# Patient Record
Sex: FEMALE | Race: WHITE | Employment: FULL TIME | URBAN - METROPOLITAN AREA
[De-identification: names, ages, dates, MRNs, and addresses within clinical notes are randomized per-mention and may not be internally consistent; named-entity substitution may affect disease eponyms.]

---

## 2023-07-13 ENCOUNTER — OFFICE VISIT (OUTPATIENT)
Dept: FAMILY MEDICINE CLINIC | Age: 38
End: 2023-07-13
Payer: COMMERCIAL

## 2023-07-13 ENCOUNTER — HOSPITAL ENCOUNTER (OUTPATIENT)
Age: 38
Discharge: HOME | End: 2023-07-13
Payer: COMMERCIAL

## 2023-07-13 VITALS
DIASTOLIC BLOOD PRESSURE: 70 MMHG | OXYGEN SATURATION: 98 % | BODY MASS INDEX: 27.23 KG/M2 | TEMPERATURE: 98 F | HEART RATE: 74 BPM | WEIGHT: 148 LBS | HEIGHT: 62 IN | SYSTOLIC BLOOD PRESSURE: 120 MMHG

## 2023-07-13 DIAGNOSIS — Z13.1 ENCOUNTER FOR SCREENING EXAMINATION FOR IMPAIRED GLUCOSE REGULATION AND DIABETES MELLITUS: ICD-10-CM

## 2023-07-13 DIAGNOSIS — F19.91 HISTORY OF DRUG USE: ICD-10-CM

## 2023-07-13 DIAGNOSIS — F17.210 CIGARETTE NICOTINE DEPENDENCE WITHOUT COMPLICATION: ICD-10-CM

## 2023-07-13 DIAGNOSIS — B96.89 BACTERIAL VAGINOSIS: ICD-10-CM

## 2023-07-13 DIAGNOSIS — I73.00 RAYNAUD'S DISEASE WITHOUT GANGRENE: Primary | ICD-10-CM

## 2023-07-13 DIAGNOSIS — Z13.0 SCREENING FOR DEFICIENCY ANEMIA: ICD-10-CM

## 2023-07-13 DIAGNOSIS — I73.00 RAYNAUD'S DISEASE WITHOUT GANGRENE: ICD-10-CM

## 2023-07-13 DIAGNOSIS — B18.2 CHRONIC HEPATITIS C WITHOUT HEPATIC COMA (HCC): ICD-10-CM

## 2023-07-13 DIAGNOSIS — N76.0 BACTERIAL VAGINOSIS: ICD-10-CM

## 2023-07-13 LAB
ALBUMIN SERPL-MCNC: 4 G/DL (ref 3.5–5.2)
ALP SERPL-CCNC: 72 U/L (ref 35–104)
ALT SERPL-CCNC: 54 U/L (ref 5–33)
ANION GAP SERPL CALCULATED.3IONS-SCNC: 11 MMOL/L (ref 9–17)
AST SERPL-CCNC: 47 U/L
BASOPHILS # BLD: 0 K/UL (ref 0–0.2)
BASOPHILS NFR BLD: 0 % (ref 0–2)
BILIRUB SERPL-MCNC: 0.5 MG/DL (ref 0.3–1.2)
BUN SERPL-MCNC: 7 MG/DL (ref 6–20)
BUN/CREAT SERPL: 12 (ref 9–20)
CALCIUM SERPL-MCNC: 9.4 MG/DL (ref 8.6–10.4)
CHLORIDE SERPL-SCNC: 101 MMOL/L (ref 98–107)
CO2 SERPL-SCNC: 24 MMOL/L (ref 20–31)
CREAT SERPL-MCNC: 0.6 MG/DL (ref 0.5–0.9)
DIFFERENTIAL TYPE: YES
EOSINOPHIL # BLD: 0.1 K/UL (ref 0–0.4)
EOSINOPHILS RELATIVE PERCENT: 2 % (ref 0–5)
ERYTHROCYTE [DISTWIDTH] IN BLOOD BY AUTOMATED COUNT: 13.1 % (ref 12.1–15.2)
GFR SERPL CREATININE-BSD FRML MDRD: >60 ML/MIN/1.73M2
GLUCOSE SERPL-MCNC: 88 MG/DL (ref 70–99)
HCT VFR BLD AUTO: 49.2 % (ref 36–46)
HGB BLD-MCNC: 16.6 G/DL (ref 12–16)
LYMPHOCYTES # BLD: 27 % (ref 15–40)
LYMPHOCYTES NFR BLD: 2.2 K/UL (ref 1–4.8)
MCH RBC QN AUTO: 35.3 PG (ref 26–34)
MCHC RBC AUTO-ENTMCNC: 33.7 G/DL (ref 31–37)
MCV RBC AUTO: 104.6 FL (ref 80–100)
MONOCYTES NFR BLD: 0.5 K/UL (ref 0–1)
MONOCYTES NFR BLD: 6 % (ref 4–8)
NEUTROPHILS NFR BLD: 65 % (ref 47–75)
NEUTS SEG NFR BLD: 5.4 K/UL (ref 2.5–7)
PLATELET # BLD AUTO: 232 K/UL (ref 140–450)
POTASSIUM SERPL-SCNC: 3.7 MMOL/L (ref 3.7–5.3)
PROT SERPL-MCNC: 7.4 G/DL (ref 6.4–8.3)
RBC # BLD AUTO: 4.71 M/UL (ref 4–5.2)
SODIUM SERPL-SCNC: 136 MMOL/L (ref 135–144)
TSH SERPL DL<=0.05 MIU/L-ACNC: 0.81 UIU/ML (ref 0.3–5)
WBC OTHER # BLD: 8.2 K/UL (ref 3.5–11)

## 2023-07-13 PROCEDURE — 36415 COLL VENOUS BLD VENIPUNCTURE: CPT

## 2023-07-13 PROCEDURE — 84443 ASSAY THYROID STIM HORMONE: CPT

## 2023-07-13 PROCEDURE — 85027 COMPLETE CBC AUTOMATED: CPT

## 2023-07-13 PROCEDURE — 87902 NFCT AGT GNTYP ALYS HEP C: CPT

## 2023-07-13 PROCEDURE — 99203 OFFICE O/P NEW LOW 30 MIN: CPT | Performed by: NURSE PRACTITIONER

## 2023-07-13 PROCEDURE — 80053 COMPREHEN METABOLIC PANEL: CPT

## 2023-07-13 RX ORDER — VARENICLINE TARTRATE
KIT
Qty: 53 EACH | Refills: 0 | Status: SHIPPED | OUTPATIENT
Start: 2023-07-13

## 2023-07-13 RX ORDER — METRONIDAZOLE 500 MG/1
500 TABLET ORAL EVERY 12 HOURS
COMMUNITY
Start: 2023-07-11

## 2023-07-13 SDOH — ECONOMIC STABILITY: FOOD INSECURITY: WITHIN THE PAST 12 MONTHS, THE FOOD YOU BOUGHT JUST DIDN'T LAST AND YOU DIDN'T HAVE MONEY TO GET MORE.: NEVER TRUE

## 2023-07-13 SDOH — ECONOMIC STABILITY: FOOD INSECURITY: WITHIN THE PAST 12 MONTHS, YOU WORRIED THAT YOUR FOOD WOULD RUN OUT BEFORE YOU GOT MONEY TO BUY MORE.: NEVER TRUE

## 2023-07-13 SDOH — ECONOMIC STABILITY: INCOME INSECURITY: HOW HARD IS IT FOR YOU TO PAY FOR THE VERY BASICS LIKE FOOD, HOUSING, MEDICAL CARE, AND HEATING?: NOT HARD AT ALL

## 2023-07-13 SDOH — ECONOMIC STABILITY: HOUSING INSECURITY
IN THE LAST 12 MONTHS, WAS THERE A TIME WHEN YOU DID NOT HAVE A STEADY PLACE TO SLEEP OR SLEPT IN A SHELTER (INCLUDING NOW)?: NO

## 2023-07-13 ASSESSMENT — ENCOUNTER SYMPTOMS
DIARRHEA: 0
ABDOMINAL PAIN: 0
SORE THROAT: 0
CONSTIPATION: 0
BLOOD IN STOOL: 0
BACK PAIN: 0
VOMITING: 0
COUGH: 0
SHORTNESS OF BREATH: 0
NAUSEA: 0

## 2023-07-13 ASSESSMENT — PATIENT HEALTH QUESTIONNAIRE - PHQ9
SUM OF ALL RESPONSES TO PHQ QUESTIONS 1-9: 0
1. LITTLE INTEREST OR PLEASURE IN DOING THINGS: 0
2. FEELING DOWN, DEPRESSED OR HOPELESS: 0
SUM OF ALL RESPONSES TO PHQ QUESTIONS 1-9: 0
SUM OF ALL RESPONSES TO PHQ QUESTIONS 1-9: 0
SUM OF ALL RESPONSES TO PHQ9 QUESTIONS 1 & 2: 0
SUM OF ALL RESPONSES TO PHQ QUESTIONS 1-9: 0

## 2023-07-13 NOTE — PATIENT INSTRUCTIONS
SURVEY:    You may be receiving a survey from [employer] regarding your visit today. Please complete the survey to enable us to provide the highest quality of care to you and your family. If you cannot score us a very good (5 Stars) on any question, please call the office to discuss how we could have made your experience a very good one. Thank you.     Clinical Care Team: CHUCK Morales-SELVIN Vigil LPN    Clerical Team: 1 [address] N[address]o

## 2023-07-13 NOTE — PROGRESS NOTES
HPI Notes    Name: Amos Hinson  : 1985         Chief Complaint:     Chief Complaint   Patient presents with    Hepatitis C     Patient dx with hepatitis C in 2014. She is past drug addict , she has not used since . Establish Care       History of Present Illness:        HPI  Pt is a 43 yo female who presents as a new pt. Pt smokes cigarettes and vapes. Smoking about 1/2ppd. Wants to quit and would like to try Chantix. Started cigarettes at age 13. Added marijuana at 12. Age 21 started doing \"pills, whatever I could get\". Changed to heroin at 21, snorting. First did IV heroin around age 30. Got sober in  after being incarcerated. Has successfully stayed clean for the past 10 years. Dx with Hep C in . Has never done any treatment. Tore both wrists at [employer]. Wore wrist braces and did PT. Has Raynaud's disease. Does not treat them. Works at [employer]. Going through a divorce. Has 4 children. Oldest is 22. She also struggled with addiction. Past Medical History:     Past Medical History:   Diagnosis Date    Anxiety     Drug addict ([facility])     Hepatitis C       Reviewed all health maintenance requirements and ordered appropriate tests  Health Maintenance Due   Topic Date Due    Varicella vaccine (1 of 2 - 2-dose childhood series) Never done    Pneumococcal 0-64 years Vaccine (1 - PCV) Never done    HIV screen  Never done    Hepatitis C screen  Never done    DTaP/Tdap/Td vaccine (1 - Tdap) Never done    Cervical cancer screen  Never done    Diabetes screen  Never done    COVID-19 Vaccine (4 - Booster for [place] series) 2022       Past Surgical History:     Past Surgical History:   Procedure Laterality Date    TUBAL LIGATION          Medications:       Prior to Admission medications    Medication Sig Start Date End Date Taking? Authorizing Provider   metroNIDAZOLE (FLAGYL) 500 MG tablet Take 1 tablet by mouth in the morning and 1 tablet in the evening.  for 7

## 2023-07-17 LAB — HCV GENTYP SERPL NAA+PROBE: NORMAL

## 2023-08-03 ENCOUNTER — OFFICE VISIT (OUTPATIENT)
Dept: GASTROENTEROLOGY | Age: 38
End: 2023-08-03
Payer: COMMERCIAL

## 2023-08-03 VITALS
WEIGHT: 149 LBS | HEART RATE: 73 BPM | OXYGEN SATURATION: 99 % | RESPIRATION RATE: 19 BRPM | BODY MASS INDEX: 27.25 KG/M2

## 2023-08-03 DIAGNOSIS — R76.8 HEPATITIS C ANTIBODY POSITIVE IN BLOOD: ICD-10-CM

## 2023-08-03 DIAGNOSIS — R74.8 ELEVATED LIVER ENZYMES: Primary | ICD-10-CM

## 2023-08-03 PROCEDURE — 99202 OFFICE O/P NEW SF 15 MIN: CPT | Performed by: INTERNAL MEDICINE

## 2023-08-03 NOTE — PATIENT INSTRUCTIONS
SURVEY:    You may be receiving a survey from [facility] regarding your visit today. Please complete the survey to enable us to provide the highest quality of care to you and your family. If you cannot score us a very good on any question, please call the office to discuss how we could have made your experience a very good one. Thank you.   Tomas MENDEZ/MD Herlinda Simpson MD Lurline Hale, MD Harold Cove, DO [facility], APRN-CN  Shaka Vanegas, APRN-CNP  [address], LINDA Romano, [address] [facility], [address] [place] [address], [address] [place] [address], [address]

## 2023-08-12 ENCOUNTER — HOSPITAL ENCOUNTER (OUTPATIENT)
Dept: ULTRASOUND IMAGING | Age: 38
Discharge: HOME | End: 2023-08-12
Payer: COMMERCIAL

## 2023-08-12 DIAGNOSIS — R74.8 ELEVATED LIVER ENZYMES: ICD-10-CM

## 2023-08-12 DIAGNOSIS — R76.8 HEPATITIS C ANTIBODY POSITIVE IN BLOOD: ICD-10-CM

## 2023-08-12 PROCEDURE — 76705 ECHO EXAM OF ABDOMEN: CPT

## 2023-08-12 PROCEDURE — 76981 USE PARENCHYMA: CPT

## 2023-08-17 ENCOUNTER — TELEPHONE (OUTPATIENT)
Dept: FAMILY MEDICINE CLINIC | Age: 38
End: 2023-08-17

## 2023-08-17 NOTE — TELEPHONE ENCOUNTER
Pt called wanting an appointment, no appointments to fit pt's needs.  Pt stated she has finished her ABX for a VD and still is having symptoms, pt stated she will go to a walk in she didn't want to wait

## 2023-08-21 ENCOUNTER — TELEPHONE (OUTPATIENT)
Dept: GASTROENTEROLOGY | Age: 38
End: 2023-08-21

## 2023-08-21 NOTE — TELEPHONE ENCOUNTER
----- Message from Juanito Hyde MD sent at 8/19/2023 10:12 PM EDT -----  Please notify patient: There is no evidence of cirrhosis per ultrasound report.

## 2023-08-31 ENCOUNTER — HOSPITAL ENCOUNTER (OUTPATIENT)
Age: 38
Discharge: HOME | End: 2023-08-31
Payer: COMMERCIAL

## 2023-08-31 ENCOUNTER — OFFICE VISIT (OUTPATIENT)
Dept: FAMILY MEDICINE CLINIC | Age: 38
End: 2023-08-31
Payer: COMMERCIAL

## 2023-08-31 VITALS
DIASTOLIC BLOOD PRESSURE: 80 MMHG | HEART RATE: 75 BPM | BODY MASS INDEX: 26.7 KG/M2 | SYSTOLIC BLOOD PRESSURE: 120 MMHG | WEIGHT: 146 LBS | OXYGEN SATURATION: 99 %

## 2023-08-31 DIAGNOSIS — R35.0 URINARY FREQUENCY: Primary | ICD-10-CM

## 2023-08-31 DIAGNOSIS — R76.8 HEPATITIS C ANTIBODY POSITIVE IN BLOOD: ICD-10-CM

## 2023-08-31 DIAGNOSIS — N30.00 ACUTE CYSTITIS WITHOUT HEMATURIA: ICD-10-CM

## 2023-08-31 DIAGNOSIS — R30.0 DYSURIA: ICD-10-CM

## 2023-08-31 DIAGNOSIS — F17.210 CIGARETTE NICOTINE DEPENDENCE WITHOUT COMPLICATION: ICD-10-CM

## 2023-08-31 DIAGNOSIS — R35.0 URINARY FREQUENCY: ICD-10-CM

## 2023-08-31 DIAGNOSIS — T88.7XXA SIDE EFFECT OF DRUG: ICD-10-CM

## 2023-08-31 DIAGNOSIS — R74.8 ELEVATED LIVER ENZYMES: ICD-10-CM

## 2023-08-31 LAB
A1AT SERPL-MCNC: 172 MG/DL (ref 90–200)
ALBUMIN SERPL-MCNC: 4 G/DL (ref 3.5–5.2)
ALP SERPL-CCNC: 81 U/L (ref 35–104)
ALT SERPL-CCNC: 45 U/L (ref 5–33)
ANION GAP SERPL CALCULATED.3IONS-SCNC: 9 MMOL/L (ref 9–17)
AST SERPL-CCNC: 52 U/L
BASOPHILS # BLD: ABNORMAL K/UL (ref 0–0.2)
BASOPHILS NFR BLD: ABNORMAL % (ref 0–2)
BILIRUB SERPL-MCNC: 0.4 MG/DL (ref 0.3–1.2)
BILIRUBIN, POC: NEGATIVE
BLOOD URINE, POC: NEGATIVE
BUN SERPL-MCNC: 4 MG/DL (ref 6–20)
BUN/CREAT SERPL: 7 (ref 9–20)
CALCIUM SERPL-MCNC: 9.4 MG/DL (ref 8.6–10.4)
CERULOPLASMIN SERPL-MCNC: 29 MG/DL (ref 16–45)
CHLORIDE SERPL-SCNC: 103 MMOL/L (ref 98–107)
CLARITY, POC: CLEAR
CO2 SERPL-SCNC: 28 MMOL/L (ref 20–31)
COLOR, POC: YELLOW
CREAT SERPL-MCNC: 0.6 MG/DL (ref 0.5–0.9)
EOSINOPHIL # BLD: 0.2 K/UL (ref 0–0.4)
EOSINOPHILS RELATIVE PERCENT: 3 % (ref 0–5)
ERYTHROCYTE [DISTWIDTH] IN BLOOD BY AUTOMATED COUNT: 13 % (ref 12.1–15.2)
GFR SERPL CREATININE-BSD FRML MDRD: >60 ML/MIN/1.73M2
GLUCOSE SERPL-MCNC: 100 MG/DL (ref 70–99)
GLUCOSE URINE, POC: NEGATIVE
HAV AB SERPL IA-ACNC: NONREACTIVE
HAV IGM SERPL QL IA: NONREACTIVE
HBV CORE AB SER QL: NONREACTIVE
HBV CORE IGM SERPL QL IA: NONREACTIVE
HBV SURFACE AB SERPL IA-ACNC: <3.5 MIU/ML
HBV SURFACE AG SERPL QL IA: NONREACTIVE
HCT VFR BLD AUTO: 49.2 % (ref 36–46)
HGB BLD-MCNC: 16.6 G/DL (ref 12–16)
IMM GRANULOCYTES # BLD AUTO: ABNORMAL K/UL (ref 0–0.3)
IMM GRANULOCYTES NFR BLD: ABNORMAL %
KETONES, POC: NORMAL
LEUKOCYTE EST, POC: NORMAL
LYMPHOCYTES NFR BLD: 1.84 K/UL (ref 1–4.8)
LYMPHOCYTES RELATIVE PERCENT: 27 % (ref 15–40)
MCH RBC QN AUTO: 35.1 PG (ref 26–34)
MCHC RBC AUTO-ENTMCNC: 33.8 G/DL (ref 31–37)
MCV RBC AUTO: 103.9 FL (ref 80–100)
MONOCYTES NFR BLD: 0.34 K/UL (ref 0–1)
MONOCYTES NFR BLD: 5 % (ref 4–8)
MORPHOLOGY: ABNORMAL
NEUTROPHILS NFR BLD: 65 % (ref 47–75)
NEUTS SEG NFR BLD: 4.42 K/UL (ref 2.5–7)
NITRITE, POC: POSITIVE
PH, POC: 6
PLATELET # BLD AUTO: 263 K/UL (ref 140–450)
POTASSIUM SERPL-SCNC: 3.7 MMOL/L (ref 3.7–5.3)
PROT SERPL-MCNC: 7.9 G/DL (ref 6.4–8.3)
PROTEIN, POC: NEGATIVE
RBC # BLD AUTO: 4.73 M/UL (ref 4–5.2)
SODIUM SERPL-SCNC: 140 MMOL/L (ref 135–144)
SPECIFIC GRAVITY, POC: 1.02
UROBILINOGEN, POC: 1
WBC OTHER # BLD: 6.8 K/UL (ref 3.5–11)

## 2023-08-31 PROCEDURE — 83516 IMMUNOASSAY NONANTIBODY: CPT

## 2023-08-31 PROCEDURE — 81003 URINALYSIS AUTO W/O SCOPE: CPT | Performed by: STUDENT IN AN ORGANIZED HEALTH CARE EDUCATION/TRAINING PROGRAM

## 2023-08-31 PROCEDURE — 86317 IMMUNOASSAY INFECTIOUS AGENT: CPT

## 2023-08-31 PROCEDURE — 81256 HFE GENE: CPT

## 2023-08-31 PROCEDURE — 80053 COMPREHEN METABOLIC PANEL: CPT

## 2023-08-31 PROCEDURE — 87086 URINE CULTURE/COLONY COUNT: CPT

## 2023-08-31 PROCEDURE — 85025 COMPLETE CBC W/AUTO DIFF WBC: CPT

## 2023-08-31 PROCEDURE — 99214 OFFICE O/P EST MOD 30 MIN: CPT | Performed by: STUDENT IN AN ORGANIZED HEALTH CARE EDUCATION/TRAINING PROGRAM

## 2023-08-31 PROCEDURE — 36415 COLL VENOUS BLD VENIPUNCTURE: CPT

## 2023-08-31 PROCEDURE — 87340 HEPATITIS B SURFACE AG IA: CPT

## 2023-08-31 PROCEDURE — 86708 HEPATITIS A ANTIBODY: CPT

## 2023-08-31 PROCEDURE — 86705 HEP B CORE ANTIBODY IGM: CPT

## 2023-08-31 PROCEDURE — 86709 HEPATITIS A IGM ANTIBODY: CPT

## 2023-08-31 PROCEDURE — 82103 ALPHA-1-ANTITRYPSIN TOTAL: CPT

## 2023-08-31 PROCEDURE — 82390 ASSAY OF CERULOPLASMIN: CPT

## 2023-08-31 PROCEDURE — 82784 ASSAY IGA/IGD/IGG/IGM EACH: CPT

## 2023-08-31 PROCEDURE — 87522 HEPATITIS C REVRS TRNSCRPJ: CPT

## 2023-08-31 PROCEDURE — 86704 HEP B CORE ANTIBODY TOTAL: CPT

## 2023-08-31 RX ORDER — CIPROFLOXACIN 500 MG/1
500 TABLET, FILM COATED ORAL 2 TIMES DAILY
Qty: 14 TABLET | Refills: 0 | Status: SHIPPED | OUTPATIENT
Start: 2023-08-31 | End: 2023-09-07

## 2023-08-31 RX ORDER — BUPROPION HYDROCHLORIDE 150 MG/1
150 TABLET ORAL EVERY MORNING
Qty: 30 TABLET | Refills: 3 | Status: SHIPPED | OUTPATIENT
Start: 2023-08-31

## 2023-08-31 ASSESSMENT — ENCOUNTER SYMPTOMS
ABDOMINAL PAIN: 1
DIARRHEA: 0
COUGH: 0
WHEEZING: 0
VOMITING: 0
BACK PAIN: 0
SINUS PAIN: 0
NAUSEA: 0
RHINORRHEA: 0

## 2023-08-31 NOTE — PROGRESS NOTES
HPI Notes    Name: Laura Parker  : 1985         Chief Complaint:     Chief Complaint   Patient presents with    Vaginitis     Follow up. Patient was treated twice in the past 2 months for BV. She is currently experiencing burning and itching. No discharge. The past 2 days she has complaint of urinary urgency, lower abdominal discomfort and low back pain. History of Present Illness:      HPI    Is a 70-year-old woman presenting for complaint of dysuria, vaginal itching, low back pain, urinary urgency, frequency, suprapubic discomfort. This is been going on for several weeks and she has been treated 2 times for bacterial vaginosis at outside facilities. She denies vaginal discharge, dyspareunia, vaginal bleeding. 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 She would like to consider Wellbutrin instead. Past Medical History:     Past Medical History:   Diagnosis Date    Anxiety     Drug addict ([address])     Hepatitis C     Liver disease       Reviewed all health maintenance requirements and ordered appropriate tests  Health Maintenance Due   Topic Date Due    Hepatitis A vaccine (1 of 2 - Risk 2-dose series) Never done    Varicella vaccine (1 of 2 - 2-dose childhood series) Never done    Pneumococcal 0-64 years Vaccine (1 - PCV) Never done    HIV screen  Never done    Hepatitis B vaccine (1 of 3 - Risk 3-dose series) Never done    DTaP/Tdap/Td vaccine (1 - Tdap) Never done    Cervical cancer screen  Never done    COVID-19 Vaccine (4 - Booster for [place] series) 2022    Flu vaccine (1) 2023       Past Surgical History:     Past Surgical History:   Procedure Laterality Date    TUBAL LIGATION          Medications:       Prior to Admission medications    Medication Sig Start Date End Date Taking?  Authorizing Provider   ciprofloxacin (CIPRO) 500 MG tablet Take 1

## 2023-08-31 NOTE — PATIENT INSTRUCTIONS
SURVEY:    You may be receiving a survey from [employer] regarding your visit today. You may get this in the mail, through your MyChart or in your email. Please complete the survey to enable us to provide the highest quality of care to you and your family. If you cannot score us as very good ( 5 Stars) on any question, please feel free to call the office to discuss how we could have made your experience exceptional.     Thank you.     Clinical Care Team:  Dr. Paul lOvera, DO Ana Ledezma LPN    Triage:  [facility], [facility] Team:  Antony Resendiz

## 2023-09-01 LAB
MICROORGANISM SPEC CULT: NO GROWTH
SPECIMEN DESCRIPTION: NORMAL

## 2023-09-02 LAB — SMOOTH MUSCLE ANTIBODY: 9 UNITS (ref 0–19)

## 2023-09-03 DIAGNOSIS — R76.8 HEPATITIS C ANTIBODY POSITIVE IN BLOOD: Primary | ICD-10-CM

## 2023-09-03 LAB
GLIADIN IGA SER IA-ACNC: 2 U/ML
GLIADIN IGG SER IA-ACNC: <0.4 U/ML
HCV RNA # SERPL NAA+PROBE: DETECTED {COPIES}/ML
HCV RNA SERPL NAA+PROBE-ACNC: ABNORMAL IU/ML
HCV RNA SERPL NAA+PROBE-LOG IU: 6.79 LOG IU/ML
IGA SERPL-MCNC: 376 MG/DL (ref 70–400)
MITOCHONDRIA M2 IGG SER-ACNC: <0.5 U/ML (ref 0–4)
SPECIMEN SOURCE: ABNORMAL
TTG IGA SER IA-ACNC: 0.6 U/ML

## 2023-09-06 LAB
C282Y HEMOCHROMATOSIS MUT: NEGATIVE
H63D HEMOCHROMATOSIS MUT: NEGATIVE
HEMOCHROMATOSIS MUTATION INT: NORMAL
HEMOCHROMATOSIS SPECIMEN: NORMAL
S65C HEMOCHROMATOSIS MUT: NEGATIVE

## 2023-09-07 ENCOUNTER — TELEPHONE (OUTPATIENT)
Dept: FAMILY MEDICINE CLINIC | Age: 38
End: 2023-09-07

## 2023-09-07 DIAGNOSIS — N30.00 ACUTE CYSTITIS WITHOUT HEMATURIA: Primary | ICD-10-CM

## 2023-09-07 RX ORDER — NITROFURANTOIN 25; 75 MG/1; MG/1
100 CAPSULE ORAL 2 TIMES DAILY
Qty: 20 CAPSULE | Refills: 0 | Status: SHIPPED | OUTPATIENT
Start: 2023-09-07 | End: 2023-09-17

## 2023-09-07 NOTE — TELEPHONE ENCOUNTER
----- Message from Pia Burch LPN sent at 3/6/8217  3:12 PM EDT -----  Philip Foremanlander patient.  Thanks   ----- Message -----  From: Ismael Jones MD  Sent: 9/6/2023   9:53 AM EDT  To: Eve Silveira Clinical Staff    Please notify patients of normal labs

## 2023-09-07 NOTE — TELEPHONE ENCOUNTER
Patient calling states that she was still waiting on a call about what she should be doing because of her symptoms - spoke to the nurse last week when she called with culture results

## 2023-09-21 ENCOUNTER — OFFICE VISIT (OUTPATIENT)
Dept: GASTROENTEROLOGY | Age: 38
End: 2023-09-21
Payer: COMMERCIAL

## 2023-09-21 VITALS
HEART RATE: 72 BPM | BODY MASS INDEX: 26.68 KG/M2 | HEIGHT: 62 IN | WEIGHT: 145 LBS | RESPIRATION RATE: 16 BRPM | SYSTOLIC BLOOD PRESSURE: 120 MMHG | OXYGEN SATURATION: 98 % | DIASTOLIC BLOOD PRESSURE: 82 MMHG

## 2023-09-21 DIAGNOSIS — R76.8 HEPATITIS C ANTIBODY POSITIVE IN BLOOD: Primary | ICD-10-CM

## 2023-09-21 PROCEDURE — 99213 OFFICE O/P EST LOW 20 MIN: CPT | Performed by: INTERNAL MEDICINE

## 2023-09-21 NOTE — PROGRESS NOTES
been treated since her diagnosis of hepatitis C. Her hepatitis C genotype is 1a, her viral load is 6.1 million. Plan:  Hepatitis C antibody positive in blood  -     External Referral To Infectious Disease; Dr. Rafa Israel.       - She prefers to stay local and within minutes rather than travel to [facility]. - US elastography shows no evidence of cirrhosis at this time     2. F/U in 3 months or sooner as needed    Spent 20 minutes with the patient with greater than 50 percent of the time was spent on face-to-face time in discussion with the patient regarding diagnostic options/results, treatment options, counseling, and follow-up plan.   Electronically signed by Lon Solorzano MD on 9/21/23 at 4:27 PM EDT

## 2023-10-26 ENCOUNTER — TELEPHONE (OUTPATIENT)
Dept: GASTROENTEROLOGY | Age: 38
End: 2023-10-26

## 2023-10-26 NOTE — TELEPHONE ENCOUNTER
Patient was referred to Infectious Disease, Dr Tu Be. I phoned the patient to remind her of this. The patient states that she will not be going to this referral, states that she has too much going on. She has let their office know as well.

## 2024-05-14 ENCOUNTER — OFFICE VISIT (OUTPATIENT)
Dept: FAMILY MEDICINE CLINIC | Age: 39
End: 2024-05-14
Payer: COMMERCIAL

## 2024-05-14 VITALS
HEIGHT: 62 IN | HEART RATE: 72 BPM | DIASTOLIC BLOOD PRESSURE: 60 MMHG | WEIGHT: 147 LBS | BODY MASS INDEX: 27.05 KG/M2 | SYSTOLIC BLOOD PRESSURE: 120 MMHG | OXYGEN SATURATION: 100 %

## 2024-05-14 DIAGNOSIS — S39.012A LOW BACK STRAIN, INITIAL ENCOUNTER: Primary | ICD-10-CM

## 2024-05-14 PROCEDURE — 99213 OFFICE O/P EST LOW 20 MIN: CPT | Performed by: NURSE PRACTITIONER

## 2024-05-14 RX ORDER — TIZANIDINE 4 MG/1
4 TABLET ORAL NIGHTLY PRN
Qty: 30 TABLET | Refills: 0 | Status: SHIPPED | OUTPATIENT
Start: 2024-05-14

## 2024-05-14 ASSESSMENT — ENCOUNTER SYMPTOMS
NAUSEA: 0
VOMITING: 0
BOWEL INCONTINENCE: 0
DIARRHEA: 0
SHORTNESS OF BREATH: 0

## 2024-05-14 ASSESSMENT — PATIENT HEALTH QUESTIONNAIRE - PHQ9
SUM OF ALL RESPONSES TO PHQ QUESTIONS 1-9: 0
SUM OF ALL RESPONSES TO PHQ9 QUESTIONS 1 & 2: 0
SUM OF ALL RESPONSES TO PHQ QUESTIONS 1-9: 0
2. FEELING DOWN, DEPRESSED OR HOPELESS: NOT AT ALL
1. LITTLE INTEREST OR PLEASURE IN DOING THINGS: NOT AT ALL

## 2024-05-14 NOTE — PATIENT INSTRUCTIONS
Patient Education        Low Back Pain: Exercises  Introduction  Here are some examples of exercises for you to try. The exercises may be suggested for a condition or for rehabilitation. Start each exercise slowly. Ease off the exercises if you start to have pain.  You will be told when to start these exercises and which ones will work best for you.  How to do the exercises  Hamstring stretch in a doorway    Sit on the floor close to a doorway. Be sure to stretch your affected leg first.  Lie down with your other leg through the doorway.  Slide your affected leg up the wall to straighten your knee. Don't point your toes. You should feel a gentle stretch down the back of your leg. Be sure to:  Hold the stretch for at least 1 minute. Then over time, try to lengthen the time you hold the stretch to as long as 6 minutes.  Repeat 2 to 4 times.  It's a good idea to repeat these steps with your other leg.  To stretch your right leg, scoot to the right side of the doorway.  To stretch your left leg, scoot to the left side of the doorway.  Keep both knees straight.  Keep your back flat and your other heel on the floor.  If you do not have a place to do this exercise in a doorway, there is another way to do it:  Lie on your back, and bend the knee of your affected leg.  Loop a towel under the ball and toes of that foot, and hold the ends of the towel in your hands.  Straighten your knee as you raise that foot into the air. Slowly pull back on the towel. You should feel a gentle stretch down the back of your leg.  Hold the stretch for 15 to 30 seconds. Or even better, hold the stretch for 1 minute if you can.  Repeat 2 to 4 times.  It's a good idea to repeat these steps with your other leg.  Single knee-to-chest stretch    Lie on your back with your knees bent and your feet flat on the floor. You can put a small pillow under your head and neck if it is more comfortable.  Clasp your hands under one knee and bring the knee to

## 2024-05-14 NOTE — PROGRESS NOTES
HPI Notes    Name: Nola Llanes  : 1985         Chief Complaint:     Chief Complaint   Patient presents with    Back Pain     Intermittent back pain ongoing for about a year. Pt states it normally is just a dull ache, states yesterday she went to cross her legs and a pain shot up her back and pain has been intense since. Intermittent spasm's.        History of Present Illness:        Back Pain  This is a new problem. The current episode started yesterday. The problem occurs intermittently. The problem has been gradually worsening since onset. The pain is present in the lumbar spine. The quality of the pain is described as aching and shooting. The pain is moderate. The symptoms are aggravated by sitting and lying down. Stiffness is present In the morning. Pertinent negatives include no bladder incontinence, bowel incontinence, chest pain, fever, headaches, paresthesias or perianal numbness. Risk factors include lack of exercise. She has tried NSAIDs (TENS) for the symptoms. The treatment provided mild relief.       Past Medical History:     Past Medical History:   Diagnosis Date    Anxiety     Drug addict (HCC)     Hepatitis C     Liver disease       Reviewed all health maintenance requirements and ordered appropriate tests  Health Maintenance Due   Topic Date Due    Hepatitis B vaccine (1 of 3 - 3-dose series) Never done    Varicella vaccine (1 of 2 - 2-dose childhood series) Never done    Pneumococcal 0-64 years Vaccine (1 of 2 - PCV) Never done    HIV screen  Never done    Hepatitis A vaccine (1 of 2 - Risk 2-dose series) Never done    DTaP/Tdap/Td vaccine (1 - Tdap) Never done    Cervical cancer screen  Never done    COVID-19 Vaccine (2023- season) 2023    Lipids  Never done       Past Surgical History:     Past Surgical History:   Procedure Laterality Date    TUBAL LIGATION          Medications:       Prior to Admission medications    Medication Sig Start Date End Date Taking?

## 2024-08-09 ENCOUNTER — HOSPITAL ENCOUNTER (OUTPATIENT)
Age: 39
Setting detail: SPECIMEN
Discharge: HOME | End: 2024-08-09
Payer: COMMERCIAL

## 2024-08-09 ENCOUNTER — OFFICE VISIT (OUTPATIENT)
Dept: FAMILY MEDICINE CLINIC | Age: 39
End: 2024-08-09
Payer: COMMERCIAL

## 2024-08-09 VITALS — OXYGEN SATURATION: 98 % | SYSTOLIC BLOOD PRESSURE: 120 MMHG | HEART RATE: 76 BPM | DIASTOLIC BLOOD PRESSURE: 78 MMHG

## 2024-08-09 DIAGNOSIS — Z12.31 ENCOUNTER FOR SCREENING MAMMOGRAM FOR MALIGNANT NEOPLASM OF BREAST: ICD-10-CM

## 2024-08-09 DIAGNOSIS — N30.00 ACUTE CYSTITIS WITHOUT HEMATURIA: ICD-10-CM

## 2024-08-09 DIAGNOSIS — N92.6 IRREGULAR MENSES: ICD-10-CM

## 2024-08-09 DIAGNOSIS — N30.00 ACUTE CYSTITIS WITHOUT HEMATURIA: Primary | ICD-10-CM

## 2024-08-09 DIAGNOSIS — S39.012D LOW BACK STRAIN, SUBSEQUENT ENCOUNTER: ICD-10-CM

## 2024-08-09 LAB
BILIRUBIN, POC: NORMAL
BLOOD URINE, POC: NORMAL
CANDIDA SPECIES: NEGATIVE
CLARITY, POC: CLEAR
COLOR, POC: YELLOW
GARDNERELLA VAGINALIS: POSITIVE
GLUCOSE URINE, POC: NORMAL
KETONES, POC: NORMAL
LEUKOCYTE EST, POC: NORMAL
NITRITE, POC: NORMAL
PH, POC: 5.5
PROTEIN, POC: NORMAL
SOURCE: ABNORMAL
SPECIFIC GRAVITY, POC: 1.01
TRICHOMONAS: POSITIVE
UROBILINOGEN, POC: 1

## 2024-08-09 PROCEDURE — 87086 URINE CULTURE/COLONY COUNT: CPT

## 2024-08-09 PROCEDURE — 87660 TRICHOMONAS VAGIN DIR PROBE: CPT

## 2024-08-09 PROCEDURE — 87510 GARDNER VAG DNA DIR PROBE: CPT

## 2024-08-09 PROCEDURE — 81002 URINALYSIS NONAUTO W/O SCOPE: CPT | Performed by: NURSE PRACTITIONER

## 2024-08-09 PROCEDURE — 99214 OFFICE O/P EST MOD 30 MIN: CPT | Performed by: NURSE PRACTITIONER

## 2024-08-09 PROCEDURE — 87480 CANDIDA DNA DIR PROBE: CPT

## 2024-08-09 RX ORDER — TIZANIDINE 4 MG/1
4 TABLET ORAL NIGHTLY PRN
Qty: 30 TABLET | Refills: 2 | Status: SHIPPED | OUTPATIENT
Start: 2024-08-09

## 2024-08-09 RX ORDER — CEPHALEXIN 500 MG/1
500 CAPSULE ORAL 3 TIMES DAILY
Qty: 15 CAPSULE | Refills: 0 | Status: SHIPPED | OUTPATIENT
Start: 2024-08-09 | End: 2024-08-14

## 2024-08-09 SDOH — ECONOMIC STABILITY: FOOD INSECURITY: WITHIN THE PAST 12 MONTHS, YOU WORRIED THAT YOUR FOOD WOULD RUN OUT BEFORE YOU GOT MONEY TO BUY MORE.: NEVER TRUE

## 2024-08-09 SDOH — ECONOMIC STABILITY: FOOD INSECURITY: WITHIN THE PAST 12 MONTHS, THE FOOD YOU BOUGHT JUST DIDN'T LAST AND YOU DIDN'T HAVE MONEY TO GET MORE.: NEVER TRUE

## 2024-08-09 SDOH — ECONOMIC STABILITY: INCOME INSECURITY: HOW HARD IS IT FOR YOU TO PAY FOR THE VERY BASICS LIKE FOOD, HOUSING, MEDICAL CARE, AND HEATING?: NOT HARD AT ALL

## 2024-08-09 ASSESSMENT — ENCOUNTER SYMPTOMS
SHORTNESS OF BREATH: 0
COUGH: 0
VOMITING: 0
ABDOMINAL PAIN: 1
NAUSEA: 0
DIARRHEA: 0
CRAMPS: 1
BACK PAIN: 1
BOWEL INCONTINENCE: 0

## 2024-08-09 NOTE — PROGRESS NOTES
never used smokeless tobacco.  Alcohol:      has no history on file for alcohol use.  Drug Use:  reports that she does not currently use drugs after having used the following drugs: Cocaine.    Family History:     Family History   Problem Relation Age of Onset    No Known Problems Mother     Alcohol Abuse Father     Diabetes Maternal Grandmother        Review of Systems:         Review of Systems   Constitutional:  Negative for chills and fever.   Respiratory:  Negative for cough and shortness of breath.    Cardiovascular:  Negative for chest pain and palpitations.   Gastrointestinal:  Positive for abdominal pain. Negative for bowel incontinence, diarrhea, nausea and vomiting.   Genitourinary:  Positive for vaginal bleeding. Negative for bladder incontinence and vaginal discharge.   Musculoskeletal:  Positive for back pain.   Neurological:  Negative for dizziness, seizures, headaches and paresthesias.         Physical Exam:     Vitals:  /78 (Site: Left Upper Arm, Position: Sitting)   Pulse 76   SpO2 98%       Physical Exam  Vitals and nursing note reviewed.   Constitutional:       Appearance: Normal appearance. She is well-developed.   Cardiovascular:      Rate and Rhythm: Normal rate and regular rhythm.      Heart sounds: Normal heart sounds, S1 normal and S2 normal.   Pulmonary:      Effort: Pulmonary effort is normal. No respiratory distress.      Breath sounds: Normal breath sounds.   Abdominal:      General: Bowel sounds are normal.      Palpations: Abdomen is soft.      Tenderness: There is abdominal tenderness in the suprapubic area.      Comments: NEG markle test   Musculoskeletal:      Lumbar back: Tenderness present. Decreased range of motion. Negative right straight leg raise test and negative left straight leg raise test.      Comments: tenderness to paraspinal muscles lumbar left > right  NO increased pain with forward bend  NO increased pain with backward bend  Mild increase in pain with side

## 2024-08-10 ENCOUNTER — TELEPHONE (OUTPATIENT)
Dept: FAMILY MEDICINE CLINIC | Age: 39
End: 2024-08-10

## 2024-08-10 LAB
MICROORGANISM SPEC CULT: NORMAL
SERVICE CMNT-IMP: NORMAL
SPECIMEN DESCRIPTION: NORMAL

## 2024-08-10 RX ORDER — METRONIDAZOLE 500 MG/1
500 TABLET ORAL 2 TIMES DAILY
Qty: 14 TABLET | Refills: 0 | Status: SHIPPED | OUTPATIENT
Start: 2024-08-10 | End: 2024-08-17

## 2024-08-10 NOTE — TELEPHONE ENCOUNTER
Pt called with results of Vnap. Pt educated about disease processes. Flagyl 500mg BID x 7 days sent to [facility]

## 2024-08-22 ENCOUNTER — TELEPHONE (OUTPATIENT)
Dept: FAMILY MEDICINE CLINIC | Age: 39
End: 2024-08-22

## 2024-08-22 DIAGNOSIS — A59.01 TRICHOMONAS VAGINALIS (TV) INFECTION: Primary | ICD-10-CM

## 2024-08-22 RX ORDER — METRONIDAZOLE 500 MG/1
500 TABLET ORAL 2 TIMES DAILY
Qty: 6 TABLET | Refills: 0 | Status: SHIPPED | OUTPATIENT
Start: 2024-08-22 | End: 2024-08-25

## 2024-08-22 NOTE — PROGRESS NOTES
NAAT for trich ordered, continue Flagyl for 3 days (10 total). Get test to cure performed next week any time.

## 2024-08-22 NOTE — TELEPHONE ENCOUNTER
Pt was seen on 08/09/24. Pt took an ABX  Flagyl for a STI and she is still havinf pain in the groin area. Does pt need another ABX? Please advise

## 2024-08-27 ENCOUNTER — TELEPHONE (OUTPATIENT)
Dept: FAMILY MEDICINE CLINIC | Age: 39
End: 2024-08-27

## 2024-08-27 NOTE — TELEPHONE ENCOUNTER
Pt was seen on 08/09/24  for hematuria. Pt tested positive for Trichomonas and Gardnerella. Pt has completed taking the ABX Flagyl. Pt called on 08/22/24 and medication was extended.   Yesterday pt stated having left groin pain and left side back pain. Please advise

## 2024-08-30 ENCOUNTER — HOSPITAL ENCOUNTER (OUTPATIENT)
Age: 39
Setting detail: SPECIMEN
Discharge: HOME | End: 2024-08-30
Payer: COMMERCIAL

## 2024-08-30 ENCOUNTER — OFFICE VISIT (OUTPATIENT)
Dept: FAMILY MEDICINE CLINIC | Age: 39
End: 2024-08-30
Payer: COMMERCIAL

## 2024-08-30 VITALS — SYSTOLIC BLOOD PRESSURE: 104 MMHG | DIASTOLIC BLOOD PRESSURE: 70 MMHG | OXYGEN SATURATION: 98 % | HEART RATE: 90 BPM

## 2024-08-30 DIAGNOSIS — B96.89 BACTERIAL VAGINOSIS: Primary | ICD-10-CM

## 2024-08-30 DIAGNOSIS — B96.89 BACTERIAL VAGINOSIS: ICD-10-CM

## 2024-08-30 DIAGNOSIS — N76.0 BACTERIAL VAGINOSIS: ICD-10-CM

## 2024-08-30 DIAGNOSIS — N76.0 BACTERIAL VAGINOSIS: Primary | ICD-10-CM

## 2024-08-30 PROCEDURE — 87480 CANDIDA DNA DIR PROBE: CPT

## 2024-08-30 PROCEDURE — 87660 TRICHOMONAS VAGIN DIR PROBE: CPT

## 2024-08-30 PROCEDURE — 99213 OFFICE O/P EST LOW 20 MIN: CPT | Performed by: NURSE PRACTITIONER

## 2024-08-30 PROCEDURE — 87510 GARDNER VAG DNA DIR PROBE: CPT

## 2024-08-30 ASSESSMENT — ENCOUNTER SYMPTOMS
COUGH: 0
VOMITING: 0
SHORTNESS OF BREATH: 0
DIARRHEA: 0
NAUSEA: 0

## 2024-08-30 NOTE — PROGRESS NOTES
[facility] Notes    Name: Nola Llanes  : 1985         Chief Complaint:     Chief Complaint   Patient presents with    Groin Pain     Left sided groin pain. Patient has been treating with Ibuprofen. Patient recently finished the Flagyl. Patient states it has improved since last visit but not fully gone.        History of Present Illness:        HPI  Patient is a 38-year-old female who reports for follow-up.  Patient was seen on 2024 for vaginal bleeding, abdominal cramping, and lower pelvic pain.  It was discovered that she was positive for trichomonas and Gardnerella.  Patient was treated with Flagyl twice daily x 10 days.  Patient states that her pain is much improved, however she is still having left lower pelvic pain.  Denies any vaginal bleeding or discharge at this time.  Past Medical History:     Past Medical History:   Diagnosis Date    Anxiety     Drug addict (HCC)     Hepatitis C     Liver disease       Reviewed all health maintenance requirements and ordered appropriate tests  Health Maintenance Due   Topic Date Due    Pneumococcal 0-64 years Vaccine (1 of 2 - PCV) Never done    Varicella vaccine (1 of 2 - 13+ 2-dose series) Never done    HIV screen  Never done    Hepatitis B vaccine (1 of 3 - 19+ 3-dose series) Never done    DTaP/Tdap/Td vaccine (1 - Tdap) Never done    Cervical cancer screen  Never done    COVID-19 Vaccine ( - - season) 2023    Lipids  Never done    Breast cancer screen  Never done    Flu vaccine (1) 2024       Past Surgical History:     Past Surgical History:   Procedure Laterality Date    TUBAL LIGATION          Medications:       Prior to Admission medications    Medication Sig Start Date End Date Taking? Authorizing Provider   Multiple Vitamin (MULTI VITAMIN PO) Take by mouth daily   Yes Provider, MD Raz   tiZANidine (ZANAFLEX) 4 MG tablet Take 1 tablet by mouth nightly as needed (muscle spasm) 24  Yes Gabino Esparaz DNP     08/31/2023 08:48 AM    CO2 28 08/31/2023 08:48 AM    BUN 4 08/31/2023 08:48 AM    CREATININE 0.6 08/31/2023 08:48 AM    GLUCOSE 100 08/31/2023 08:48 AM    BILITOT 0.4 08/31/2023 08:48 AM    ALKPHOS 81 08/31/2023 08:48 AM    AST 52 08/31/2023 08:48 AM    ALT 45 08/31/2023 08:48 AM     Lab Results   Component Value Date/Time    WBC 6.8 08/31/2023 08:48 AM    RBC 4.73 08/31/2023 08:48 AM    HGB 16.6 08/31/2023 08:48 AM    HCT 49.2 08/31/2023 08:48 AM    .9 08/31/2023 08:48 AM    MCH 35.1 08/31/2023 08:48 AM    MCHC 33.8 08/31/2023 08:48 AM    RDW 13.0 08/31/2023 08:48 AM     08/31/2023 08:48 AM     Lab Results   Component Value Date/Time    TSH 0.81 07/13/2023 05:16 PM     No results found for: \"CHOL\", \"LDL\", \"HDL\", \"PSA\", \"LABA1C\"       Assessment & Plan        Diagnosis Orders   1. Bacterial vaginosis  Vaginitis DNA Probe        Will repeat the nap.              Completed Refills   Requested Prescriptions      No prescriptions requested or ordered in this encounter     No follow-ups on file.  No orders of the defined types were placed in this encounter.    Orders Placed This Encounter   Procedures    Vaginitis DNA Probe     Standing Status:   Future     Standing Expiration Date:   8/30/2025         There are no Patient Instructions on file for this visit.    Electronically signed by Gabino Esparza DNP,APRN,CNP  on 8/30/2024 at 3:14 PM           Completed Refills   Requested Prescriptions      No prescriptions requested or ordered in this encounter

## 2024-08-31 LAB
CANDIDA SPECIES: NEGATIVE
GARDNERELLA VAGINALIS: POSITIVE
SOURCE: ABNORMAL
TRICHOMONAS: NEGATIVE

## 2024-09-04 ENCOUNTER — TELEPHONE (OUTPATIENT)
Dept: FAMILY MEDICINE CLINIC | Age: 39
End: 2024-09-04

## 2024-09-04 NOTE — TELEPHONE ENCOUNTER
Nola saw on her mychart that her test results are in but hasn't gotten a call. I did let her know Mark is off until Thursday. She would like a call with results.      Health Maintenance   Topic Date Due    Pneumococcal 0-64 years Vaccine (1 of 2 - PCV) Never done    Varicella vaccine (1 of 2 - 13+ 2-dose series) Never done    HIV screen  Never done    Hepatitis B vaccine (1 of 3 - 19+ 3-dose series) Never done    DTaP/Tdap/Td vaccine (1 - Tdap) Never done    Cervical cancer screen  Never done    Lipids  Never done    Breast cancer screen  Never done    Flu vaccine (1) 08/01/2024    COVID-19 Vaccine (4 - 2023-24 season) 09/01/2024    Depression Screen  05/14/2025    Hepatitis C screen  Completed    Hepatitis A vaccine  Aged Out    Hib vaccine  Aged Out    HPV vaccine  Aged Out    Polio vaccine  Aged Out    Meningococcal (ACWY) vaccine  Aged Out             (applicable per patient's age: Cancer Screenings, Depression Screening, Fall Risk Screening, Immunizations)    AST (U/L)   Date Value   08/31/2023 52 (H)     ALT (U/L)   Date Value   08/31/2023 45 (H)     BUN (mg/dL)   Date Value   08/31/2023 4 (L)      (goal A1C is < 7)   (goal LDL is <100) need 30-50% reduction from baseline     BP Readings from Last 3 Encounters:   08/30/24 104/70   08/09/24 120/78   05/14/24 120/60    (goal /80)      All Future Testing planned in [facility]:  Lab Frequency Next Occurrence   MEDARDO ISIAH DIGITAL SCREEN BILATERAL Once 08/09/2024   Trichomonas Vaginali, Molecular Once 08/22/2024       Next Visit Date:  No future appointments.         Patient Active Problem List:     Raynaud's disease without gangrene

## 2024-09-05 ENCOUNTER — TELEPHONE (OUTPATIENT)
Dept: FAMILY MEDICINE CLINIC | Age: 39
End: 2024-09-05

## 2024-09-05 RX ORDER — METRONIDAZOLE 500 MG/1
500 TABLET ORAL 2 TIMES DAILY
Qty: 14 TABLET | Refills: 0 | Status: SHIPPED | OUTPATIENT
Start: 2024-09-05 | End: 2024-09-12

## 2024-09-05 RX ORDER — METRONIDAZOLE 7.5 MG/G
GEL TOPICAL
Qty: 1 EACH | Refills: 0 | Status: SHIPPED | OUTPATIENT
Start: 2024-09-05

## 2024-09-05 NOTE — TELEPHONE ENCOUNTER
Patient phoned in and states she received a call from [employer]- they are unable to compound the vaginal suppositories. Any other recommendations?

## 2024-09-05 NOTE — TELEPHONE ENCOUNTER
We will need to call around and find a pharmacy that does have it.  This is the Intrachol part of the treatment plan

## 2024-09-05 NOTE — TELEPHONE ENCOUNTER
----- Message from Gabino Esparza DNP sent at 9/5/2024  8:53 AM EDT -----  Please let pt know that she is still positive for gardnerella, BV. There treatment for recurrent infection is as follows:    Metronidazole 500mg PO BID x 7 days then,  Boric acid gel caps 600mg intravaginally at HS x 21 days and then,  Metronidazole vaginal gel, 1 applicator, twice weekly for 16 weeks    Please pend these medications. Should abstain from sex during the entire treatment

## 2024-09-06 NOTE — TELEPHONE ENCOUNTER
Please let patient know that we will have to send the boric acid suppositories to a compounding pharmacy.  She can choose to go to [facility] or [facility].

## 2024-09-06 NOTE — TELEPHONE ENCOUNTER
I spoke with Dr Loyd office, they use [facility] in [place] or [facility], The suppository has to be compounded.

## 2024-11-13 ENCOUNTER — TELEPHONE (OUTPATIENT)
Dept: FAMILY MEDICINE CLINIC | Age: 39
End: 2024-11-13

## 2024-11-13 NOTE — TELEPHONE ENCOUNTER
Nola said she has been treated for BV a couple different times. She said her symptoms went away but have since come back. She said she has the urgency to pee and groin/back pain. Can something be called in for her? Does she need to come in and be seen/referred to OB/GYN. Patient did say she has not been sexually active with anyone.      Health Maintenance   Topic Date Due    Pneumococcal 0-64 years Vaccine (1 of 2 - PCV) Never done    Varicella vaccine (1 of 2 - 13+ 2-dose series) Never done    HIV screen  Never done    Hepatitis B vaccine (1 of 3 - 19+ 3-dose series) Never done    DTaP/Tdap/Td vaccine (1 - Tdap) Never done    Cervical cancer screen  Never done    Lipids  Never done    Breast cancer screen  Never done    Flu vaccine (1) 08/01/2024    COVID-19 Vaccine (4 - 2023-24 season) 09/01/2024    Depression Screen  05/14/2025    Hepatitis C screen  Completed    Hepatitis A vaccine  Aged Out    Hib vaccine  Aged Out    HPV vaccine  Aged Out    Polio vaccine  Aged Out    Meningococcal (ACWY) vaccine  Aged Out             (applicable per patient's age: Cancer Screenings, Depression Screening, Fall Risk Screening, Immunizations)    AST (U/L)   Date Value   08/31/2023 52 (H)     ALT (U/L)   Date Value   08/31/2023 45 (H)     BUN (mg/dL)   Date Value   08/31/2023 4 (L)      (goal A1C is < 7)   (goal LDL is <100) need 30-50% reduction from baseline     BP Readings from Last 3 Encounters:   08/30/24 104/70   08/09/24 120/78   05/14/24 120/60    (goal /80)      All Future Testing planned in [facility]:  Lab Frequency Next Occurrence   MEDARDO ISIAH DIGITAL SCREEN BILATERAL Once 08/09/2024   Trichomonas Vaginali, Molecular Once 08/22/2024       Next Visit Date:  No future appointments.         Patient Active Problem List:     Raynaud's disease without gangrene

## 2024-11-15 ENCOUNTER — HOSPITAL ENCOUNTER (OUTPATIENT)
Age: 39
Setting detail: SPECIMEN
Discharge: HOME | End: 2024-11-15
Payer: COMMERCIAL

## 2024-11-15 ENCOUNTER — NURSE ONLY (OUTPATIENT)
Dept: FAMILY MEDICINE CLINIC | Age: 39
End: 2024-11-15
Payer: COMMERCIAL

## 2024-11-15 DIAGNOSIS — R10.2 PELVIC PAIN: ICD-10-CM

## 2024-11-15 DIAGNOSIS — R35.0 URINE FREQUENCY: ICD-10-CM

## 2024-11-15 DIAGNOSIS — N30.01 ACUTE CYSTITIS WITH HEMATURIA: Primary | ICD-10-CM

## 2024-11-15 LAB
BILIRUBIN, POC: NEGATIVE
BLOOD URINE, POC: ABNORMAL
CLARITY, POC: ABNORMAL
COLOR, POC: YELLOW
GLUCOSE URINE, POC: NEGATIVE MG/DL
KETONES, POC: NEGATIVE MG/DL
LEUKOCYTE EST, POC: ABNORMAL
NITRITE, POC: POSITIVE
PH, POC: 6
PROTEIN, POC: NEGATIVE MG/DL
SPECIFIC GRAVITY, POC: 1.01
UROBILINOGEN, POC: 0.2 MG/DL

## 2024-11-15 PROCEDURE — 81002 URINALYSIS NONAUTO W/O SCOPE: CPT | Performed by: NURSE PRACTITIONER

## 2024-11-15 PROCEDURE — 87088 URINE BACTERIA CULTURE: CPT

## 2024-11-15 PROCEDURE — 87186 SC STD MICRODIL/AGAR DIL: CPT

## 2024-11-15 PROCEDURE — 87660 TRICHOMONAS VAGIN DIR PROBE: CPT

## 2024-11-15 PROCEDURE — 87480 CANDIDA DNA DIR PROBE: CPT

## 2024-11-15 PROCEDURE — 87086 URINE CULTURE/COLONY COUNT: CPT

## 2024-11-15 PROCEDURE — 87510 GARDNER VAG DNA DIR PROBE: CPT

## 2024-11-15 RX ORDER — NITROFURANTOIN 25; 75 MG/1; MG/1
100 CAPSULE ORAL 2 TIMES DAILY
Qty: 10 CAPSULE | Refills: 0 | Status: SHIPPED | OUTPATIENT
Start: 2024-11-15 | End: 2024-11-20

## 2024-11-15 NOTE — PROGRESS NOTES
UA reviewed.  Urine culture sent.  Will start patient on nitrofurantoin 100 mg p.o. twice daily x 5 days.

## 2024-11-16 LAB
CANDIDA SPECIES: NEGATIVE
GARDNERELLA VAGINALIS: POSITIVE
SOURCE: ABNORMAL
TRICHOMONAS: NEGATIVE

## 2024-11-17 LAB
MICROORGANISM SPEC CULT: ABNORMAL
SERVICE CMNT-IMP: ABNORMAL
SPECIMEN DESCRIPTION: ABNORMAL

## 2024-11-18 ENCOUNTER — TELEPHONE (OUTPATIENT)
Dept: FAMILY MEDICINE CLINIC | Age: 39
End: 2024-11-18

## 2024-11-18 DIAGNOSIS — N92.6 IRREGULAR MENSES: ICD-10-CM

## 2024-11-18 DIAGNOSIS — B96.89 BACTERIAL VAGINOSIS: Primary | ICD-10-CM

## 2024-11-18 DIAGNOSIS — N76.0 BACTERIAL VAGINOSIS: Primary | ICD-10-CM

## 2024-11-18 NOTE — TELEPHONE ENCOUNTER
Patient was notified of results, she did start the antibiotic.  She does not have a Ob/Gyn and would like referral placed.

## 2024-11-18 NOTE — TELEPHONE ENCOUNTER
----- Message from Gabino Esparza DNP sent at 11/18/2024  7:53 AM EST -----  Please let patient know that she does have bacterial vaginosis again.  Please find out if she started the antibiotic as she also does have a UTI.  I need her to see OB/GYN as soon as possible.  According my records, she was on the extended treatment for BV at the beginning of September and now she has it again.

## 2024-11-20 RX ORDER — METRONIDAZOLE 500 MG/1
500 TABLET ORAL 2 TIMES DAILY
Qty: 14 TABLET | Refills: 0 | Status: SHIPPED | OUTPATIENT
Start: 2024-11-20 | End: 2024-11-27

## 2024-11-20 NOTE — TELEPHONE ENCOUNTER
Start treatment for BV. Flagyl 500mg BID. It has already been sent to pharmacy. Keep appt with Dr Loyd and let him know she has had BV multiple times and even did the extended treatment.

## 2024-11-20 NOTE — TELEPHONE ENCOUNTER
Patient phoned in stating she took her last dose of Macrobid Bid x 5 daysand symptoms have not improved- Still having urinary frequency and discomfort.    Is there something else she can try?     Patient also wanted me to make you aware that she does have upcoming appt with Dr. Loyd.

## 2024-11-26 ENCOUNTER — OFFICE VISIT (OUTPATIENT)
Dept: OBGYN CLINIC | Age: 39
End: 2024-11-26
Payer: COMMERCIAL

## 2024-11-26 ENCOUNTER — HOSPITAL ENCOUNTER (OUTPATIENT)
Age: 39
Setting detail: SPECIMEN
Discharge: HOME | End: 2024-11-26
Payer: COMMERCIAL

## 2024-11-26 VITALS — BODY MASS INDEX: 26.31 KG/M2 | HEIGHT: 62 IN | WEIGHT: 143 LBS

## 2024-11-26 DIAGNOSIS — Z01.419 WOMEN'S ANNUAL ROUTINE GYNECOLOGICAL EXAMINATION: ICD-10-CM

## 2024-11-26 DIAGNOSIS — C53.9 CERVICAL CARCINOMA (HCC): ICD-10-CM

## 2024-11-26 DIAGNOSIS — N89.8 VAGINAL DISCHARGE: ICD-10-CM

## 2024-11-26 DIAGNOSIS — Z01.419 WOMEN'S ANNUAL ROUTINE GYNECOLOGICAL EXAMINATION: Primary | ICD-10-CM

## 2024-11-26 PROCEDURE — 88305 TISSUE EXAM BY PATHOLOGIST: CPT

## 2024-11-26 PROCEDURE — 88342 IMHCHEM/IMCYTCHM 1ST ANTB: CPT

## 2024-11-26 PROCEDURE — 99203 OFFICE O/P NEW LOW 30 MIN: CPT | Performed by: OBSTETRICS & GYNECOLOGY

## 2024-11-26 PROCEDURE — G0145 SCR C/V CYTO,THINLAYER,RESCR: HCPCS

## 2024-11-26 PROCEDURE — 87070 CULTURE OTHR SPECIMN AEROBIC: CPT

## 2024-11-26 RX ORDER — ALPRAZOLAM 1 MG/1
TABLET ORAL
Qty: 30 TABLET | Refills: 0 | Status: SHIPPED | OUTPATIENT
Start: 2024-11-26 | End: 2024-12-24

## 2024-11-26 NOTE — PROGRESS NOTES
PROBLEM VISIT     Date of service: 2024    Nola Llanes  Is a 39 y.o. single female    PT's PCP is: Gabino Esparza DNP     : 1985                                             Subjective:       Patient's last menstrual period was 2024.     OB History    Para Term  AB Living   5 5 5     5   SAB IAB Ectopic Molar Multiple Live Births             5      # Outcome Date GA Lbr Antonio/2nd Weight Sex Type Anes PTL Lv   5 Term      Vag-Spont   CHRIS   4 Term      Vag-Spont   CHRIS   3 Term      Vag-Spont   CHRIS   2 Term      Vag-Spont   CHRIS   1 Term      Vag-Spont   CHRIS        Social History     Tobacco Use   Smoking Status Every Day    Current packs/day: 0.50    Average packs/day: 0.5 packs/day for 18.6 years (9.3 ttl pk-yrs)    Types: Cigarettes    Start date: 2006    Passive exposure: Current   Smokeless Tobacco Never        Social History     Substance and Sexual Activity   Alcohol Use Not Currently    Alcohol/week: 10.0 standard drinks of alcohol    Types: 10 Cans of beer per week       Social History     Substance and Sexual Activity   Sexual Activity Not Currently       Allergies: Septra [sulfamethoxazole-trimethoprim]    Chief Complaint   Patient presents with    New Patient     Pt is here today as new patient. Pt states she gets recurrent BV and would like to discuss this today. Pt states everything such as boric acid suppositories seem to help until a week or so after she stops the medications. Pt reports she did prior have a uti but was treated twice she will take last dose of medication for second treatment tonight. Pt reports urine output has now increased to leaking fluid out of nowhere.        Last Yearly:  unknown    Last pap: unknown    Last HPV: unknown      NURSE: Agnes VELASQUEZ  PE:  Vital Signs  Height 1.575 m (5' 2\"), weight 64.9 kg (143 lb), last menstrual period 2024.     Labs:    No results found for this visit on 24.        HPI: The patient is

## 2024-11-27 ENCOUNTER — TELEPHONE (OUTPATIENT)
Dept: OBGYN | Age: 39
End: 2024-11-27

## 2024-11-27 NOTE — TELEPHONE ENCOUNTER
Dr jaquez did personally call the pt and speak to her. Apt with Dr Joshua was highly recommenced to be kept.

## 2024-11-27 NOTE — TELEPHONE ENCOUNTER
Pt has apt with Josiane on 12/6/24 however pt states she can not get off work this date and would like to know if we can send her to [place] or [place] instead.

## 2024-11-29 LAB
MICROORGANISM SPEC CULT: NORMAL
SERVICE CMNT-IMP: NORMAL
SPECIMEN DESCRIPTION: NORMAL

## 2024-11-30 LAB — SURGICAL PATHOLOGY REPORT: NORMAL

## 2024-12-02 ENCOUNTER — HOSPITAL ENCOUNTER (OUTPATIENT)
Dept: CT IMAGING | Age: 39
Discharge: HOME | End: 2024-12-04
Attending: OBSTETRICS & GYNECOLOGY
Payer: COMMERCIAL

## 2024-12-02 DIAGNOSIS — C53.9 CERVICAL CARCINOMA (HCC): ICD-10-CM

## 2024-12-02 PROCEDURE — 74177 CT ABD & PELVIS W/CONTRAST: CPT

## 2024-12-02 PROCEDURE — 6360000004 HC RX CONTRAST MEDICATION: Performed by: OBSTETRICS & GYNECOLOGY

## 2024-12-02 RX ORDER — IOPAMIDOL 755 MG/ML
75 INJECTION, SOLUTION INTRAVASCULAR
Status: COMPLETED | OUTPATIENT
Start: 2024-12-02 | End: 2024-12-02

## 2024-12-02 RX ADMIN — IOPAMIDOL 75 ML: 755 INJECTION, SOLUTION INTRAVENOUS at 13:11

## 2024-12-03 ENCOUNTER — TELEPHONE (OUTPATIENT)
Dept: OBGYN | Age: 39
End: 2024-12-03

## 2024-12-03 DIAGNOSIS — C53.9 CERVICAL CARCINOMA (HCC): Primary | ICD-10-CM

## 2024-12-03 RX ORDER — HYDROCODONE BITARTRATE AND ACETAMINOPHEN 5; 325 MG/1; MG/1
1 TABLET ORAL EVERY 4 HOURS PRN
Qty: 30 TABLET | Refills: 0 | Status: SHIPPED | OUTPATIENT
Start: 2024-12-03 | End: 2024-12-08

## 2024-12-03 NOTE — TELEPHONE ENCOUNTER
Pt informed, she states her work will not except a doctors note so she will  meds and continue with work.

## 2024-12-03 NOTE — TELEPHONE ENCOUNTER
Pt called in tearful that she had a bx on 11/26 and is a lot of pain and cannot function at work, ibuprofen is not working. Pt asked if she can have something for pain. She is scheduled with Dr Joshua for 12/6/24.    Addendum 12/3/2024 at 2:41 PM.  This patient has advanced cervical carcinoma eroding into the bladder and causing hydroureter.  I will cover her pain medication until she can get into see the GYN oncologist and has an appointment this Friday

## 2024-12-03 NOTE — TELEPHONE ENCOUNTER
Pt lvm yesterday as I was not in office asking about labs. Labs were placed and are active in epic pt was informed.

## 2024-12-05 ENCOUNTER — HOSPITAL ENCOUNTER (OUTPATIENT)
Age: 39
Discharge: HOME | End: 2024-12-05
Payer: COMMERCIAL

## 2024-12-05 DIAGNOSIS — C53.9 CERVICAL CARCINOMA (HCC): ICD-10-CM

## 2024-12-05 LAB
-: ABNORMAL
ALBUMIN SERPL-MCNC: 3.4 G/DL (ref 3.5–5.2)
ALP SERPL-CCNC: 72 U/L (ref 35–104)
ALT SERPL-CCNC: 14 U/L (ref 5–33)
ANION GAP SERPL CALCULATED.3IONS-SCNC: 17 MMOL/L (ref 9–17)
AST SERPL-CCNC: 15 U/L
BACTERIA URNS QL MICRO: ABNORMAL
BASOPHILS # BLD: 0.05 K/UL (ref 0–0.2)
BASOPHILS NFR BLD: 1 % (ref 0–2)
BILIRUB SERPL-MCNC: 0.2 MG/DL (ref 0.3–1.2)
BILIRUB UR QL STRIP: NEGATIVE
BUN SERPL-MCNC: 18 MG/DL (ref 6–20)
BUN/CREAT SERPL: 7 (ref 9–20)
CALCIUM SERPL-MCNC: 8.4 MG/DL (ref 8.6–10.4)
CHLORIDE SERPL-SCNC: 103 MMOL/L (ref 98–107)
CLARITY UR: ABNORMAL
CO2 SERPL-SCNC: 17 MMOL/L (ref 20–31)
COLOR UR: ABNORMAL
COMMENT: ABNORMAL
CREAT SERPL-MCNC: 2.7 MG/DL (ref 0.5–0.9)
EOSINOPHIL # BLD: 0.45 K/UL (ref 0–0.4)
EOSINOPHILS RELATIVE PERCENT: 4 % (ref 0–5)
ERYTHROCYTE [DISTWIDTH] IN BLOOD BY AUTOMATED COUNT: 13 % (ref 12.1–15.2)
GFR, ESTIMATED: 22 ML/MIN/1.73M2
GLUCOSE SERPL-MCNC: 73 MG/DL (ref 70–99)
GLUCOSE UR STRIP-MCNC: NEGATIVE MG/DL
HCT VFR BLD AUTO: 26 % (ref 36–46)
HGB BLD-MCNC: 8.7 G/DL (ref 12–16)
HGB UR QL STRIP.AUTO: ABNORMAL
IMM GRANULOCYTES # BLD AUTO: 0.03 K/UL (ref 0–0.3)
IMM GRANULOCYTES NFR BLD: 0 % (ref 0–5)
KETONES UR STRIP-MCNC: NEGATIVE MG/DL
LEUKOCYTE ESTERASE UR QL STRIP: ABNORMAL
LYMPHOCYTES NFR BLD: 1.77 K/UL (ref 1–4.8)
LYMPHOCYTES RELATIVE PERCENT: 16 % (ref 15–40)
MCH RBC QN AUTO: 32.6 PG (ref 26–34)
MCHC RBC AUTO-ENTMCNC: 33.5 G/DL (ref 31–37)
MCV RBC AUTO: 97.4 FL (ref 80–100)
MONOCYTES NFR BLD: 0.63 K/UL (ref 0–1)
MONOCYTES NFR BLD: 6 % (ref 4–8)
NEUTROPHILS NFR BLD: 73 % (ref 47–75)
NEUTS SEG NFR BLD: 8.02 K/UL (ref 2.5–7)
NITRITE UR QL STRIP: NEGATIVE
PH UR STRIP: 8 [PH] (ref 5–8)
PLATELET # BLD AUTO: 339 K/UL (ref 140–450)
PMV BLD AUTO: 9.1 FL (ref 6–12)
POTASSIUM SERPL-SCNC: 5.2 MMOL/L (ref 3.7–5.3)
PROT SERPL-MCNC: 6.6 G/DL (ref 6.4–8.3)
PROT UR STRIP-MCNC: ABNORMAL MG/DL
RBC # BLD AUTO: 2.67 M/UL (ref 4–5.2)
RBC #/AREA URNS HPF: ABNORMAL /HPF (ref 0–2)
SODIUM SERPL-SCNC: 137 MMOL/L (ref 135–144)
SP GR UR STRIP: 1.01 (ref 1–1.03)
UROBILINOGEN UR STRIP-ACNC: NORMAL EU/DL (ref 0–1)
WBC #/AREA URNS HPF: ABNORMAL /HPF
WBC OTHER # BLD: 11 K/UL (ref 3.5–11)

## 2024-12-05 PROCEDURE — 36415 COLL VENOUS BLD VENIPUNCTURE: CPT

## 2024-12-05 PROCEDURE — 87086 URINE CULTURE/COLONY COUNT: CPT

## 2024-12-05 PROCEDURE — 81001 URINALYSIS AUTO W/SCOPE: CPT

## 2024-12-05 PROCEDURE — 85025 COMPLETE CBC W/AUTO DIFF WBC: CPT

## 2024-12-05 PROCEDURE — 87186 SC STD MICRODIL/AGAR DIL: CPT

## 2024-12-05 PROCEDURE — 87077 CULTURE AEROBIC IDENTIFY: CPT

## 2024-12-05 PROCEDURE — 80053 COMPREHEN METABOLIC PANEL: CPT

## 2024-12-06 ENCOUNTER — OFFICE VISIT (OUTPATIENT)
Dept: GYNECOLOGIC ONCOLOGY | Age: 39
End: 2024-12-06
Payer: COMMERCIAL

## 2024-12-06 ENCOUNTER — TELEPHONE (OUTPATIENT)
Dept: GYNECOLOGIC ONCOLOGY | Age: 39
End: 2024-12-06

## 2024-12-06 VITALS
SYSTOLIC BLOOD PRESSURE: 115 MMHG | TEMPERATURE: 98.4 F | HEART RATE: 100 BPM | DIASTOLIC BLOOD PRESSURE: 74 MMHG | OXYGEN SATURATION: 100 % | WEIGHT: 145.4 LBS | BODY MASS INDEX: 26.59 KG/M2

## 2024-12-06 DIAGNOSIS — C53.9 MALIGNANT NEOPLASM OF CERVIX, UNSPECIFIED SITE (HCC): Primary | ICD-10-CM

## 2024-12-06 DIAGNOSIS — N82.0 VESICOVAGINAL FISTULA: ICD-10-CM

## 2024-12-06 PROCEDURE — 99205 OFFICE O/P NEW HI 60 MIN: CPT | Performed by: OBSTETRICS & GYNECOLOGY

## 2024-12-06 ASSESSMENT — ENCOUNTER SYMPTOMS
BACK PAIN: 0
DIARRHEA: 0
SCLERAL ICTERUS: 0
CHEST TIGHTNESS: 0
WHEEZING: 0
VOICE CHANGE: 0
NAUSEA: 0
SORE THROAT: 0
SHORTNESS OF BREATH: 0
ABDOMINAL DISTENTION: 0
TROUBLE SWALLOWING: 0
ABDOMINAL PAIN: 0
EYE PROBLEMS: 0
VOMITING: 0
CONSTIPATION: 0
RECTAL PAIN: 0
BLOOD IN STOOL: 0
HEMOPTYSIS: 0
COUGH: 0

## 2024-12-06 NOTE — PROGRESS NOTES
Nola Llanes is a 39 y.o. female that presents today for cervix cancer and vesicovaginal fistula        HPI:    39 y.o.  woman, seen in consultation from Tom Loyd MD for cervix cancer, fistula  Biopsy reveals NATHAN 3 but on his exam and my exam today this lady has a huge advanced cervical cancer  I revd her notes, records, history in detail today  I counseled her extensively today  I answered all questions to her satisfaction today  She tells me that she understands everything that I explained today to her        ROS:  I have personally reviewed and agree with the review of systems done by my ancillary staff in the EPIC documentation.        Past Medical History:   Diagnosis Date    Anxiety     Drug addict (HCC)     Hepatitis C     Liver disease        Past Surgical History:   Procedure Laterality Date    TUBAL LIGATION  2015       Family History   Problem Relation Age of Onset    No Known Problems Mother     Alcohol Abuse Father     Diabetes Maternal Grandmother        Social History:   No history of drug, alcohol, or tobacco abuse.      Current Outpatient Medications   Medication Sig Dispense Refill    HYDROcodone-acetaminophen (NORCO) 5-325 MG per tablet Take 1 tablet by mouth every 4 hours as needed for Pain for up to 5 days. Intended supply: 5 days. Take lowest dose possible to manage pain Max Daily Amount: 6 tablets 30 tablet 0    ALPRAZolam (XANAX) 1 MG tablet Take 1/2 tablet to 1 full tablet as needed for anxiety or restlessness up to 3 times daily 30 tablet 0    Multiple Vitamin (MULTI VITAMIN PO) Take by mouth daily      tiZANidine (ZANAFLEX) 4 MG tablet Take 1 tablet by mouth nightly as needed (muscle spasm) 30 tablet 2     No current facility-administered medications for this visit.       Allergies   Allergen Reactions    Septra [Sulfamethoxazole-Trimethoprim] Hives       Physical Examination:  Vitals:    12/06/24 0806 12/06/24 0809   BP: 115/74    Site: Right Upper Arm    Position:

## 2024-12-06 NOTE — PROGRESS NOTES
Review of Systems   Constitutional:  Positive for appetite change (decreased). Negative for chills, diaphoresis, fatigue, fever and unexpected weight change.   HENT:   Negative for hearing loss, lump/mass, mouth sores, nosebleeds, sore throat, tinnitus, trouble swallowing and voice change.    Eyes:  Negative for eye problems and icterus.   Respiratory:  Negative for chest tightness, cough, hemoptysis, shortness of breath and wheezing.    Cardiovascular:  Negative for chest pain, leg swelling and palpitations.   Gastrointestinal:  Negative for abdominal distention, abdominal pain, blood in stool, constipation, diarrhea, nausea, rectal pain and vomiting.   Endocrine: Negative for hot flashes.   Genitourinary:  Positive for bladder incontinence, difficulty urinating, dysuria, hematuria, pelvic pain, vaginal bleeding and vaginal discharge. Negative for dyspareunia, frequency, menstrual problem and nocturia.    Musculoskeletal:  Negative for arthralgias, back pain, flank pain, gait problem, myalgias, neck pain and neck stiffness.   Skin:  Negative for itching, rash and wound.   Neurological:  Negative for dizziness, extremity weakness, gait problem, headaches, light-headedness, numbness, seizures and speech difficulty.   Hematological:  Negative for adenopathy. Does not bruise/bleed easily.   Psychiatric/Behavioral:  Positive for depression. Negative for confusion, decreased concentration, sleep disturbance and suicidal ideas. The patient is not nervous/anxious.

## 2024-12-06 NOTE — TELEPHONE ENCOUNTER
Called and informed patient that referrals faxed to appropriate offices.  Also provided patient with [facility] Scheduling for imaging scheduling.  Patient to see Dr. Estevez at [facility].  Instructed patient to call writer if she has not heard from Rad onc or gyn onc referrals in next week.  Patient voiced understanding and appreciation.

## 2024-12-08 LAB
MICROORGANISM SPEC CULT: ABNORMAL
MICROORGANISM SPEC CULT: ABNORMAL
SPECIMEN DESCRIPTION: ABNORMAL

## 2024-12-09 DIAGNOSIS — N30.01 ACUTE CYSTITIS WITH HEMATURIA: Primary | ICD-10-CM

## 2024-12-09 DIAGNOSIS — N39.0 URINARY TRACT INFECTION WITHOUT HEMATURIA, SITE UNSPECIFIED: Primary | ICD-10-CM

## 2024-12-09 RX ORDER — NITROFURANTOIN 25; 75 MG/1; MG/1
100 CAPSULE ORAL 2 TIMES DAILY
Qty: 20 CAPSULE | Refills: 0 | OUTPATIENT
Start: 2024-12-09 | End: 2024-12-19

## 2024-12-09 RX ORDER — CEPHALEXIN 500 MG/1
500 CAPSULE ORAL 3 TIMES DAILY
Qty: 21 CAPSULE | Refills: 0 | Status: SHIPPED | OUTPATIENT
Start: 2024-12-09 | End: 2024-12-16

## 2024-12-09 NOTE — TELEPHONE ENCOUNTER
Rx refill request via [employer]  Tizanidine 4mg qhs prn  Last OV 8-9-24 for low back strain  Next appt- none

## 2024-12-10 LAB — CYTOLOGY REPORT: NORMAL

## 2024-12-12 ENCOUNTER — HOSPITAL ENCOUNTER (OUTPATIENT)
Age: 39
Discharge: HOME | End: 2024-12-12
Payer: COMMERCIAL

## 2024-12-12 DIAGNOSIS — C53.9 CERVICAL CANCER: Primary | ICD-10-CM

## 2024-12-12 LAB
BASOPHILS # BLD: 0.05 K/UL (ref 0–0.2)
BASOPHILS NFR BLD: 1 % (ref 0–2)
EOSINOPHIL # BLD: 0.44 K/UL (ref 0–0.4)
EOSINOPHILS RELATIVE PERCENT: 4 % (ref 0–5)
ERYTHROCYTE [DISTWIDTH] IN BLOOD BY AUTOMATED COUNT: 13.6 % (ref 12.1–15.2)
HCT VFR BLD AUTO: 23.8 % (ref 36–46)
HGB BLD-MCNC: 8.1 G/DL (ref 12–16)
HPV I/H RISK 4 DNA CVX QL NAA+PROBE: DETECTED
HPV SAMPLE: ABNORMAL
HPV, INTERPRETATION: ABNORMAL
HPV16 DNA CVX QL NAA+PROBE: NOT DETECTED
HPV18 DNA CVX QL NAA+PROBE: NOT DETECTED
IMM GRANULOCYTES # BLD AUTO: 0.04 K/UL (ref 0–0.3)
IMM GRANULOCYTES NFR BLD: 0 % (ref 0–5)
INR PPP: 0.9
LYMPHOCYTES NFR BLD: 2.71 K/UL (ref 1–4.8)
LYMPHOCYTES RELATIVE PERCENT: 26 % (ref 15–40)
MCH RBC QN AUTO: 32.7 PG (ref 26–34)
MCHC RBC AUTO-ENTMCNC: 34 G/DL (ref 31–37)
MCV RBC AUTO: 96 FL (ref 80–100)
MONOCYTES NFR BLD: 0.76 K/UL (ref 0–1)
MONOCYTES NFR BLD: 7 % (ref 4–8)
NEUTROPHILS NFR BLD: 62 % (ref 47–75)
NEUTS SEG NFR BLD: 6.34 K/UL (ref 2.5–7)
PLATELET # BLD AUTO: 425 K/UL (ref 140–450)
PMV BLD AUTO: 8.6 FL (ref 6–12)
PROTHROMBIN TIME: 12.1 SEC (ref 11.5–14.2)
RBC # BLD AUTO: 2.48 M/UL (ref 4–5.2)
SPECIMEN DESCRIPTION: ABNORMAL
WBC OTHER # BLD: 10.3 K/UL (ref 3.5–11)

## 2024-12-12 PROCEDURE — 85610 PROTHROMBIN TIME: CPT

## 2024-12-12 PROCEDURE — 85025 COMPLETE CBC W/AUTO DIFF WBC: CPT

## 2024-12-12 PROCEDURE — 36415 COLL VENOUS BLD VENIPUNCTURE: CPT

## 2024-12-13 ENCOUNTER — HOSPITAL ENCOUNTER (OUTPATIENT)
Dept: RADIOLOGY | Facility: HOSPITAL | Age: 39
Discharge: HOME | End: 2024-12-13
Payer: COMMERCIAL

## 2024-12-13 VITALS
SYSTOLIC BLOOD PRESSURE: 111 MMHG | HEART RATE: 72 BPM | TEMPERATURE: 96.8 F | OXYGEN SATURATION: 98 % | RESPIRATION RATE: 16 BRPM | DIASTOLIC BLOOD PRESSURE: 69 MMHG

## 2024-12-13 DIAGNOSIS — C53.9 MALIGNANT NEOPLASM OF CERVIX UTERI, UNSPECIFIED: ICD-10-CM

## 2024-12-13 DIAGNOSIS — N13.30 UNSPECIFIED HYDRONEPHROSIS: ICD-10-CM

## 2024-12-13 PROCEDURE — 2500000004 HC RX 250 GENERAL PHARMACY W/ HCPCS (ALT 636 FOR OP/ED): Performed by: RADIOLOGY

## 2024-12-13 PROCEDURE — 50432 PLMT NEPHROSTOMY CATHETER: CPT | Mod: RT | Performed by: RADIOLOGY

## 2024-12-13 PROCEDURE — 76937 US GUIDE VASCULAR ACCESS: CPT | Performed by: RADIOLOGY

## 2024-12-13 PROCEDURE — 7100000010 HC PHASE TWO TIME - EACH INCREMENTAL 1 MINUTE

## 2024-12-13 PROCEDURE — C1894 INTRO/SHEATH, NON-LASER: HCPCS

## 2024-12-13 PROCEDURE — 7100000009 HC PHASE TWO TIME - INITIAL BASE CHARGE

## 2024-12-13 PROCEDURE — 99153 MOD SED SAME PHYS/QHP EA: CPT

## 2024-12-13 PROCEDURE — 99152 MOD SED SAME PHYS/QHP 5/>YRS: CPT

## 2024-12-13 PROCEDURE — 2720000007 HC OR 272 NO HCPCS

## 2024-12-13 PROCEDURE — C1729 CATH, DRAINAGE: HCPCS

## 2024-12-13 PROCEDURE — 2550000001 HC RX 255 CONTRASTS: Performed by: RADIOLOGY

## 2024-12-13 PROCEDURE — 2500000004 HC RX 250 GENERAL PHARMACY W/ HCPCS (ALT 636 FOR OP/ED): Performed by: STUDENT IN AN ORGANIZED HEALTH CARE EDUCATION/TRAINING PROGRAM

## 2024-12-13 RX ORDER — FENTANYL CITRATE 50 UG/ML
INJECTION, SOLUTION INTRAMUSCULAR; INTRAVENOUS
Status: COMPLETED | OUTPATIENT
Start: 2024-12-13 | End: 2024-12-13

## 2024-12-13 RX ORDER — MIDAZOLAM HYDROCHLORIDE 1 MG/ML
INJECTION INTRAMUSCULAR; INTRAVENOUS
Status: COMPLETED | OUTPATIENT
Start: 2024-12-13 | End: 2024-12-13

## 2024-12-13 RX ORDER — CEFTRIAXONE 2 G/50ML
2 INJECTION, SOLUTION INTRAVENOUS ONCE
Status: COMPLETED | OUTPATIENT
Start: 2024-12-13 | End: 2024-12-13

## 2024-12-13 ASSESSMENT — PAIN SCALES - GENERAL
PAINLEVEL_OUTOF10: 0 - NO PAIN
PAINLEVEL_OUTOF10: 7
PAINLEVEL_OUTOF10: 7
PAINLEVEL_OUTOF10: 0 - NO PAIN
PAINLEVEL_OUTOF10: 8
PAINLEVEL_OUTOF10: 7
PAINLEVEL_OUTOF10: 0 - NO PAIN
PAINLEVEL_OUTOF10: 8
PAINLEVEL_OUTOF10: 8
PAINLEVEL_OUTOF10: 0 - NO PAIN
PAINLEVEL_OUTOF10: 0 - NO PAIN

## 2024-12-13 ASSESSMENT — PAIN - FUNCTIONAL ASSESSMENT
PAIN_FUNCTIONAL_ASSESSMENT: 0-10

## 2024-12-13 NOTE — PRE-PROCEDURE NOTE
INTERVENTIONAL RADIOLOGY PRE-PROCEDURE NOTE    Madalyn Manzano is a 39 y.o. female with PMHx of cervical cancer with obstructive uropathy who presents to the interventional radiology department for bilateral nephrostomy tube placement.    Procedure: bilateral nephrostomy tube placement    Indication for procedure: Diagnoses of Unspecified hydronephrosis and Malignant neoplasm of cervix uteri, unspecified were pertinent to this visit.    No past medical history on file.   No past surgical history on file.    Planned Sedation/Anesthesia: Moderate    Directed physical examination:    General: Normal appearance, behavior, cognition and NAD  Heart: Heart regular rate and rhythm  Lungs: No increased work of breathing  Abdomen: soft and nontender  Psych: oriented to time, place and person    No current outpatient medications on file.    Current Facility-Administered Medications:     cefTRIAXone (Rocephin) 2 g in dextrose (iso) IV 50 mL, 2 g, intravenous, Once, Arnulfo Nieto MD     Mallampati: II (hard and soft palate, upper portion of tonsils anduvula visible)    ASA Score: ASA 3 - Patient with moderate systemic disease with functional limitations    Benefits, risks and alternatives of procedure and planned sedation have been discussed with the patient and/or their representative. All questions answered and they agree to proceed.     Arnulfo Nieto MD, PGY-6  Vascular & Interventional Radiology  IR pager: [phone]    NON-Urgent on call weekends and after hours weekdays (5pm - 5am) IR pager: [phone]  Urgent & emergent on call weekends and after hours weekdays (5pm-7am) IR pager: [phone]

## 2024-12-13 NOTE — RESULT ENCOUNTER NOTE
Lab notified that I did not order these tests.   states that she will figure out who ordered and get the results of that provider

## 2024-12-13 NOTE — POST-PROCEDURE NOTE
Interventional Radiology Post-Procedure Note    Bilateral Nephrostomy tube placement    Procedure Details:  Technically successful and uncomplicated bilateral nephrostomy tube placement.  Please see PACS for full procedural details.    Patient Tolerance: good  Complications: None    Indication for procedure: Diagnoses of Unspecified hydronephrosis and Malignant neoplasm of cervix uteri, unspecified were pertinent to this visit.    Pre-Procedure Verification and Time Out:  · Procedure Location procedure area   · HUDDLE - Pre-procedure Verification completed   · TIME OUT - Final Verification completed immediately prior to procedure start   · DEBRIEF completed     General Information:  Date/Time of Procedure: 12/13/24 at 2:36 PM  Indication(s): obstructive uropathy  Findings: See PACS  Procedure performed by: Arnulfo Nieto MD   Assistant(s): Dr. Wilber Muro MD  Estimated Blood Loss (mL): minimal  Specimen: No  Informed Consent: written consent obtained    Prep:  Ultrasound Guided Insertion: Yes  Large Drape, Hand Hygiene, Surgical Cap, Surgical Mask, Sterile Gown, Sterile Gloves, Glasses, and Scrubs  Patient Position: Prone  Site Prep: chlorhexidine, draped, usual sterile procedure followed    Anesthesia/Medications:  Procedural Sedation:  Fentanyl: 200 mcg  Versed: 4 mg  1% Lidocaine: 18 mL  Additional Medications:  2g Ceftriaxone IV    Arnulfo Nieto MD, PGY-6  Interventional Radiology  IR pager: [phone]    NON-Urgent on call weekends and after hours weekdays (5pm - 5am) IR pager: [phone]  Urgent & emergent on call weekends and after hours weekdays (5pm-7am) IR pager: [phone]

## 2024-12-13 NOTE — DISCHARGE INSTRUCTIONS
You received moderate sedation:  - Do not drive a car, or operate any machinery or power tools of any kind for 24 hours.  - Do not drink any alcoholic drinks for 24 hours.  - Do not take any over the counter medications that may cause drowsiness for 24 hours.  - Do not make any important decisions or sign any legal documents for 24 hours.  - You need to have a responsible adult accompany you home.  - You may resume your normal diet.  - We strongly suggest that a responsible adult be with you for the rest of the day and also during the night. This is for your protection and safety.     For questions related to your procedure:  Please call [phone] between the hours of 7:00am-5:00pm Monday through Friday.  Please call [phone] after 5:00pm and on weekends and holidays.     In the event of an emergency call [phone] or go to your nearest emergency room.

## 2024-12-16 ENCOUNTER — OFFICE VISIT (OUTPATIENT)
Dept: ONCOLOGY | Age: 39
End: 2024-12-16
Payer: COMMERCIAL

## 2024-12-16 VITALS
SYSTOLIC BLOOD PRESSURE: 99 MMHG | BODY MASS INDEX: 25.21 KG/M2 | HEART RATE: 94 BPM | HEIGHT: 62 IN | TEMPERATURE: 97.8 F | DIASTOLIC BLOOD PRESSURE: 69 MMHG | WEIGHT: 137 LBS | OXYGEN SATURATION: 98 %

## 2024-12-16 DIAGNOSIS — C53.0 MALIGNANT NEOPLASM OF ENDOCERVIX (HCC): Primary | ICD-10-CM

## 2024-12-16 DIAGNOSIS — N13.9 OBSTRUCTIVE UROPATHY: ICD-10-CM

## 2024-12-16 PROCEDURE — 99205 OFFICE O/P NEW HI 60 MIN: CPT | Performed by: INTERNAL MEDICINE

## 2024-12-16 RX ORDER — HYDROCODONE BITARTRATE AND ACETAMINOPHEN 5; 325 MG/1; MG/1
TABLET ORAL
COMMUNITY
Start: 2024-12-12

## 2024-12-16 NOTE — PROGRESS NOTES
Patient ID: Nola Llanes, 1985, [id number], 39 y.o.  Referred by :  No ref. provider found   Reason for consultation: cervical cancer, staging in progress      HISTORY OF PRESENT ILLNESS:    Oncologic History:    Nola Llanes is a very pleasant 39 y.o. female.  Who was seen initially by GYN for NATHAN-3 and found to have advanced cervical cancer with fistula with obstructive uropathy status post bilateral nephrostomy tube placement at this time MRI PET/CT pending she was seen by GYN oncology and plan to proceed with concurrent chemo RT pending staging  Pathology as below      11/26/24 0000    Surgical Pathology Report Path Number: [id number]    -- Diagnosis --  CERVIX, BIOPSY:  - HIGH-GRADE SQUAMOUS INTRAEPITHELIAL LESION (NATHAN 3).  SEE COMMENT.   COMMENT: IMMUNOSTAIN FOR p16 (CONTROL APPROPRIATE) DEMONSTRATES A   POSITIVE STAINING PATTERN IN THE SQUAMOUS EPITHELIUM CONSISTENT WITH A   HIGH-GRADE SQUAMOUS INTRAEPITHELIAL LESION (NATHAN 3).  FOCAL INVOLVEMENT   OF ENDOCERVICAL EPITHELIUM BY THE HIGH-GRADE INTRAEPITHELIAL LESION IS   PRESENT.  MILD CHRONIC CERVICITIS IS PRESENT.  THERE IS NO EVIDENCE OF   INVASIVE NEOPLASIA IN THE BIOPSY.     Patient's met with radiation oncology and plan to start concurrent chemo RT however there was discussion about a neoadjuvant chemotherapy    Past Medical History:   Diagnosis Date    Anxiety     Drug addict (HCC)     Hepatitis C     Liver disease        Past Surgical History:   Procedure Laterality Date    TUBAL LIGATION  2015       Allergies   Allergen Reactions    Septra [Sulfamethoxazole-Trimethoprim] Hives       Current Outpatient Medications   Medication Sig Dispense Refill    HYDROcodone-acetaminophen (NORCO) 5-325 MG per tablet TAKE 1 TABLET BY MOUTH EVERY 4 HOURS AS NEEDED FOR PAIN FOR 14 DAYS      cephALEXin (KEFLEX) 500 MG capsule Take 1 capsule by mouth 3 times daily for 7 days 21 capsule 0    ALPRAZolam (XANAX) 1 MG tablet Take 1/2 tablet to 1 full tablet

## 2024-12-18 ENCOUNTER — TELEPHONE (OUTPATIENT)
Dept: ONCOLOGY | Age: 39
End: 2024-12-18

## 2024-12-18 NOTE — TELEPHONE ENCOUNTER
Name: Nola Llanes  : 1985  MRN: [id number]    Oncology Navigation- Initial Note: First attempt    Intake-  Referral from Dr. Estevez    Notes: Call made to Nola.  No answer.  Left message requesting call back. Contact information left on VM.      Electronically signed by Ghada Fragoso RN on 2024 at 4:23 PM

## 2024-12-23 DIAGNOSIS — C53.9 MALIGNANT NEOPLASM OF CERVIX, UNSPECIFIED SITE (HCC): ICD-10-CM

## 2024-12-30 ENCOUNTER — TELEPHONE (OUTPATIENT)
Dept: GYNECOLOGIC ONCOLOGY | Age: 39
End: 2024-12-30

## 2024-12-30 ENCOUNTER — TELEPHONE (OUTPATIENT)
Dept: ONCOLOGY | Age: 39
End: 2024-12-30

## 2024-12-30 NOTE — TELEPHONE ENCOUNTER
Name: Nola Llanes  : 1985  MRN: [id number]    Oncology Navigation- Initial Note:    Intake-  Contact Type: Telephone    Continuum of Care: Diagnosis/Active Treatment    Notes: Call made to Nola. Pt is following with Dr. Werner Bernardo, Radiation Oncologist at [facility] [place].  Pt plans to have treatment in [place] with Dr. Estevez.     Barriers to care discussed.  Nola denies transportation issues and states her mother and her both have FMLA.  Discussed Cancer Services as pt lives in [facility].  Nola states she had been contacted by them but has not called back. Writer's contact information given. Pt encouraged to call with any questions or needs. Will continue to follow.      Pt has VV follow up with Dr. Estevez on 24. F/u with Dr. Joshua 3/7/25.    Electronically signed by Ghada Fragoso RN on 2024 at 4:02 PM

## 2024-12-30 NOTE — TELEPHONE ENCOUNTER
Confirmed with Dr. Rashid's office that referral was received.  Office has called patient and left vm to call to schedule.

## 2024-12-30 NOTE — TELEPHONE ENCOUNTER
Called to confirm [facility] Gyn Onc appt.  Spoke with Rajni, was asked to refax referral.  Confirmed fax was correct.  Patient to be called and scheduled and Rajni to call writer with date.

## 2024-12-31 ENCOUNTER — PREP FOR PROCEDURE (OUTPATIENT)
Dept: RADIOLOGY | Facility: HOSPITAL | Age: 39
End: 2024-12-31
Payer: COMMERCIAL

## 2024-12-31 DIAGNOSIS — N13.30 UNSPECIFIED HYDRONEPHROSIS: Primary | ICD-10-CM

## 2025-01-02 ENCOUNTER — TELEPHONE (OUTPATIENT)
Dept: ONCOLOGY | Age: 40
End: 2025-01-02

## 2025-01-02 NOTE — TELEPHONE ENCOUNTER
Name: Nola Llanes  : 1985  MRN: [id number]    Oncology Navigation Follow-Up Note    Contact Type:  Telephone    Notes: Call made to Nola after receiving VM stating she is having issues with her nephrostomy tubes, and asking for recommendations so she did not have to drive 2 hours to  in [place]. Spoke to Nola who states she had some redness and drainage at the left tube site 2 days ago.  Has been cleaning it with mild dial soap and notes it feels better.  Denies fever.  States no drainage x 2 days and it feels much better.  Nola states she has an appointment with her radiation oncologist today and will have her assess and see if she needs to make an appointment with her nephrologist.  Pt states the tubes are working fine without issue.  Pt was informed to go to ER if fever or worsening signs of infection. Writer re-in forced education. Discussed talking with nephrologist at  about being established with local nephrologist as well.  Nola thanked writer for calling.  Encouraged Nola to call with further questions or needs.     Follow up by VV with Dr. Estevez 24    Electronically signed by Ghada Fragoso RN on 2025 at 9:34 AM

## 2025-01-06 ENCOUNTER — TELEMEDICINE (OUTPATIENT)
Age: 40
End: 2025-01-06
Payer: COMMERCIAL

## 2025-01-06 DIAGNOSIS — C53.0 MALIGNANT NEOPLASM OF ENDOCERVIX (HCC): Primary | ICD-10-CM

## 2025-01-06 PROCEDURE — 99215 OFFICE O/P EST HI 40 MIN: CPT | Performed by: INTERNAL MEDICINE

## 2025-01-06 NOTE — PATIENT INSTRUCTIONS
Oncology nurse navigator  Start induction chemotherapy next week(first cycle can be given without port  RTC with chemotherapy  Need to document weight so chemo can be calculated ASAP

## 2025-01-06 NOTE — PROGRESS NOTES
which may escape proof reading.  It such instances, actual meaning can be extrapolated by contextual diversion.

## 2025-01-08 DIAGNOSIS — C53.0 MALIGNANT NEOPLASM OF ENDOCERVIX (HCC): Primary | ICD-10-CM

## 2025-01-08 RX ORDER — LIDOCAINE/PRILOCAINE 2.5 %-2.5%
CREAM (GRAM) TOPICAL
Qty: 30 G | Refills: 1 | Status: SHIPPED | OUTPATIENT
Start: 2025-01-08

## 2025-01-08 RX ORDER — ACETAMINOPHEN 325 MG/1
650 TABLET ORAL
OUTPATIENT
Start: 2025-01-08

## 2025-01-08 RX ORDER — ONDANSETRON 2 MG/ML
8 INJECTION INTRAMUSCULAR; INTRAVENOUS
OUTPATIENT
Start: 2025-01-08

## 2025-01-08 RX ORDER — EPINEPHRINE 1 MG/ML
0.3 INJECTION, SOLUTION, CONCENTRATE INTRAVENOUS PRN
OUTPATIENT
Start: 2025-01-08

## 2025-01-08 RX ORDER — FAMOTIDINE 10 MG/ML
20 INJECTION, SOLUTION INTRAVENOUS
OUTPATIENT
Start: 2025-01-08

## 2025-01-08 RX ORDER — SODIUM CHLORIDE 0.9 % (FLUSH) 0.9 %
5-40 SYRINGE (ML) INJECTION PRN
OUTPATIENT
Start: 2025-01-08

## 2025-01-08 RX ORDER — SODIUM CHLORIDE 9 MG/ML
25 INJECTION, SOLUTION INTRAVENOUS PRN
OUTPATIENT
Start: 2025-01-08

## 2025-01-08 RX ORDER — ONDANSETRON 8 MG/1
8 TABLET, ORALLY DISINTEGRATING ORAL EVERY 8 HOURS PRN
Qty: 60 TABLET | Refills: 1 | Status: SHIPPED | OUTPATIENT
Start: 2025-01-08

## 2025-01-08 RX ORDER — DIPHENHYDRAMINE HYDROCHLORIDE 50 MG/ML
50 INJECTION INTRAMUSCULAR; INTRAVENOUS
OUTPATIENT
Start: 2025-01-08

## 2025-01-08 RX ORDER — ALBUTEROL SULFATE 90 UG/1
4 INHALANT RESPIRATORY (INHALATION) PRN
OUTPATIENT
Start: 2025-01-08

## 2025-01-08 RX ORDER — HEPARIN SODIUM (PORCINE) LOCK FLUSH IV SOLN 100 UNIT/ML 100 UNIT/ML
500 SOLUTION INTRAVENOUS PRN
OUTPATIENT
Start: 2025-01-08

## 2025-01-08 RX ORDER — SODIUM CHLORIDE 9 MG/ML
INJECTION, SOLUTION INTRAVENOUS CONTINUOUS
OUTPATIENT
Start: 2025-01-08

## 2025-01-08 RX ORDER — HYDROCORTISONE SODIUM SUCCINATE 100 MG/2ML
100 INJECTION INTRAMUSCULAR; INTRAVENOUS
OUTPATIENT
Start: 2025-01-08

## 2025-01-16 ENCOUNTER — TELEPHONE (OUTPATIENT)
Dept: ONCOLOGY | Age: 40
End: 2025-01-16

## 2025-01-16 ENCOUNTER — HOSPITAL ENCOUNTER (OUTPATIENT)
Dept: INFUSION THERAPY | Age: 40
Discharge: HOME | End: 2025-01-16
Payer: COMMERCIAL

## 2025-01-16 ENCOUNTER — OFFICE VISIT (OUTPATIENT)
Dept: ONCOLOGY | Age: 40
End: 2025-01-16
Payer: COMMERCIAL

## 2025-01-16 VITALS
HEART RATE: 80 BPM | SYSTOLIC BLOOD PRESSURE: 107 MMHG | BODY MASS INDEX: 26.16 KG/M2 | TEMPERATURE: 97.9 F | WEIGHT: 143 LBS | DIASTOLIC BLOOD PRESSURE: 67 MMHG | RESPIRATION RATE: 18 BRPM

## 2025-01-16 VITALS
TEMPERATURE: 97.9 F | WEIGHT: 143 LBS | HEIGHT: 62 IN | BODY MASS INDEX: 26.31 KG/M2 | DIASTOLIC BLOOD PRESSURE: 67 MMHG | RESPIRATION RATE: 18 BRPM | SYSTOLIC BLOOD PRESSURE: 107 MMHG | HEART RATE: 111 BPM

## 2025-01-16 DIAGNOSIS — C53.0 MALIGNANT NEOPLASM OF ENDOCERVIX (HCC): Primary | ICD-10-CM

## 2025-01-16 DIAGNOSIS — C77.5 METASTASIS TO ILIAC LYMPH NODE (HCC): ICD-10-CM

## 2025-01-16 LAB
ALBUMIN SERPL-MCNC: 3.5 G/DL (ref 3.5–5.2)
ALBUMIN/GLOB SERPL: 1.1 {RATIO} (ref 1–2.5)
ALP SERPL-CCNC: 61 U/L (ref 35–104)
ALT SERPL-CCNC: 13 U/L (ref 10–35)
ANION GAP SERPL CALCULATED.3IONS-SCNC: 10 MMOL/L (ref 9–16)
AST SERPL-CCNC: 22 U/L (ref 10–35)
BASOPHILS # BLD: 0.05 K/UL (ref 0–0.2)
BASOPHILS NFR BLD: 1 % (ref 0–2)
BILIRUB SERPL-MCNC: <0.2 MG/DL (ref 0–1.2)
BUN SERPL-MCNC: 4 MG/DL (ref 6–20)
BUN/CREAT SERPL: 7 (ref 9–20)
CALCIUM SERPL-MCNC: 8.7 MG/DL (ref 8.6–10.4)
CHLORIDE SERPL-SCNC: 109 MMOL/L (ref 98–107)
CO2 SERPL-SCNC: 22 MMOL/L (ref 20–31)
CREAT SERPL-MCNC: 0.6 MG/DL (ref 0.5–0.9)
EOSINOPHIL # BLD: 0.41 K/UL (ref 0–0.44)
EOSINOPHILS RELATIVE PERCENT: 6 % (ref 1–4)
ERYTHROCYTE [DISTWIDTH] IN BLOOD BY AUTOMATED COUNT: 14.8 % (ref 11.8–14.4)
GFR, ESTIMATED: >90 ML/MIN/1.73M2
GLUCOSE SERPL-MCNC: 93 MG/DL (ref 74–99)
HCT VFR BLD AUTO: 26.7 % (ref 36.3–47.1)
HGB BLD-MCNC: 8.5 G/DL (ref 11.9–15.1)
IMM GRANULOCYTES # BLD AUTO: <0.03 K/UL (ref 0–0.3)
IMM GRANULOCYTES NFR BLD: 0 %
LYMPHOCYTES NFR BLD: 2.33 K/UL (ref 1.1–3.7)
LYMPHOCYTES RELATIVE PERCENT: 33 % (ref 24–43)
MCH RBC QN AUTO: 29.4 PG (ref 25.2–33.5)
MCHC RBC AUTO-ENTMCNC: 31.8 G/DL (ref 28.4–34.8)
MCV RBC AUTO: 92.4 FL (ref 82.6–102.9)
MONOCYTES NFR BLD: 0.51 K/UL (ref 0.1–1.2)
MONOCYTES NFR BLD: 7 % (ref 3–12)
NEUTROPHILS NFR BLD: 53 % (ref 36–65)
NEUTS SEG NFR BLD: 3.8 K/UL (ref 1.5–8.1)
NRBC BLD-RTO: 0 PER 100 WBC
PLATELET # BLD AUTO: 477 K/UL (ref 138–453)
PMV BLD AUTO: 9.2 FL (ref 8.1–13.5)
POTASSIUM SERPL-SCNC: 4.4 MMOL/L (ref 3.7–5.3)
PROT SERPL-MCNC: 6.5 G/DL (ref 6.6–8.7)
RBC # BLD AUTO: 2.89 M/UL (ref 3.95–5.11)
SODIUM SERPL-SCNC: 141 MMOL/L (ref 136–145)
WBC OTHER # BLD: 7.1 K/UL (ref 3.5–11.3)

## 2025-01-16 PROCEDURE — 99214 OFFICE O/P EST MOD 30 MIN: CPT | Performed by: INTERNAL MEDICINE

## 2025-01-16 PROCEDURE — 6360000002 HC RX W HCPCS: Performed by: INTERNAL MEDICINE

## 2025-01-16 PROCEDURE — 96375 TX/PRO/DX INJ NEW DRUG ADDON: CPT

## 2025-01-16 PROCEDURE — 85025 COMPLETE CBC W/AUTO DIFF WBC: CPT

## 2025-01-16 PROCEDURE — 96417 CHEMO IV INFUS EACH ADDL SEQ: CPT

## 2025-01-16 PROCEDURE — 2580000003 HC RX 258: Performed by: INTERNAL MEDICINE

## 2025-01-16 PROCEDURE — 96413 CHEMO IV INFUSION 1 HR: CPT

## 2025-01-16 PROCEDURE — 80053 COMPREHEN METABOLIC PANEL: CPT

## 2025-01-16 PROCEDURE — 2500000003 HC RX 250 WO HCPCS: Performed by: INTERNAL MEDICINE

## 2025-01-16 PROCEDURE — 36415 COLL VENOUS BLD VENIPUNCTURE: CPT

## 2025-01-16 RX ORDER — PALONOSETRON 0.05 MG/ML
0.25 INJECTION, SOLUTION INTRAVENOUS ONCE
Status: COMPLETED | OUTPATIENT
Start: 2025-01-16 | End: 2025-01-16

## 2025-01-16 RX ORDER — SODIUM CHLORIDE 9 MG/ML
5-250 INJECTION, SOLUTION INTRAVENOUS PRN
Status: CANCELLED | OUTPATIENT
Start: 2025-01-16

## 2025-01-16 RX ORDER — FAMOTIDINE 10 MG/ML
20 INJECTION, SOLUTION INTRAVENOUS ONCE
Status: CANCELLED | OUTPATIENT
Start: 2025-01-16 | End: 2025-01-16

## 2025-01-16 RX ORDER — BUPRENORPHINE 7.5 UG/H
1 PATCH TRANSDERMAL WEEKLY
COMMUNITY

## 2025-01-16 RX ORDER — SODIUM CHLORIDE 0.9 % (FLUSH) 0.9 %
5-40 SYRINGE (ML) INJECTION PRN
Status: CANCELLED | OUTPATIENT
Start: 2025-01-16

## 2025-01-16 RX ORDER — SODIUM CHLORIDE 0.9 % (FLUSH) 0.9 %
5-40 SYRINGE (ML) INJECTION PRN
Status: DISCONTINUED | OUTPATIENT
Start: 2025-01-16 | End: 2025-01-17 | Stop reason: HOSPADM

## 2025-01-16 RX ORDER — SODIUM CHLORIDE 9 MG/ML
INJECTION, SOLUTION INTRAVENOUS CONTINUOUS
Status: CANCELLED | OUTPATIENT
Start: 2025-01-16

## 2025-01-16 RX ORDER — FAMOTIDINE 10 MG/ML
20 INJECTION, SOLUTION INTRAVENOUS
Status: CANCELLED | OUTPATIENT
Start: 2025-01-16

## 2025-01-16 RX ORDER — DIPHENHYDRAMINE HYDROCHLORIDE 50 MG/ML
50 INJECTION INTRAMUSCULAR; INTRAVENOUS ONCE
Status: CANCELLED | OUTPATIENT
Start: 2025-01-16 | End: 2025-01-16

## 2025-01-16 RX ORDER — DEXAMETHASONE SODIUM PHOSPHATE 10 MG/ML
10 INJECTION, SOLUTION INTRAMUSCULAR; INTRAVENOUS ONCE
Status: COMPLETED | OUTPATIENT
Start: 2025-01-16 | End: 2025-01-16

## 2025-01-16 RX ORDER — ALBUTEROL SULFATE 90 UG/1
4 INHALANT RESPIRATORY (INHALATION) PRN
Status: CANCELLED | OUTPATIENT
Start: 2025-01-16

## 2025-01-16 RX ORDER — HEPARIN SODIUM (PORCINE) LOCK FLUSH IV SOLN 100 UNIT/ML 100 UNIT/ML
500 SOLUTION INTRAVENOUS PRN
Status: CANCELLED | OUTPATIENT
Start: 2025-01-16

## 2025-01-16 RX ORDER — ALBUTEROL SULFATE 90 UG/1
4 INHALANT RESPIRATORY (INHALATION) PRN
OUTPATIENT
Start: 2025-01-16

## 2025-01-16 RX ORDER — HYDROCORTISONE SODIUM SUCCINATE 100 MG/2ML
100 INJECTION INTRAMUSCULAR; INTRAVENOUS
OUTPATIENT
Start: 2025-01-16

## 2025-01-16 RX ORDER — SODIUM CHLORIDE 9 MG/ML
INJECTION, SOLUTION INTRAVENOUS CONTINUOUS
OUTPATIENT
Start: 2025-01-16

## 2025-01-16 RX ORDER — EPINEPHRINE 1 MG/ML
0.3 INJECTION, SOLUTION, CONCENTRATE INTRAVENOUS PRN
OUTPATIENT
Start: 2025-01-16

## 2025-01-16 RX ORDER — ACETAMINOPHEN 325 MG/1
650 TABLET ORAL
Status: CANCELLED | OUTPATIENT
Start: 2025-01-16

## 2025-01-16 RX ORDER — HYDROCORTISONE SODIUM SUCCINATE 100 MG/2ML
100 INJECTION INTRAMUSCULAR; INTRAVENOUS
Status: CANCELLED | OUTPATIENT
Start: 2025-01-16

## 2025-01-16 RX ORDER — FAMOTIDINE 10 MG/ML
20 INJECTION, SOLUTION INTRAVENOUS
OUTPATIENT
Start: 2025-01-16

## 2025-01-16 RX ORDER — ACETAMINOPHEN 325 MG/1
650 TABLET ORAL
OUTPATIENT
Start: 2025-01-16

## 2025-01-16 RX ORDER — MEPERIDINE HYDROCHLORIDE 50 MG/ML
12.5 INJECTION INTRAMUSCULAR; INTRAVENOUS; SUBCUTANEOUS PRN
Status: CANCELLED | OUTPATIENT
Start: 2025-01-16

## 2025-01-16 RX ORDER — FAMOTIDINE 10 MG/ML
20 INJECTION, SOLUTION INTRAVENOUS ONCE
Status: COMPLETED | OUTPATIENT
Start: 2025-01-16 | End: 2025-01-16

## 2025-01-16 RX ORDER — ONDANSETRON 2 MG/ML
8 INJECTION INTRAMUSCULAR; INTRAVENOUS
Status: CANCELLED | OUTPATIENT
Start: 2025-01-16

## 2025-01-16 RX ORDER — DIPHENHYDRAMINE HYDROCHLORIDE 50 MG/ML
50 INJECTION INTRAMUSCULAR; INTRAVENOUS ONCE
Status: COMPLETED | OUTPATIENT
Start: 2025-01-16 | End: 2025-01-16

## 2025-01-16 RX ORDER — SODIUM CHLORIDE 9 MG/ML
5-250 INJECTION, SOLUTION INTRAVENOUS PRN
Status: DISCONTINUED | OUTPATIENT
Start: 2025-01-16 | End: 2025-01-17 | Stop reason: HOSPADM

## 2025-01-16 RX ORDER — PALONOSETRON 0.05 MG/ML
0.25 INJECTION, SOLUTION INTRAVENOUS ONCE
Status: CANCELLED | OUTPATIENT
Start: 2025-01-16 | End: 2025-01-16

## 2025-01-16 RX ORDER — HEPARIN 100 UNIT/ML
500 SYRINGE INTRAVENOUS PRN
OUTPATIENT
Start: 2025-01-16

## 2025-01-16 RX ORDER — DIPHENHYDRAMINE HYDROCHLORIDE 50 MG/ML
50 INJECTION INTRAMUSCULAR; INTRAVENOUS
Status: CANCELLED | OUTPATIENT
Start: 2025-01-16

## 2025-01-16 RX ORDER — ONDANSETRON 2 MG/ML
8 INJECTION INTRAMUSCULAR; INTRAVENOUS
OUTPATIENT
Start: 2025-01-16

## 2025-01-16 RX ORDER — HEPARIN 100 UNIT/ML
500 SYRINGE INTRAVENOUS PRN
Status: DISCONTINUED | OUTPATIENT
Start: 2025-01-16 | End: 2025-01-17 | Stop reason: HOSPADM

## 2025-01-16 RX ORDER — DIPHENHYDRAMINE HYDROCHLORIDE 50 MG/ML
50 INJECTION INTRAMUSCULAR; INTRAVENOUS
OUTPATIENT
Start: 2025-01-16

## 2025-01-16 RX ORDER — EPINEPHRINE 1 MG/ML
0.3 INJECTION, SOLUTION, CONCENTRATE INTRAVENOUS PRN
Status: CANCELLED | OUTPATIENT
Start: 2025-01-16

## 2025-01-16 RX ORDER — SODIUM CHLORIDE 9 MG/ML
25 INJECTION, SOLUTION INTRAVENOUS PRN
OUTPATIENT
Start: 2025-01-16

## 2025-01-16 RX ORDER — SODIUM CHLORIDE 0.9 % (FLUSH) 0.9 %
5-40 SYRINGE (ML) INJECTION PRN
OUTPATIENT
Start: 2025-01-16

## 2025-01-16 RX ADMIN — HEPARIN 500 UNITS: 100 SYRINGE at 12:21

## 2025-01-16 RX ADMIN — SODIUM CHLORIDE, PRESERVATIVE FREE 10 ML: 5 INJECTION INTRAVENOUS at 12:21

## 2025-01-16 RX ADMIN — CARBOPLATIN 235 MG: 10 INJECTION, SOLUTION INTRAVENOUS at 11:42

## 2025-01-16 RX ADMIN — SODIUM CHLORIDE, PRESERVATIVE FREE 10 ML: 5 INJECTION INTRAVENOUS at 12:20

## 2025-01-16 RX ADMIN — FAMOTIDINE 20 MG: 10 INJECTION, SOLUTION INTRAVENOUS at 09:44

## 2025-01-16 RX ADMIN — PALONOSETRON 0.25 MG: 0.05 INJECTION, SOLUTION INTRAVENOUS at 09:52

## 2025-01-16 RX ADMIN — SODIUM CHLORIDE 200 ML/HR: 0.9 INJECTION, SOLUTION INTRAVENOUS at 09:39

## 2025-01-16 RX ADMIN — PACLITAXEL 132 MG: 6 INJECTION, SOLUTION INTRAVENOUS at 10:30

## 2025-01-16 RX ADMIN — SODIUM CHLORIDE, PRESERVATIVE FREE 10 ML: 5 INJECTION INTRAVENOUS at 09:44

## 2025-01-16 RX ADMIN — DIPHENHYDRAMINE HYDROCHLORIDE 50 MG: 50 INJECTION INTRAMUSCULAR; INTRAVENOUS at 09:53

## 2025-01-16 RX ADMIN — SODIUM CHLORIDE, PRESERVATIVE FREE 10 ML: 5 INJECTION INTRAVENOUS at 08:14

## 2025-01-16 RX ADMIN — SODIUM CHLORIDE, PRESERVATIVE FREE 10 ML: 5 INJECTION INTRAVENOUS at 08:13

## 2025-01-16 RX ADMIN — DEXAMETHASONE SODIUM PHOSPHATE 10 MG: 10 INJECTION INTRAMUSCULAR; INTRAVENOUS at 09:48

## 2025-01-16 ASSESSMENT — PAIN DESCRIPTION - PAIN TYPE: TYPE: ACUTE PAIN;CHRONIC PAIN

## 2025-01-16 ASSESSMENT — PAIN - FUNCTIONAL ASSESSMENT: PAIN_FUNCTIONAL_ASSESSMENT: PREVENTS OR INTERFERES WITH MANY ACTIVE NOT PASSIVE ACTIVITIES

## 2025-01-16 ASSESSMENT — PAIN DESCRIPTION - ONSET: ONSET: ON-GOING

## 2025-01-16 ASSESSMENT — PAIN DESCRIPTION - DESCRIPTORS: DESCRIPTORS: SHARP;ACHING;DULL

## 2025-01-16 ASSESSMENT — PAIN DESCRIPTION - FREQUENCY: FREQUENCY: CONTINUOUS

## 2025-01-16 ASSESSMENT — PAIN DESCRIPTION - LOCATION: LOCATION: ABDOMEN;BACK

## 2025-01-16 ASSESSMENT — PAIN SCALES - GENERAL: PAINLEVEL_OUTOF10: 4

## 2025-01-16 ASSESSMENT — PAIN DESCRIPTION - ORIENTATION: ORIENTATION: LEFT;LOWER

## 2025-01-16 NOTE — PROGRESS NOTES
[employer] message.    Electronically signed by Kaylah Vail MA on 4/29/2024 at 8:38 AM       Diagnosis Date    Anxiety     Drug addict (HCC)     Hepatitis C     Liver disease        Past Surgical History:   Procedure Laterality Date    TUBAL LIGATION  2015       Allergies   Allergen Reactions    Septra [Sulfamethoxazole-Trimethoprim] Hives       Current Outpatient Medications   Medication Sig Dispense Refill    Buprenorphine HCl-Naloxone HCl (SUBOXONE SL) Place under the tongue      buprenorphine (BUPRENEX) 7.5 MCG/HR PTWK Place 1 patch onto the skin once a week.      ondansetron (ZOFRAN-ODT) 8 MG TBDP disintegrating tablet Take 1 tablet by mouth every 8 hours as needed for Nausea or Vomiting 60 tablet 1    lidocaine-prilocaine (EMLA) 2.5-2.5 % cream Apply topically to port site 60 minutes before access as needed. 30 g 1    HYDROcodone-acetaminophen (NORCO) 5-325 MG per tablet TAKE 1 TABLET BY MOUTH EVERY 4 HOURS AS NEEDED FOR PAIN FOR 14 DAYS      tiZANidine (ZANAFLEX) 4 MG tablet TAKE 1 TABLET BY MOUTH NIGHTLY AS NEEDED for muscle spasm 30 tablet 2    Multiple Vitamin (MULTI VITAMIN PO) Take by mouth daily       No current facility-administered medications for this visit.     Facility-Administered Medications Ordered in Other Visits   Medication Dose Route Frequency Provider Last Rate Last Admin    sodium chloride flush 0.9 % injection 5-40 mL  5-40 mL IntraVENous PRN Lucy Estevez MD   10 mL at 01/16/25 1221    heparin (PF) 100 UNIT/ML injection 500 Units  500 Units IntraCATHeter PRN Lucy Estevez MD   500 Units at 01/16/25 1221    0.9 % sodium chloride infusion  5-250 mL/hr IntraVENous PRLucy Osullivan MD   Stopped at 01/16/25 1030       Social History     Socioeconomic History    Marital status: Single     Spouse name: Not on file    Number of children: Not on file    Years of education: Not on file    Highest education level: Not on file   Occupational History    Not on file   Tobacco Use    Smoking status: Every Day     Current packs/day: 0.50     Average packs/day: 0.5 packs/day for 18.6

## 2025-01-16 NOTE — TELEPHONE ENCOUNTER
Name: Nola Llanes  : 1985  MRN: [id number]    Oncology Navigation Follow-Up Note    Contact Type:  Medical Oncology    Notes: Met with Nola in treatment room prior to physician's appointment.  Nola has good family support.  Plans to continue to work while on treatment. Will go to get nephrostomy tubes replaced 25.  Discussed hair loss and wig gonzalez. Pt is connected with Cancer Services in [place].  Nola is here for D1 C1 weekly Taxol Carbo. Pt would like treatment in [place] if possible.   Active listening and emotional support given.  Encouraged Nola to call with any questions or needs.      Electronically signed by Ghada Fragoso RN on 2025 at 9:51 AM

## 2025-01-16 NOTE — PROGRESS NOTES
Pt left before follow up appt was made. Checked with Dr Estevez who stated follow up could be in 2 weeks.  stated to do a thorough toxicity assessment and notify him of any concerns before administering Cycle 2. He stated patient should then follow up with him in 2 weeks. Alice Portillo MA scheduled follow up appt to be done virtually while pt in [place] for cycle 3 on 1/30/25 @0815. Called and left patient a message explaining all of the above and instructed her to call with any questions. Also called and alerted Becca in [place] who was to give the message to Cristal to call with any concerns after toxicity assessment before beginning cycle 2.

## 2025-01-16 NOTE — PROGRESS NOTES
0800 Chemotherapy teaching session conducted with patient.  See Chemotherapy Teaching Sheets (scanned into chart).  Discussed chemotherapy drugs Carboplatin and Taxol and their side effects.  Drug information provided in writing.  Explained procedure for contacting our office during and after office hours as described on Contact Information form that was also included in patient's packet.  Importance of contacting center or physician for changes in condition, uncontrolled side effects or troubling symptoms was stressed. Discussed home precautions after chemotherapy, neutropenia precautions (low WBC's) and thrombocytopenia precautions (low platelets).  Questions answered to patient's satisfaction.  Patient verbalized understanding of information discussed.  All information provided to patient in writing. Pt denies any questions or need for any assistance at this time. Resp easy. Pt also had Dr visit today. Dr. Estevez assessed pt and ordered to proceed with cycle 1.

## 2025-01-23 ENCOUNTER — HOSPITAL ENCOUNTER (OUTPATIENT)
Dept: INFUSION THERAPY | Age: 40
Discharge: HOME | End: 2025-01-23
Payer: COMMERCIAL

## 2025-01-23 VITALS
WEIGHT: 143.7 LBS | TEMPERATURE: 98.6 F | HEART RATE: 97 BPM | BODY MASS INDEX: 26.44 KG/M2 | DIASTOLIC BLOOD PRESSURE: 62 MMHG | HEIGHT: 62 IN | SYSTOLIC BLOOD PRESSURE: 94 MMHG | OXYGEN SATURATION: 96 % | RESPIRATION RATE: 16 BRPM

## 2025-01-23 DIAGNOSIS — C53.0 MALIGNANT NEOPLASM OF ENDOCERVIX (HCC): Primary | ICD-10-CM

## 2025-01-23 LAB
ALBUMIN SERPL-MCNC: 3.3 G/DL (ref 3.5–5.2)
ALP SERPL-CCNC: 57 U/L (ref 35–104)
ALT SERPL-CCNC: 10 U/L (ref 5–33)
ANION GAP SERPL CALCULATED.3IONS-SCNC: 12 MMOL/L (ref 9–17)
AST SERPL-CCNC: 14 U/L
BASOPHILS # BLD: 0.05 K/UL (ref 0–0.2)
BASOPHILS NFR BLD: 1 % (ref 0–2)
BILIRUB SERPL-MCNC: 0.3 MG/DL (ref 0.3–1.2)
BUN SERPL-MCNC: 3 MG/DL (ref 6–20)
BUN/CREAT SERPL: 6 (ref 9–20)
CALCIUM SERPL-MCNC: 8.5 MG/DL (ref 8.6–10.4)
CHLORIDE SERPL-SCNC: 99 MMOL/L (ref 98–107)
CO2 SERPL-SCNC: 22 MMOL/L (ref 20–31)
CREAT SERPL-MCNC: 0.5 MG/DL (ref 0.5–0.9)
EOSINOPHIL # BLD: 0.15 K/UL (ref 0–0.4)
EOSINOPHILS RELATIVE PERCENT: 3 % (ref 0–5)
ERYTHROCYTE [DISTWIDTH] IN BLOOD BY AUTOMATED COUNT: 15.5 % (ref 12.1–15.2)
GFR, ESTIMATED: >90 ML/MIN/1.73M2
GLUCOSE SERPL-MCNC: 95 MG/DL (ref 70–99)
HCT VFR BLD AUTO: 23.6 % (ref 36–46)
HGB BLD-MCNC: 7.5 G/DL (ref 12–16)
IMM GRANULOCYTES # BLD AUTO: 0.02 K/UL (ref 0–0.3)
IMM GRANULOCYTES NFR BLD: 0 % (ref 0–5)
LYMPHOCYTES NFR BLD: 1.47 K/UL (ref 1–4.8)
LYMPHOCYTES RELATIVE PERCENT: 24 % (ref 15–40)
MCH RBC QN AUTO: 29.2 PG (ref 26–34)
MCHC RBC AUTO-ENTMCNC: 31.8 G/DL (ref 31–37)
MCV RBC AUTO: 91.8 FL (ref 80–100)
MONOCYTES NFR BLD: 0.55 K/UL (ref 0–1)
MONOCYTES NFR BLD: 9 % (ref 4–8)
NEUTROPHILS NFR BLD: 63 % (ref 47–75)
NEUTS SEG NFR BLD: 3.83 K/UL (ref 2.5–7)
PLATELET # BLD AUTO: 293 K/UL (ref 140–450)
PMV BLD AUTO: 9.2 FL (ref 6–12)
POTASSIUM SERPL-SCNC: 3.9 MMOL/L (ref 3.7–5.3)
PROT SERPL-MCNC: 6.4 G/DL (ref 6.4–8.3)
RBC # BLD AUTO: 2.57 M/UL (ref 4–5.2)
SODIUM SERPL-SCNC: 133 MMOL/L (ref 135–144)
WBC OTHER # BLD: 6.1 K/UL (ref 3.5–11)

## 2025-01-23 PROCEDURE — 96375 TX/PRO/DX INJ NEW DRUG ADDON: CPT

## 2025-01-23 PROCEDURE — 96417 CHEMO IV INFUS EACH ADDL SEQ: CPT

## 2025-01-23 PROCEDURE — 85025 COMPLETE CBC W/AUTO DIFF WBC: CPT

## 2025-01-23 PROCEDURE — 6360000002 HC RX W HCPCS: Performed by: INTERNAL MEDICINE

## 2025-01-23 PROCEDURE — 80053 COMPREHEN METABOLIC PANEL: CPT

## 2025-01-23 PROCEDURE — 96413 CHEMO IV INFUSION 1 HR: CPT

## 2025-01-23 PROCEDURE — 2500000003 HC RX 250 WO HCPCS: Performed by: INTERNAL MEDICINE

## 2025-01-23 PROCEDURE — 2580000003 HC RX 258: Performed by: INTERNAL MEDICINE

## 2025-01-23 RX ORDER — SODIUM CHLORIDE 9 MG/ML
INJECTION, SOLUTION INTRAVENOUS CONTINUOUS
Status: CANCELLED | OUTPATIENT
Start: 2025-01-23

## 2025-01-23 RX ORDER — DIPHENHYDRAMINE HYDROCHLORIDE 50 MG/ML
50 INJECTION INTRAMUSCULAR; INTRAVENOUS ONCE
Status: CANCELLED | OUTPATIENT
Start: 2025-01-23 | End: 2025-01-23

## 2025-01-23 RX ORDER — SODIUM CHLORIDE 0.9 % (FLUSH) 0.9 %
5-40 SYRINGE (ML) INJECTION PRN
Status: DISCONTINUED | OUTPATIENT
Start: 2025-01-23 | End: 2025-01-24 | Stop reason: HOSPADM

## 2025-01-23 RX ORDER — FAMOTIDINE 10 MG/ML
20 INJECTION, SOLUTION INTRAVENOUS
Status: CANCELLED | OUTPATIENT
Start: 2025-01-23

## 2025-01-23 RX ORDER — ALBUTEROL SULFATE 90 UG/1
4 INHALANT RESPIRATORY (INHALATION) PRN
Status: CANCELLED | OUTPATIENT
Start: 2025-01-23

## 2025-01-23 RX ORDER — ACETAMINOPHEN 325 MG/1
650 TABLET ORAL
Status: CANCELLED | OUTPATIENT
Start: 2025-01-23

## 2025-01-23 RX ORDER — MEPERIDINE HYDROCHLORIDE 50 MG/ML
12.5 INJECTION INTRAMUSCULAR; INTRAVENOUS; SUBCUTANEOUS PRN
Status: CANCELLED | OUTPATIENT
Start: 2025-01-23

## 2025-01-23 RX ORDER — SODIUM CHLORIDE 9 MG/ML
5-250 INJECTION, SOLUTION INTRAVENOUS PRN
Status: DISCONTINUED | OUTPATIENT
Start: 2025-01-23 | End: 2025-01-24 | Stop reason: HOSPADM

## 2025-01-23 RX ORDER — DEXAMETHASONE SODIUM PHOSPHATE 10 MG/ML
10 INJECTION, SOLUTION INTRAMUSCULAR; INTRAVENOUS ONCE
Status: COMPLETED | OUTPATIENT
Start: 2025-01-23 | End: 2025-01-23

## 2025-01-23 RX ORDER — PALONOSETRON 0.05 MG/ML
0.25 INJECTION, SOLUTION INTRAVENOUS ONCE
Status: COMPLETED | OUTPATIENT
Start: 2025-01-23 | End: 2025-01-23

## 2025-01-23 RX ORDER — ONDANSETRON 2 MG/ML
8 INJECTION INTRAMUSCULAR; INTRAVENOUS
Status: CANCELLED | OUTPATIENT
Start: 2025-01-23

## 2025-01-23 RX ORDER — HEPARIN SODIUM (PORCINE) LOCK FLUSH IV SOLN 100 UNIT/ML 100 UNIT/ML
500 SOLUTION INTRAVENOUS PRN
Status: CANCELLED | OUTPATIENT
Start: 2025-01-23

## 2025-01-23 RX ORDER — SODIUM CHLORIDE 9 MG/ML
5-250 INJECTION, SOLUTION INTRAVENOUS PRN
Status: CANCELLED | OUTPATIENT
Start: 2025-01-23

## 2025-01-23 RX ORDER — HYDROCORTISONE SODIUM SUCCINATE 100 MG/2ML
100 INJECTION INTRAMUSCULAR; INTRAVENOUS
Status: CANCELLED | OUTPATIENT
Start: 2025-01-23

## 2025-01-23 RX ORDER — HEPARIN 100 UNIT/ML
500 SYRINGE INTRAVENOUS PRN
Status: DISCONTINUED | OUTPATIENT
Start: 2025-01-23 | End: 2025-01-24 | Stop reason: HOSPADM

## 2025-01-23 RX ORDER — FAMOTIDINE 10 MG/ML
20 INJECTION, SOLUTION INTRAVENOUS ONCE
Status: CANCELLED | OUTPATIENT
Start: 2025-01-23 | End: 2025-01-23

## 2025-01-23 RX ORDER — DIPHENHYDRAMINE HYDROCHLORIDE 50 MG/ML
50 INJECTION INTRAMUSCULAR; INTRAVENOUS
Status: CANCELLED | OUTPATIENT
Start: 2025-01-23

## 2025-01-23 RX ORDER — DIPHENHYDRAMINE HYDROCHLORIDE 50 MG/ML
50 INJECTION INTRAMUSCULAR; INTRAVENOUS ONCE
Status: COMPLETED | OUTPATIENT
Start: 2025-01-23 | End: 2025-01-23

## 2025-01-23 RX ORDER — PALONOSETRON 0.05 MG/ML
0.25 INJECTION, SOLUTION INTRAVENOUS ONCE
Status: CANCELLED | OUTPATIENT
Start: 2025-01-23 | End: 2025-01-23

## 2025-01-23 RX ORDER — SODIUM CHLORIDE 0.9 % (FLUSH) 0.9 %
5-40 SYRINGE (ML) INJECTION PRN
Status: CANCELLED | OUTPATIENT
Start: 2025-01-23

## 2025-01-23 RX ORDER — EPINEPHRINE 1 MG/ML
0.3 INJECTION, SOLUTION, CONCENTRATE INTRAVENOUS PRN
Status: CANCELLED | OUTPATIENT
Start: 2025-01-23

## 2025-01-23 RX ADMIN — HEPARIN 500 UNITS: 100 SYRINGE at 12:41

## 2025-01-23 RX ADMIN — DEXAMETHASONE SODIUM PHOSPHATE 10 MG: 10 INJECTION, SOLUTION INTRAMUSCULAR; INTRAVENOUS at 09:32

## 2025-01-23 RX ADMIN — SODIUM CHLORIDE 100 ML/HR: 9 INJECTION, SOLUTION INTRAVENOUS at 09:26

## 2025-01-23 RX ADMIN — PALONOSETRON 0.25 MG: 0.05 INJECTION, SOLUTION INTRAVENOUS at 09:31

## 2025-01-23 RX ADMIN — CARBOPLATIN 235 MG: 10 INJECTION, SOLUTION INTRAVENOUS at 11:59

## 2025-01-23 RX ADMIN — PACLITAXEL 132 MG: 6 INJECTION, SOLUTION INTRAVENOUS at 10:50

## 2025-01-23 RX ADMIN — FAMOTIDINE 20 MG: 10 INJECTION, SOLUTION INTRAVENOUS at 09:40

## 2025-01-23 RX ADMIN — DIPHENHYDRAMINE HYDROCHLORIDE 50 MG: 50 INJECTION INTRAMUSCULAR; INTRAVENOUS at 09:36

## 2025-01-23 RX ADMIN — SODIUM CHLORIDE, PRESERVATIVE FREE 20 ML: 5 INJECTION INTRAVENOUS at 12:40

## 2025-01-23 ASSESSMENT — PAIN DESCRIPTION - FREQUENCY: FREQUENCY: CONTINUOUS

## 2025-01-23 ASSESSMENT — PAIN DESCRIPTION - ORIENTATION: ORIENTATION: LEFT;LOWER

## 2025-01-23 ASSESSMENT — PAIN DESCRIPTION - PAIN TYPE: TYPE: ACUTE PAIN;CHRONIC PAIN

## 2025-01-23 ASSESSMENT — PAIN DESCRIPTION - DESCRIPTORS: DESCRIPTORS: ACHING

## 2025-01-23 ASSESSMENT — PAIN DESCRIPTION - LOCATION: LOCATION: BACK

## 2025-01-23 ASSESSMENT — PAIN DESCRIPTION - ONSET: ONSET: ON-GOING

## 2025-01-23 ASSESSMENT — PAIN SCALES - GENERAL: PAINLEVEL_OUTOF10: 4

## 2025-01-23 NOTE — PROGRESS NOTES
Spiritual Services Interventions  Room/bed info not found   1/23/2025  Sr Samantha Llanes  39 y.o. year old female    Encounter Summary  Encounter Overview/Reason: Initial Encounter  Service Provided For: Patient  Referral/Consult From: Larry  Last Encounter : 01/23/25  Complexity of Encounter: Moderate  Begin Time: 0955  End Time : 1010  Total Time Calculated: 15 min     Spiritual/Emotional needs  Type: Spiritual Support                    Assessment/Intervention/Outcome  Assessment: Coping  Intervention: Active listening, Sustaining Presence/Ministry of presence, Prayer (assurance of)/[place]  Outcome: Comfort

## 2025-01-23 NOTE — PROGRESS NOTES
Critical hemoglobin of 7.5 reported to LANDON Diaz at [facility]. Pt is asymptomatic at this time, but does report being on menses. Dr. Estevez give okay to treat. Pt agreeable to continue with scheduled treatment for today.

## 2025-01-27 ENCOUNTER — OFFICE VISIT (OUTPATIENT)
Dept: GYNECOLOGIC ONCOLOGY | Facility: CLINIC | Age: 40
End: 2025-01-27
Payer: COMMERCIAL

## 2025-01-27 VITALS
DIASTOLIC BLOOD PRESSURE: 69 MMHG | SYSTOLIC BLOOD PRESSURE: 104 MMHG | RESPIRATION RATE: 17 BRPM | TEMPERATURE: 97.5 F | HEART RATE: 95 BPM | BODY MASS INDEX: 26.97 KG/M2 | HEIGHT: 61 IN | OXYGEN SATURATION: 98 % | WEIGHT: 142.86 LBS

## 2025-01-27 DIAGNOSIS — C53.9 CERVICAL CANCER, FIGO STAGE IVA: Primary | ICD-10-CM

## 2025-01-27 PROCEDURE — 99214 OFFICE O/P EST MOD 30 MIN: CPT | Performed by: OBSTETRICS & GYNECOLOGY

## 2025-01-27 PROCEDURE — 99204 OFFICE O/P NEW MOD 45 MIN: CPT | Performed by: OBSTETRICS & GYNECOLOGY

## 2025-01-27 PROCEDURE — 3008F BODY MASS INDEX DOCD: CPT | Performed by: OBSTETRICS & GYNECOLOGY

## 2025-01-27 RX ORDER — BUPRENORPHINE HYDROCHLORIDE AND NALOXONE HYDROCHLORIDE DIHYDRATE 2; .5 MG/1; MG/1
TABLET SUBLINGUAL
COMMUNITY

## 2025-01-27 RX ORDER — TIZANIDINE HYDROCHLORIDE 4 MG/1
4 CAPSULE, GELATIN COATED ORAL 3 TIMES DAILY
COMMUNITY

## 2025-01-27 RX ORDER — BISMUTH SUBSALICYLATE 262 MG
1 TABLET,CHEWABLE ORAL DAILY
COMMUNITY

## 2025-01-27 RX ORDER — HYDROCODONE BITARTRATE AND ACETAMINOPHEN 7.5; 325 MG/15ML; MG/15ML
SOLUTION ORAL EVERY 6 HOURS PRN
COMMUNITY

## 2025-01-27 ASSESSMENT — COLUMBIA-SUICIDE SEVERITY RATING SCALE - C-SSRS
6. HAVE YOU EVER DONE ANYTHING, STARTED TO DO ANYTHING, OR PREPARED TO DO ANYTHING TO END YOUR LIFE?: NO
2. HAVE YOU ACTUALLY HAD ANY THOUGHTS OF KILLING YOURSELF?: NO
1. IN THE PAST MONTH, HAVE YOU WISHED YOU WERE DEAD OR WISHED YOU COULD GO TO SLEEP AND NOT WAKE UP?: NO

## 2025-01-27 ASSESSMENT — PAIN SCALES - GENERAL: PAINLEVEL_OUTOF10: 3

## 2025-01-27 ASSESSMENT — NCCN CANCER DISTRESS MANAGEMENT
NCCN PRACTICAL CONCERNS: 3
NCCN PHYSICAL CONCERNS: 2
NCCN PHYSICAL CONCERNS: 3
NCCN EMOTIONAL CONCERNS: 1
NCCN PHYSICAL CONCERNS: 1

## 2025-01-27 ASSESSMENT — ENCOUNTER SYMPTOMS
OCCASIONAL FEELINGS OF UNSTEADINESS: 0
DEPRESSION: 0
LOSS OF SENSATION IN FEET: 0

## 2025-01-27 ASSESSMENT — PATIENT HEALTH QUESTIONNAIRE - PHQ9
2. FEELING DOWN, DEPRESSED OR HOPELESS: NOT AT ALL
1. LITTLE INTEREST OR PLEASURE IN DOING THINGS: NOT AT ALL
SUM OF ALL RESPONSES TO PHQ9 QUESTIONS 1 AND 2: 0

## 2025-01-27 NOTE — PROGRESS NOTES
"Patient ID: Madalyn Manzano is a 39 y.o. female.  Referring Physician: No referring provider defined for this encounter.  Primary Care Provider: Tyler Hernandez, APRN-CNP      Subjective    Referred by Dr. Can (gyn onc)    40 yo with locally advanced cervical cancer    She states she started chemotherapy 2 weeks ago and is experiencing fatigue and nausea secondarily. Her plan is for 5 weekly  chemo treatments and 6 weeks of radiation treatments. Reports pelvic pain and intermittent vaginal bleeding/discharge onset 1.5 years and was treated for a PH imbalance by her PCP without benefit and then was referred to OB and PAP was abnormal.    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 5x     PMH  None    PSH  Bilateral nephrotomy tubes secondary to tumor/vesicovaginal fistula    SH  Lives with her children and grandchild and is employed as  for a truck part .  Denies illicit drug use. Reports a 1/2 pack day tobacco use and occasional alcohol use.    FH  Otherwise no family history of breast, uterine, ovarian or colon cancer.                 Review of Systems   All other systems reviewed and are negative.       Objective   BSA: 1.67 meters squared  /69   Pulse 95   Temp 36.4 °C (97.5 °F)   Resp 17   Ht (S) 1.555 m (5' 1.22\")   Wt 64.8 kg (142 lb 13.7 oz)   SpO2 98%   BMI 26.80 kg/m²      No family history on file.    Madalyn Manzano  reports that she has been smoking cigarettes. She has never used smokeless tobacco.  She  reports current alcohol use.  She  reports no history of drug use.    Physical Exam  Constitutional:       General: She is not in acute distress.     Appearance: Normal appearance.   Cardiovascular:      Rate and Rhythm: Normal rate and regular rhythm.   Pulmonary:      Effort: Pulmonary effort " is normal.      Breath sounds: Normal breath sounds.   Abdominal:      General: Bowel sounds are normal. There is no distension.   Genitourinary:     Comments: Upper vagina and cervix replaced with tumor     Musculoskeletal:      Right forearm: Normal.      Left forearm: Normal.      Right hand: Normal.      Left hand: Normal.      Right lower leg: Normal.      Left lower leg: Normal.      Right foot: Normal.      Left foot: Normal.         Performance Status:  Symptomatic; fully ambulatory    Assessment/Plan      Oncology History    No history exists.          Problem List Items Addressed This Visit             ICD-10-CM    Cervical cancer, FIGO stage KATELYNN - Primary C53.9       Treatment Plans       No treatment plans exist        -Reviewed natural history and prognosis of locally advanced cervical cancer.  Discussed treatment options in setting of Stage Katelynn disease.  - discussed current treatment including weekly induction carbo/taxol, followed by cis/EBRT, then brachytherapy is standard of care option.  - would recommend continue with bilateral nephrostomy tubes at this time.  Could eval for complex reconstruction in the future pending disease response.  - Continue treatments with primary gyn onc and rad onc, I will see her back as needed.      Scribe Attestation  By signing my name below, I, Debra Sparks, Jeovany   attest that this documentation has been prepared under the direction and in the presence of Gracie Quiroz MD.

## 2025-01-28 DIAGNOSIS — C53.0 MALIGNANT NEOPLASM OF ENDOCERVIX (HCC): Primary | ICD-10-CM

## 2025-01-28 RX ORDER — HYDROCORTISONE SODIUM SUCCINATE 100 MG/2ML
100 INJECTION INTRAMUSCULAR; INTRAVENOUS
OUTPATIENT
Start: 2025-02-03

## 2025-01-28 RX ORDER — ALBUTEROL SULFATE 90 UG/1
4 INHALANT RESPIRATORY (INHALATION) PRN
OUTPATIENT
Start: 2025-02-17

## 2025-01-28 RX ORDER — DIPHENHYDRAMINE HYDROCHLORIDE 50 MG/ML
50 INJECTION INTRAMUSCULAR; INTRAVENOUS ONCE
OUTPATIENT
Start: 2025-02-10 | End: 2025-02-10

## 2025-01-28 RX ORDER — EPINEPHRINE 1 MG/ML
0.3 INJECTION, SOLUTION, CONCENTRATE INTRAVENOUS PRN
OUTPATIENT
Start: 2025-02-10

## 2025-01-28 RX ORDER — SODIUM CHLORIDE 9 MG/ML
5-250 INJECTION, SOLUTION INTRAVENOUS PRN
OUTPATIENT
Start: 2025-02-03

## 2025-01-28 RX ORDER — HYDROCORTISONE SODIUM SUCCINATE 100 MG/2ML
100 INJECTION INTRAMUSCULAR; INTRAVENOUS
OUTPATIENT
Start: 2025-02-10

## 2025-01-28 RX ORDER — EPINEPHRINE 1 MG/ML
0.3 INJECTION, SOLUTION, CONCENTRATE INTRAVENOUS PRN
OUTPATIENT
Start: 2025-02-03

## 2025-01-28 RX ORDER — SODIUM CHLORIDE 9 MG/ML
5-250 INJECTION, SOLUTION INTRAVENOUS PRN
OUTPATIENT
Start: 2025-02-17

## 2025-01-28 RX ORDER — DIPHENHYDRAMINE HYDROCHLORIDE 50 MG/ML
50 INJECTION INTRAMUSCULAR; INTRAVENOUS ONCE
Status: CANCELLED | OUTPATIENT
Start: 2025-01-28 | End: 2025-01-28

## 2025-01-28 RX ORDER — FAMOTIDINE 10 MG/ML
20 INJECTION, SOLUTION INTRAVENOUS ONCE
OUTPATIENT
Start: 2025-02-17 | End: 2025-02-17

## 2025-01-28 RX ORDER — SODIUM CHLORIDE 0.9 % (FLUSH) 0.9 %
5-40 SYRINGE (ML) INJECTION PRN
Status: CANCELLED | OUTPATIENT
Start: 2025-01-28

## 2025-01-28 RX ORDER — ACETAMINOPHEN 325 MG/1
650 TABLET ORAL
Status: CANCELLED | OUTPATIENT
Start: 2025-01-28

## 2025-01-28 RX ORDER — PALONOSETRON 0.05 MG/ML
0.25 INJECTION, SOLUTION INTRAVENOUS ONCE
Status: CANCELLED | OUTPATIENT
Start: 2025-01-28 | End: 2025-01-28

## 2025-01-28 RX ORDER — PALONOSETRON 0.05 MG/ML
0.25 INJECTION, SOLUTION INTRAVENOUS ONCE
OUTPATIENT
Start: 2025-02-03 | End: 2025-02-03

## 2025-01-28 RX ORDER — PALONOSETRON 0.05 MG/ML
0.25 INJECTION, SOLUTION INTRAVENOUS ONCE
OUTPATIENT
Start: 2025-02-17 | End: 2025-02-17

## 2025-01-28 RX ORDER — SODIUM CHLORIDE 0.9 % (FLUSH) 0.9 %
5-40 SYRINGE (ML) INJECTION PRN
OUTPATIENT
Start: 2025-02-10

## 2025-01-28 RX ORDER — FAMOTIDINE 10 MG/ML
20 INJECTION, SOLUTION INTRAVENOUS
OUTPATIENT
Start: 2025-02-17

## 2025-01-28 RX ORDER — SODIUM CHLORIDE 9 MG/ML
INJECTION, SOLUTION INTRAVENOUS CONTINUOUS
OUTPATIENT
Start: 2025-02-17

## 2025-01-28 RX ORDER — SODIUM CHLORIDE 9 MG/ML
INJECTION, SOLUTION INTRAVENOUS CONTINUOUS
OUTPATIENT
Start: 2025-02-03

## 2025-01-28 RX ORDER — ACETAMINOPHEN 325 MG/1
650 TABLET ORAL
OUTPATIENT
Start: 2025-02-10

## 2025-01-28 RX ORDER — ALBUTEROL SULFATE 90 UG/1
4 INHALANT RESPIRATORY (INHALATION) PRN
OUTPATIENT
Start: 2025-02-10

## 2025-01-28 RX ORDER — FAMOTIDINE 10 MG/ML
20 INJECTION, SOLUTION INTRAVENOUS ONCE
OUTPATIENT
Start: 2025-02-10 | End: 2025-02-10

## 2025-01-28 RX ORDER — SODIUM CHLORIDE 9 MG/ML
INJECTION, SOLUTION INTRAVENOUS CONTINUOUS
Status: CANCELLED | OUTPATIENT
Start: 2025-01-28

## 2025-01-28 RX ORDER — FAMOTIDINE 10 MG/ML
20 INJECTION, SOLUTION INTRAVENOUS
Status: CANCELLED | OUTPATIENT
Start: 2025-01-28

## 2025-01-28 RX ORDER — HEPARIN SODIUM (PORCINE) LOCK FLUSH IV SOLN 100 UNIT/ML 100 UNIT/ML
500 SOLUTION INTRAVENOUS PRN
Status: CANCELLED | OUTPATIENT
Start: 2025-01-28

## 2025-01-28 RX ORDER — HEPARIN SODIUM (PORCINE) LOCK FLUSH IV SOLN 100 UNIT/ML 100 UNIT/ML
500 SOLUTION INTRAVENOUS PRN
OUTPATIENT
Start: 2025-02-03

## 2025-01-28 RX ORDER — HYDROCORTISONE SODIUM SUCCINATE 100 MG/2ML
100 INJECTION INTRAMUSCULAR; INTRAVENOUS
OUTPATIENT
Start: 2025-02-17

## 2025-01-28 RX ORDER — MEPERIDINE HYDROCHLORIDE 50 MG/ML
12.5 INJECTION INTRAMUSCULAR; INTRAVENOUS; SUBCUTANEOUS PRN
OUTPATIENT
Start: 2025-02-10

## 2025-01-28 RX ORDER — PALONOSETRON 0.05 MG/ML
0.25 INJECTION, SOLUTION INTRAVENOUS ONCE
OUTPATIENT
Start: 2025-02-10 | End: 2025-02-10

## 2025-01-28 RX ORDER — ACETAMINOPHEN 325 MG/1
650 TABLET ORAL
OUTPATIENT
Start: 2025-02-17

## 2025-01-28 RX ORDER — MEPERIDINE HYDROCHLORIDE 50 MG/ML
12.5 INJECTION INTRAMUSCULAR; INTRAVENOUS; SUBCUTANEOUS PRN
OUTPATIENT
Start: 2025-02-17

## 2025-01-28 RX ORDER — DIPHENHYDRAMINE HYDROCHLORIDE 50 MG/ML
50 INJECTION INTRAMUSCULAR; INTRAVENOUS
Status: CANCELLED | OUTPATIENT
Start: 2025-01-28

## 2025-01-28 RX ORDER — ONDANSETRON 2 MG/ML
8 INJECTION INTRAMUSCULAR; INTRAVENOUS
OUTPATIENT
Start: 2025-02-03

## 2025-01-28 RX ORDER — ONDANSETRON 2 MG/ML
8 INJECTION INTRAMUSCULAR; INTRAVENOUS
Status: CANCELLED | OUTPATIENT
Start: 2025-01-28

## 2025-01-28 RX ORDER — MEPERIDINE HYDROCHLORIDE 50 MG/ML
12.5 INJECTION INTRAMUSCULAR; INTRAVENOUS; SUBCUTANEOUS PRN
OUTPATIENT
Start: 2025-02-03

## 2025-01-28 RX ORDER — SODIUM CHLORIDE 0.9 % (FLUSH) 0.9 %
5-40 SYRINGE (ML) INJECTION PRN
OUTPATIENT
Start: 2025-02-03

## 2025-01-28 RX ORDER — EPINEPHRINE 1 MG/ML
0.3 INJECTION, SOLUTION, CONCENTRATE INTRAVENOUS PRN
Status: CANCELLED | OUTPATIENT
Start: 2025-01-28

## 2025-01-28 RX ORDER — HEPARIN SODIUM (PORCINE) LOCK FLUSH IV SOLN 100 UNIT/ML 100 UNIT/ML
500 SOLUTION INTRAVENOUS PRN
OUTPATIENT
Start: 2025-02-10

## 2025-01-28 RX ORDER — ACETAMINOPHEN 325 MG/1
650 TABLET ORAL
OUTPATIENT
Start: 2025-02-03

## 2025-01-28 RX ORDER — SODIUM CHLORIDE 0.9 % (FLUSH) 0.9 %
5-40 SYRINGE (ML) INJECTION PRN
OUTPATIENT
Start: 2025-02-17

## 2025-01-28 RX ORDER — FAMOTIDINE 10 MG/ML
20 INJECTION, SOLUTION INTRAVENOUS ONCE
OUTPATIENT
Start: 2025-02-03 | End: 2025-02-03

## 2025-01-28 RX ORDER — DIPHENHYDRAMINE HYDROCHLORIDE 50 MG/ML
50 INJECTION INTRAMUSCULAR; INTRAVENOUS
OUTPATIENT
Start: 2025-02-17

## 2025-01-28 RX ORDER — MEPERIDINE HYDROCHLORIDE 50 MG/ML
12.5 INJECTION INTRAMUSCULAR; INTRAVENOUS; SUBCUTANEOUS PRN
Status: CANCELLED | OUTPATIENT
Start: 2025-01-28

## 2025-01-28 RX ORDER — DIPHENHYDRAMINE HYDROCHLORIDE 50 MG/ML
50 INJECTION INTRAMUSCULAR; INTRAVENOUS
OUTPATIENT
Start: 2025-02-10

## 2025-01-28 RX ORDER — ALBUTEROL SULFATE 90 UG/1
4 INHALANT RESPIRATORY (INHALATION) PRN
Status: CANCELLED | OUTPATIENT
Start: 2025-01-28

## 2025-01-28 RX ORDER — FAMOTIDINE 10 MG/ML
20 INJECTION, SOLUTION INTRAVENOUS
OUTPATIENT
Start: 2025-02-10

## 2025-01-28 RX ORDER — HYDROCORTISONE SODIUM SUCCINATE 100 MG/2ML
100 INJECTION INTRAMUSCULAR; INTRAVENOUS
Status: CANCELLED | OUTPATIENT
Start: 2025-01-28

## 2025-01-28 RX ORDER — HEPARIN SODIUM (PORCINE) LOCK FLUSH IV SOLN 100 UNIT/ML 100 UNIT/ML
500 SOLUTION INTRAVENOUS PRN
OUTPATIENT
Start: 2025-02-17

## 2025-01-28 RX ORDER — EPINEPHRINE 1 MG/ML
0.3 INJECTION, SOLUTION, CONCENTRATE INTRAVENOUS PRN
OUTPATIENT
Start: 2025-02-17

## 2025-01-28 RX ORDER — SODIUM CHLORIDE 9 MG/ML
5-250 INJECTION, SOLUTION INTRAVENOUS PRN
OUTPATIENT
Start: 2025-02-10

## 2025-01-28 RX ORDER — ONDANSETRON 2 MG/ML
8 INJECTION INTRAMUSCULAR; INTRAVENOUS
OUTPATIENT
Start: 2025-02-17

## 2025-01-28 RX ORDER — SODIUM CHLORIDE 9 MG/ML
INJECTION, SOLUTION INTRAVENOUS CONTINUOUS
OUTPATIENT
Start: 2025-02-10

## 2025-01-28 RX ORDER — DIPHENHYDRAMINE HYDROCHLORIDE 50 MG/ML
50 INJECTION INTRAMUSCULAR; INTRAVENOUS ONCE
OUTPATIENT
Start: 2025-02-17 | End: 2025-02-17

## 2025-01-28 RX ORDER — FAMOTIDINE 10 MG/ML
20 INJECTION, SOLUTION INTRAVENOUS ONCE
Status: CANCELLED | OUTPATIENT
Start: 2025-01-28 | End: 2025-01-28

## 2025-01-28 RX ORDER — ALBUTEROL SULFATE 90 UG/1
4 INHALANT RESPIRATORY (INHALATION) PRN
OUTPATIENT
Start: 2025-02-03

## 2025-01-28 RX ORDER — SODIUM CHLORIDE 9 MG/ML
5-250 INJECTION, SOLUTION INTRAVENOUS PRN
Status: CANCELLED | OUTPATIENT
Start: 2025-01-28

## 2025-01-28 RX ORDER — DIPHENHYDRAMINE HYDROCHLORIDE 50 MG/ML
50 INJECTION INTRAMUSCULAR; INTRAVENOUS ONCE
OUTPATIENT
Start: 2025-02-03 | End: 2025-02-03

## 2025-01-28 RX ORDER — ONDANSETRON 2 MG/ML
8 INJECTION INTRAMUSCULAR; INTRAVENOUS
OUTPATIENT
Start: 2025-02-10

## 2025-01-28 RX ORDER — DIPHENHYDRAMINE HYDROCHLORIDE 50 MG/ML
50 INJECTION INTRAMUSCULAR; INTRAVENOUS
OUTPATIENT
Start: 2025-02-03

## 2025-01-28 RX ORDER — FAMOTIDINE 10 MG/ML
20 INJECTION, SOLUTION INTRAVENOUS
OUTPATIENT
Start: 2025-02-03

## 2025-01-29 ENCOUNTER — CLINICAL DOCUMENTATION (OUTPATIENT)
Facility: HOSPITAL | Age: 40
End: 2025-01-29

## 2025-01-30 ENCOUNTER — TELEMEDICINE (OUTPATIENT)
Dept: ONCOLOGY | Age: 40
End: 2025-01-30
Payer: COMMERCIAL

## 2025-01-30 ENCOUNTER — HOSPITAL ENCOUNTER (OUTPATIENT)
Dept: INFUSION THERAPY | Age: 40
Discharge: HOME | End: 2025-01-30
Payer: COMMERCIAL

## 2025-01-30 VITALS
HEIGHT: 62 IN | DIASTOLIC BLOOD PRESSURE: 69 MMHG | SYSTOLIC BLOOD PRESSURE: 98 MMHG | OXYGEN SATURATION: 95 % | WEIGHT: 143.9 LBS | HEART RATE: 83 BPM | TEMPERATURE: 98.6 F | RESPIRATION RATE: 16 BRPM | BODY MASS INDEX: 26.48 KG/M2

## 2025-01-30 DIAGNOSIS — C53.0 MALIGNANT NEOPLASM OF ENDOCERVIX (HCC): Primary | ICD-10-CM

## 2025-01-30 DIAGNOSIS — C77.5 METASTASIS TO ILIAC LYMPH NODE (HCC): ICD-10-CM

## 2025-01-30 DIAGNOSIS — D64.81 ANEMIA ASSOCIATED WITH CHEMOTHERAPY: ICD-10-CM

## 2025-01-30 DIAGNOSIS — T45.1X5A ANEMIA ASSOCIATED WITH CHEMOTHERAPY: ICD-10-CM

## 2025-01-30 DIAGNOSIS — T45.1X5A ADVERSE EFFECT OF CHEMOTHERAPY, INITIAL ENCOUNTER: ICD-10-CM

## 2025-01-30 DIAGNOSIS — N13.9 OBSTRUCTIVE UROPATHY: ICD-10-CM

## 2025-01-30 LAB
ALBUMIN SERPL-MCNC: 3.4 G/DL (ref 3.5–5.2)
ALP SERPL-CCNC: 53 U/L (ref 35–104)
ALT SERPL-CCNC: 11 U/L (ref 5–33)
ANION GAP SERPL CALCULATED.3IONS-SCNC: 9 MMOL/L (ref 9–17)
AST SERPL-CCNC: 19 U/L
BASOPHILS # BLD: 0.05 K/UL (ref 0–0.2)
BASOPHILS NFR BLD: 1 % (ref 0–2)
BILIRUB SERPL-MCNC: 0.2 MG/DL (ref 0.3–1.2)
BUN SERPL-MCNC: 5 MG/DL (ref 6–20)
BUN/CREAT SERPL: 10 (ref 9–20)
CALCIUM SERPL-MCNC: 8.9 MG/DL (ref 8.6–10.4)
CHLORIDE SERPL-SCNC: 102 MMOL/L (ref 98–107)
CO2 SERPL-SCNC: 25 MMOL/L (ref 20–31)
CREAT SERPL-MCNC: 0.5 MG/DL (ref 0.5–0.9)
EOSINOPHIL # BLD: 0.04 K/UL (ref 0–0.4)
EOSINOPHILS RELATIVE PERCENT: 1 % (ref 0–5)
ERYTHROCYTE [DISTWIDTH] IN BLOOD BY AUTOMATED COUNT: 16.9 % (ref 12.1–15.2)
GFR, ESTIMATED: >90 ML/MIN/1.73M2
GLUCOSE SERPL-MCNC: 93 MG/DL (ref 70–99)
HCT VFR BLD AUTO: 22.9 % (ref 36–46)
HGB BLD-MCNC: 7.3 G/DL (ref 12–16)
IMM GRANULOCYTES # BLD AUTO: 0.02 K/UL (ref 0–0.3)
IMM GRANULOCYTES NFR BLD: 1 % (ref 0–5)
LYMPHOCYTES NFR BLD: 1.46 K/UL (ref 1–4.8)
LYMPHOCYTES RELATIVE PERCENT: 34 % (ref 15–40)
MCH RBC QN AUTO: 29.3 PG (ref 26–34)
MCHC RBC AUTO-ENTMCNC: 31.9 G/DL (ref 31–37)
MCV RBC AUTO: 92 FL (ref 80–100)
MONOCYTES NFR BLD: 0.3 K/UL (ref 0–1)
MONOCYTES NFR BLD: 7 % (ref 4–8)
NEUTROPHILS NFR BLD: 57 % (ref 47–75)
NEUTS SEG NFR BLD: 2.49 K/UL (ref 2.5–7)
PLATELET # BLD AUTO: 302 K/UL (ref 140–450)
PMV BLD AUTO: 9.7 FL (ref 6–12)
POTASSIUM SERPL-SCNC: 4.2 MMOL/L (ref 3.7–5.3)
PROT SERPL-MCNC: 6.5 G/DL (ref 6.4–8.3)
RBC # BLD AUTO: 2.49 M/UL (ref 4–5.2)
SODIUM SERPL-SCNC: 136 MMOL/L (ref 135–144)
WBC OTHER # BLD: 4.4 K/UL (ref 3.5–11)

## 2025-01-30 PROCEDURE — 80053 COMPREHEN METABOLIC PANEL: CPT

## 2025-01-30 PROCEDURE — 2500000003 HC RX 250 WO HCPCS: Performed by: INTERNAL MEDICINE

## 2025-01-30 PROCEDURE — 96361 HYDRATE IV INFUSION ADD-ON: CPT

## 2025-01-30 PROCEDURE — 99214 OFFICE O/P EST MOD 30 MIN: CPT | Performed by: INTERNAL MEDICINE

## 2025-01-30 PROCEDURE — 6360000002 HC RX W HCPCS: Performed by: INTERNAL MEDICINE

## 2025-01-30 PROCEDURE — 96417 CHEMO IV INFUS EACH ADDL SEQ: CPT

## 2025-01-30 PROCEDURE — 96413 CHEMO IV INFUSION 1 HR: CPT

## 2025-01-30 PROCEDURE — 96375 TX/PRO/DX INJ NEW DRUG ADDON: CPT

## 2025-01-30 PROCEDURE — 2580000003 HC RX 258: Performed by: INTERNAL MEDICINE

## 2025-01-30 PROCEDURE — 99211 OFF/OP EST MAY X REQ PHY/QHP: CPT | Performed by: INTERNAL MEDICINE

## 2025-01-30 PROCEDURE — 85025 COMPLETE CBC W/AUTO DIFF WBC: CPT

## 2025-01-30 RX ORDER — HYDROCORTISONE SODIUM SUCCINATE 100 MG/2ML
100 INJECTION INTRAMUSCULAR; INTRAVENOUS
OUTPATIENT
Start: 2025-01-30

## 2025-01-30 RX ORDER — SODIUM CHLORIDE 9 MG/ML
5-250 INJECTION, SOLUTION INTRAVENOUS PRN
Status: DISCONTINUED | OUTPATIENT
Start: 2025-01-30 | End: 2025-01-31 | Stop reason: HOSPADM

## 2025-01-30 RX ORDER — EPINEPHRINE 1 MG/ML
0.3 INJECTION, SOLUTION INTRAMUSCULAR; SUBCUTANEOUS PRN
OUTPATIENT
Start: 2025-01-30

## 2025-01-30 RX ORDER — GABAPENTIN 100 MG/1
100 CAPSULE ORAL 3 TIMES DAILY
COMMUNITY
Start: 2024-12-18

## 2025-01-30 RX ORDER — SODIUM CHLORIDE 0.9 % (FLUSH) 0.9 %
5-40 SYRINGE (ML) INJECTION PRN
Status: DISCONTINUED | OUTPATIENT
Start: 2025-01-30 | End: 2025-01-31 | Stop reason: HOSPADM

## 2025-01-30 RX ORDER — SODIUM CHLORIDE 0.9 % (FLUSH) 0.9 %
5-40 SYRINGE (ML) INJECTION PRN
OUTPATIENT
Start: 2025-01-30

## 2025-01-30 RX ORDER — DEXAMETHASONE SODIUM PHOSPHATE 10 MG/ML
10 INJECTION, SOLUTION INTRAMUSCULAR; INTRAVENOUS ONCE
Status: COMPLETED | OUTPATIENT
Start: 2025-01-30 | End: 2025-01-30

## 2025-01-30 RX ORDER — SODIUM CHLORIDE 9 MG/ML
25 INJECTION, SOLUTION INTRAVENOUS PRN
OUTPATIENT
Start: 2025-01-30

## 2025-01-30 RX ORDER — PALONOSETRON 0.05 MG/ML
0.25 INJECTION, SOLUTION INTRAVENOUS ONCE
Status: COMPLETED | OUTPATIENT
Start: 2025-01-30 | End: 2025-01-30

## 2025-01-30 RX ORDER — ALBUTEROL SULFATE 90 UG/1
4 INHALANT RESPIRATORY (INHALATION) PRN
OUTPATIENT
Start: 2025-01-30

## 2025-01-30 RX ORDER — HEPARIN 100 UNIT/ML
500 SYRINGE INTRAVENOUS PRN
OUTPATIENT
Start: 2025-01-30

## 2025-01-30 RX ORDER — DIPHENHYDRAMINE HYDROCHLORIDE 50 MG/ML
50 INJECTION INTRAMUSCULAR; INTRAVENOUS
OUTPATIENT
Start: 2025-01-30

## 2025-01-30 RX ORDER — ONDANSETRON 2 MG/ML
8 INJECTION INTRAMUSCULAR; INTRAVENOUS
OUTPATIENT
Start: 2025-01-30

## 2025-01-30 RX ORDER — ACETAMINOPHEN 325 MG/1
650 TABLET ORAL
OUTPATIENT
Start: 2025-01-30

## 2025-01-30 RX ORDER — DIPHENHYDRAMINE HYDROCHLORIDE 50 MG/ML
50 INJECTION INTRAMUSCULAR; INTRAVENOUS ONCE
Status: COMPLETED | OUTPATIENT
Start: 2025-01-30 | End: 2025-01-30

## 2025-01-30 RX ORDER — HEPARIN 100 UNIT/ML
500 SYRINGE INTRAVENOUS PRN
Status: DISCONTINUED | OUTPATIENT
Start: 2025-01-30 | End: 2025-01-31 | Stop reason: HOSPADM

## 2025-01-30 RX ORDER — SODIUM CHLORIDE 9 MG/ML
INJECTION, SOLUTION INTRAVENOUS CONTINUOUS
OUTPATIENT
Start: 2025-01-30

## 2025-01-30 RX ADMIN — FAMOTIDINE 20 MG: 10 INJECTION, SOLUTION INTRAVENOUS at 09:45

## 2025-01-30 RX ADMIN — DIPHENHYDRAMINE HYDROCHLORIDE 50 MG: 50 INJECTION INTRAMUSCULAR; INTRAVENOUS at 09:36

## 2025-01-30 RX ADMIN — SODIUM CHLORIDE 100 ML/HR: 9 INJECTION, SOLUTION INTRAVENOUS at 09:30

## 2025-01-30 RX ADMIN — CARBOPLATIN 240 MG: 10 INJECTION, SOLUTION INTRAVENOUS at 11:50

## 2025-01-30 RX ADMIN — HEPARIN 500 UNITS: 100 SYRINGE at 12:41

## 2025-01-30 RX ADMIN — SODIUM CHLORIDE, PRESERVATIVE FREE 20 ML: 5 INJECTION INTRAVENOUS at 12:41

## 2025-01-30 RX ADMIN — PACLITAXEL 132 MG: 6 INJECTION, SOLUTION INTRAVENOUS at 10:40

## 2025-01-30 RX ADMIN — DEXAMETHASONE SODIUM PHOSPHATE 10 MG: 10 INJECTION, SOLUTION INTRAMUSCULAR; INTRAVENOUS at 09:32

## 2025-01-30 RX ADMIN — PALONOSETRON 0.25 MG: 0.05 INJECTION, SOLUTION INTRAVENOUS at 09:31

## 2025-01-30 ASSESSMENT — PAIN DESCRIPTION - LOCATION: LOCATION: PELVIS

## 2025-01-30 ASSESSMENT — PAIN DESCRIPTION - DESCRIPTORS: DESCRIPTORS: ACHING

## 2025-01-30 ASSESSMENT — PAIN DESCRIPTION - ONSET: ONSET: ON-GOING

## 2025-01-30 ASSESSMENT — PAIN DESCRIPTION - ORIENTATION: ORIENTATION: LEFT;LOWER

## 2025-01-30 ASSESSMENT — PAIN - FUNCTIONAL ASSESSMENT: PAIN_FUNCTIONAL_ASSESSMENT: ACTIVITIES ARE NOT PREVENTED

## 2025-01-30 ASSESSMENT — PAIN SCALES - GENERAL: PAINLEVEL_OUTOF10: 4

## 2025-01-30 ASSESSMENT — PAIN DESCRIPTION - FREQUENCY: FREQUENCY: CONTINUOUS

## 2025-01-30 NOTE — PROGRESS NOTES
was evaluated through a synchronous (real-time) audio-video encounter. The patient (or guardian if applicable) is aware that this is a billable service, which includes applicable co-pays. This Virtual Visit was conducted with patient's (and/or legal guardian's) consent. Patient identification was verified, and a caregiver was present when appropriate.   The patient was located at Facility (Appt Department): [facility]   [facility]        Total time spent for this encounter: Not billed by time          An electronic signature was used to authenticate this note.          Patient ID: Nola Llanes, 1985, [id number], 39 y.o.  Referred by :  No ref. provider found   Reason for consultation: cervical cancer,         Oncology history  Locally advanced cervical cancer with right external iliac lymphadenopathy      Oncology treatment  Induction chemotherapy CarboTaxol weekly for 6-week started January 16, 2025  Pending concurrent chemo RT after induction chemotherapy      HISTORY OF PRESENT ILLNESS:    Oncologic History:    Nola Llanes is a very pleasant 39 y.o. female.  Who was seen initially by GYN for NATHAN-3 and found to have advanced cervical cancer with fistula with obstructive uropathy status post bilateral nephrostomy tube placement at this time MRI PET/CT pending she was seen by GYN oncology and plan to proceed with concurrent chemo RT   Pathology as below      11/26/24 0000    Surgical Pathology Report Path Number: [id number]    -- Diagnosis --  CERVIX, BIOPSY:  - HIGH-GRADE SQUAMOUS INTRAEPITHELIAL LESION (NATHAN 3).  SEE COMMENT.   COMMENT: IMMUNOSTAIN FOR p16 (CONTROL APPROPRIATE) DEMONSTRATES A   POSITIVE STAINING PATTERN IN THE SQUAMOUS EPITHELIUM CONSISTENT WITH A   HIGH-GRADE SQUAMOUS INTRAEPITHELIAL LESION (NATHAN 3).  FOCAL INVOLVEMENT   OF ENDOCERVICAL EPITHELIUM BY THE HIGH-GRADE INTRAEPITHELIAL LESION IS   PRESENT.  MILD

## 2025-02-03 ENCOUNTER — SOCIAL WORK (OUTPATIENT)
Dept: ONCOLOGY | Age: 40
End: 2025-02-03

## 2025-02-03 NOTE — PROGRESS NOTES
called patient to check in on her status and to inquire about need for social work services. Patient expressed her need for financial assistance due to working  less due to treatment.  expressed that [facility] has a financial assistance program and told the patient the materials she would need to send to apply. Patient gave her email, [email or web address], for further communication and for receiving financial assistance materials.

## 2025-02-04 ENCOUNTER — SOCIAL WORK (OUTPATIENT)
Dept: ONCOLOGY | Age: 40
End: 2025-02-04

## 2025-02-04 ENCOUNTER — SOCIAL WORK (OUTPATIENT)
Dept: HEMATOLOGY/ONCOLOGY | Facility: CLINIC | Age: 40
End: 2025-02-04
Payer: COMMERCIAL

## 2025-02-04 NOTE — PROGRESS NOTES
Distress screening was completed at patient's new patient visit with Dr. Quiroz. Patient is a 39 year old presenting for a consultation of advanced cervical cancer (second opinion).  Patient identified a distress level of 4 with contributing factors as physical (pain, sleep, fatigue); emotional concerns (worry or anxiety); practical (work). LSW reached out to patient via phone today but she did not answer. Voice message was left with my contact information, will await return call to discuss concerns.  Patient anticipated to follow back up PRN and will return to oncologist that she follows with at [facility].

## 2025-02-04 NOTE — PROGRESS NOTES
called patient and left a message to inform her that her email bounced, and inquired if they could double check the email provided.  left phone number to touch base.

## 2025-02-06 ENCOUNTER — HOSPITAL ENCOUNTER (OUTPATIENT)
Dept: INFUSION THERAPY | Age: 40
Discharge: HOME | End: 2025-02-06
Payer: COMMERCIAL

## 2025-02-06 VITALS
SYSTOLIC BLOOD PRESSURE: 102 MMHG | OXYGEN SATURATION: 98 % | HEART RATE: 87 BPM | BODY MASS INDEX: 26.16 KG/M2 | RESPIRATION RATE: 15 BRPM | DIASTOLIC BLOOD PRESSURE: 52 MMHG | TEMPERATURE: 98.5 F | WEIGHT: 143 LBS

## 2025-02-06 DIAGNOSIS — C53.0 MALIGNANT NEOPLASM OF ENDOCERVIX (HCC): Primary | ICD-10-CM

## 2025-02-06 LAB
ALBUMIN SERPL-MCNC: 3.4 G/DL (ref 3.5–5.2)
ALP SERPL-CCNC: 56 U/L (ref 35–104)
ALT SERPL-CCNC: 13 U/L (ref 5–33)
ANION GAP SERPL CALCULATED.3IONS-SCNC: 14 MMOL/L (ref 9–17)
AST SERPL-CCNC: 18 U/L
BASOPHILS # BLD: 0.05 K/UL (ref 0–0.2)
BASOPHILS NFR BLD: 2 % (ref 0–2)
BILIRUB SERPL-MCNC: 0.2 MG/DL (ref 0.3–1.2)
BUN SERPL-MCNC: 5 MG/DL (ref 6–20)
BUN/CREAT SERPL: 8 (ref 9–20)
CALCIUM SERPL-MCNC: 8.9 MG/DL (ref 8.6–10.4)
CHLORIDE SERPL-SCNC: 100 MMOL/L (ref 98–107)
CO2 SERPL-SCNC: 21 MMOL/L (ref 20–31)
CREAT SERPL-MCNC: 0.6 MG/DL (ref 0.5–0.9)
EOSINOPHIL # BLD: 0.01 K/UL (ref 0–0.4)
EOSINOPHILS RELATIVE PERCENT: 0 % (ref 0–5)
ERYTHROCYTE [DISTWIDTH] IN BLOOD BY AUTOMATED COUNT: 18.7 % (ref 12.1–15.2)
GFR, ESTIMATED: >90 ML/MIN/1.73M2
GLUCOSE SERPL-MCNC: 120 MG/DL (ref 70–99)
HCT VFR BLD AUTO: 22.3 % (ref 36–46)
HGB BLD-MCNC: 7.1 G/DL (ref 12–16)
IMM GRANULOCYTES # BLD AUTO: 0 K/UL (ref 0–0.3)
IMM GRANULOCYTES NFR BLD: 0 % (ref 0–5)
LYMPHOCYTES NFR BLD: 1.55 K/UL (ref 1–4.8)
LYMPHOCYTES RELATIVE PERCENT: 45 % (ref 15–40)
MCH RBC QN AUTO: 30.1 PG (ref 26–34)
MCHC RBC AUTO-ENTMCNC: 31.8 G/DL (ref 31–37)
MCV RBC AUTO: 94.5 FL (ref 80–100)
MONOCYTES NFR BLD: 0.3 K/UL (ref 0–1)
MONOCYTES NFR BLD: 9 % (ref 4–8)
NEUTROPHILS NFR BLD: 44 % (ref 47–75)
NEUTS SEG NFR BLD: 1.52 K/UL (ref 2.5–7)
PLATELET # BLD AUTO: 245 K/UL (ref 140–450)
PMV BLD AUTO: 9.9 FL (ref 6–12)
POTASSIUM SERPL-SCNC: 4.2 MMOL/L (ref 3.7–5.3)
PROT SERPL-MCNC: 6.4 G/DL (ref 6.4–8.3)
RBC # BLD AUTO: 2.36 M/UL (ref 4–5.2)
SODIUM SERPL-SCNC: 135 MMOL/L (ref 135–144)
WBC OTHER # BLD: 3.4 K/UL (ref 3.5–11)

## 2025-02-06 PROCEDURE — 6360000002 HC RX W HCPCS: Performed by: INTERNAL MEDICINE

## 2025-02-06 PROCEDURE — 2580000003 HC RX 258: Performed by: INTERNAL MEDICINE

## 2025-02-06 PROCEDURE — 96375 TX/PRO/DX INJ NEW DRUG ADDON: CPT

## 2025-02-06 PROCEDURE — 2500000003 HC RX 250 WO HCPCS: Performed by: INTERNAL MEDICINE

## 2025-02-06 PROCEDURE — 96417 CHEMO IV INFUS EACH ADDL SEQ: CPT

## 2025-02-06 PROCEDURE — 85025 COMPLETE CBC W/AUTO DIFF WBC: CPT

## 2025-02-06 PROCEDURE — 96413 CHEMO IV INFUSION 1 HR: CPT

## 2025-02-06 PROCEDURE — 80053 COMPREHEN METABOLIC PANEL: CPT

## 2025-02-06 RX ORDER — SODIUM CHLORIDE 0.9 % (FLUSH) 0.9 %
5-40 SYRINGE (ML) INJECTION PRN
Status: DISCONTINUED | OUTPATIENT
Start: 2025-02-06 | End: 2025-02-07 | Stop reason: HOSPADM

## 2025-02-06 RX ORDER — PALONOSETRON 0.05 MG/ML
0.25 INJECTION, SOLUTION INTRAVENOUS ONCE
Status: COMPLETED | OUTPATIENT
Start: 2025-02-06 | End: 2025-02-06

## 2025-02-06 RX ORDER — DIPHENHYDRAMINE HYDROCHLORIDE 50 MG/ML
50 INJECTION INTRAMUSCULAR; INTRAVENOUS ONCE
Status: COMPLETED | OUTPATIENT
Start: 2025-02-06 | End: 2025-02-06

## 2025-02-06 RX ORDER — SODIUM CHLORIDE 9 MG/ML
5-250 INJECTION, SOLUTION INTRAVENOUS PRN
Status: DISCONTINUED | OUTPATIENT
Start: 2025-02-06 | End: 2025-02-07 | Stop reason: HOSPADM

## 2025-02-06 RX ORDER — DEXAMETHASONE SODIUM PHOSPHATE 10 MG/ML
10 INJECTION, SOLUTION INTRAMUSCULAR; INTRAVENOUS ONCE
Status: COMPLETED | OUTPATIENT
Start: 2025-02-06 | End: 2025-02-06

## 2025-02-06 RX ORDER — HEPARIN 100 UNIT/ML
500 SYRINGE INTRAVENOUS PRN
Status: DISCONTINUED | OUTPATIENT
Start: 2025-02-06 | End: 2025-02-07 | Stop reason: HOSPADM

## 2025-02-06 RX ADMIN — SODIUM CHLORIDE, PRESERVATIVE FREE 20 ML: 5 INJECTION INTRAVENOUS at 12:25

## 2025-02-06 RX ADMIN — CARBOPLATIN 235 MG: 10 INJECTION, SOLUTION INTRAVENOUS at 11:43

## 2025-02-06 RX ADMIN — FAMOTIDINE 20 MG: 10 INJECTION, SOLUTION INTRAVENOUS at 09:25

## 2025-02-06 RX ADMIN — PALONOSETRON HYDROCHLORIDE 0.25 MG: 0.25 INJECTION INTRAVENOUS at 09:14

## 2025-02-06 RX ADMIN — DIPHENHYDRAMINE HYDROCHLORIDE 50 MG: 50 INJECTION INTRAMUSCULAR; INTRAVENOUS at 09:19

## 2025-02-06 RX ADMIN — DEXAMETHASONE SODIUM PHOSPHATE 10 MG: 10 INJECTION, SOLUTION INTRAMUSCULAR; INTRAVENOUS at 09:22

## 2025-02-06 RX ADMIN — SODIUM CHLORIDE 100 ML/HR: 9 INJECTION, SOLUTION INTRAVENOUS at 08:44

## 2025-02-06 RX ADMIN — PACLITAXEL 132 MG: 6 INJECTION, SOLUTION INTRAVENOUS at 10:36

## 2025-02-06 RX ADMIN — HEPARIN 500 UNITS: 100 SYRINGE at 12:25

## 2025-02-06 NOTE — PROGRESS NOTES
Ambulates to room 201 for chemotherapy treatment. Admits to fatigue, which she states is the same as last week. Also reports pain to her left flank, which has improved with use of her pain medication.

## 2025-02-06 NOTE — PROGRESS NOTES
Spiritual Services Interventions  Room/bed info not found   2/6/2025  Sr Samantha Llanes  39 y.o. year old female    Encounter Summary  Encounter Overview/Reason: Initial Encounter  Service Provided For: Patient  Referral/Consult From: Larry  Last Encounter : 02/06/25  Complexity of Encounter: Moderate  Begin Time: 0910  End Time : 0925  Total Time Calculated: 15 min     Spiritual/Emotional needs  Type: Spiritual Support                    Assessment/Intervention/Outcome  Assessment: Coping  Intervention: Active listening, Sustaining Presence/Ministry of presence, Prayer (assurance of)/[place]  Outcome: Expressed Gratitude

## 2025-02-07 ENCOUNTER — HOSPITAL ENCOUNTER (OUTPATIENT)
Dept: RADIOLOGY | Facility: HOSPITAL | Age: 40
Discharge: HOME | End: 2025-02-07
Payer: COMMERCIAL

## 2025-02-07 VITALS
TEMPERATURE: 97.7 F | RESPIRATION RATE: 18 BRPM | SYSTOLIC BLOOD PRESSURE: 101 MMHG | WEIGHT: 143 LBS | OXYGEN SATURATION: 100 % | BODY MASS INDEX: 27 KG/M2 | DIASTOLIC BLOOD PRESSURE: 54 MMHG | HEIGHT: 61 IN | HEART RATE: 60 BPM

## 2025-02-07 DIAGNOSIS — N13.30 UNSPECIFIED HYDRONEPHROSIS: ICD-10-CM

## 2025-02-07 PROCEDURE — 2550000001 HC RX 255 CONTRASTS: Performed by: RADIOLOGY

## 2025-02-07 PROCEDURE — 7100000009 HC PHASE TWO TIME - INITIAL BASE CHARGE

## 2025-02-07 PROCEDURE — 2720000007 HC OR 272 NO HCPCS

## 2025-02-07 PROCEDURE — 7100000010 HC PHASE TWO TIME - EACH INCREMENTAL 1 MINUTE

## 2025-02-07 PROCEDURE — 2500000004 HC RX 250 GENERAL PHARMACY W/ HCPCS (ALT 636 FOR OP/ED): Performed by: RADIOLOGY

## 2025-02-07 PROCEDURE — 50435 EXCHANGE NEPHROSTOMY CATH: CPT | Mod: 59 | Performed by: RADIOLOGY

## 2025-02-07 PROCEDURE — C1729 CATH, DRAINAGE: HCPCS

## 2025-02-07 PROCEDURE — 99152 MOD SED SAME PHYS/QHP 5/>YRS: CPT

## 2025-02-07 RX ORDER — MIDAZOLAM HYDROCHLORIDE 1 MG/ML
INJECTION INTRAMUSCULAR; INTRAVENOUS
Status: COMPLETED | OUTPATIENT
Start: 2025-02-07 | End: 2025-02-07

## 2025-02-07 RX ORDER — FENTANYL CITRATE 50 UG/ML
INJECTION, SOLUTION INTRAMUSCULAR; INTRAVENOUS
Status: COMPLETED | OUTPATIENT
Start: 2025-02-07 | End: 2025-02-07

## 2025-02-07 RX ADMIN — DEXTROSE MONOHYDRATE 1 G: 50 INJECTION, SOLUTION INTRAVENOUS at 12:39

## 2025-02-07 RX ADMIN — MIDAZOLAM HYDROCHLORIDE 1 MG: 1 INJECTION, SOLUTION INTRAMUSCULAR; INTRAVENOUS at 12:43

## 2025-02-07 RX ADMIN — MIDAZOLAM HYDROCHLORIDE 1 MG: 1 INJECTION, SOLUTION INTRAMUSCULAR; INTRAVENOUS at 12:53

## 2025-02-07 RX ADMIN — FENTANYL CITRATE 50 MCG: 50 INJECTION, SOLUTION INTRAMUSCULAR; INTRAVENOUS at 12:42

## 2025-02-07 RX ADMIN — IOHEXOL 50 ML: 350 INJECTION, SOLUTION INTRAVENOUS at 13:02

## 2025-02-07 RX ADMIN — FENTANYL CITRATE 50 MCG: 50 INJECTION, SOLUTION INTRAMUSCULAR; INTRAVENOUS at 12:53

## 2025-02-07 ASSESSMENT — PAIN - FUNCTIONAL ASSESSMENT
PAIN_FUNCTIONAL_ASSESSMENT: 0-10

## 2025-02-07 ASSESSMENT — PAIN SCALES - GENERAL
PAINLEVEL_OUTOF10: 0 - NO PAIN

## 2025-02-07 NOTE — POST-PROCEDURE NOTE
Interventional Radiology Brief Postprocedure Note    Attending: Davi Baptiste MD    Assistant:   Staff Role   Lili Woodall RN Radiology Nurse   Davi Baptiste MD Radiologist   Madalyn Feldman Radiology Technologist   Amberly Dumont Radiology Technologist       Diagnosis:   1. Unspecified hydronephrosis  IR nephrostomy tube exchange    IR nephrostomy tube exchange          Description of procedure: Bilateral nephrostomy tube up size from 10F to 12F.    Timeout:  Yes    Procedure Area: Procedure Area     Anesthesia:   Conscious Sedation    Complications: None    Estimated Blood Loss: minimal    Medications (Filter: Administrations occurring from 1221 to 1259 on 02/07/25) As of 02/07/25 1259      ceFAZolin (Ancef) 1 g in dextrose 5% 100 mL IV (g) Total dose:  1 g      Date/Time Rate/Dose/Volume Action       02/07/25  1239 1 g Given               fentaNYL PF (Sublimaze) injection (mcg) Total dose:  100 mcg      Date/Time Rate/Dose/Volume Action       02/07/25  1242 50 mcg Given      1253 50 mcg Given               midazolam (Versed) injection (mg) Total dose:  2 mg      Date/Time Rate/Dose/Volume Action       02/07/25  1243 1 mg Given      1253 1 mg Given                   No specimens collected      See detailed result report with images in PACS.    The patient tolerated the procedure well without incident or complication and is in stable condition.

## 2025-02-07 NOTE — Clinical Note
Bilat 10Fr neph tubes removed and replaced with bilat 12Fr neph tubes, sutured in place. Urine returned. Dressings CDI. Total 2mg versed, 100mcg fentanyl given ivp; 1g Ancef given ivpb. VSS t/o procedure. Report to RPCU RN.

## 2025-02-07 NOTE — PRE-PROCEDURE NOTE
"Interventional Radiology Preprocedure Note    Indication for procedure: The encounter diagnosis was Unspecified hydronephrosis.    Relevant review of systems: NA    Relevant Labs:   No results found for: \"CREATININE\", \"EGFR\", \"PTT\", \"INR\", \"PROTIME\", \"PREGTESTUR\"    Planned Sedation/Anesthesia: Moderate    Airway assessment: normal    Directed physical examination:    Aox3.  Resting comfortably in bed.  B/l neph tubes in place without significant drainage.    Mallampati: I (soft palate, uvula, fauces, and tonsillar pillars visible)    ASA Score: ASA 2 - Patient with mild systemic disease with no functional limitations    Benefits, risks and alternatives of procedure and planned sedation have been discussed with the patient and/or their representative. All questions answered and they agree to proceed.   "

## 2025-02-10 ENCOUNTER — SOCIAL WORK (OUTPATIENT)
Dept: ONCOLOGY | Age: 40
End: 2025-02-10

## 2025-02-13 ENCOUNTER — HOSPITAL ENCOUNTER (OUTPATIENT)
Dept: INFUSION THERAPY | Age: 40
Discharge: HOME | End: 2025-02-13
Payer: COMMERCIAL

## 2025-02-13 VITALS
HEART RATE: 83 BPM | OXYGEN SATURATION: 92 % | BODY MASS INDEX: 25.79 KG/M2 | RESPIRATION RATE: 18 BRPM | TEMPERATURE: 98.8 F | DIASTOLIC BLOOD PRESSURE: 49 MMHG | SYSTOLIC BLOOD PRESSURE: 94 MMHG | WEIGHT: 141 LBS

## 2025-02-13 DIAGNOSIS — C53.0 MALIGNANT NEOPLASM OF ENDOCERVIX (HCC): Primary | ICD-10-CM

## 2025-02-13 LAB
ABO/RH: NORMAL
ABSOLUTE BANDS: 0.18 K/UL (ref 0–1)
ALBUMIN SERPL-MCNC: 3.3 G/DL (ref 3.5–5.2)
ALBUMIN/GLOB SERPL: 1.2 {RATIO} (ref 1–2.5)
ALP SERPL-CCNC: 51 U/L (ref 35–104)
ALT SERPL-CCNC: 10 U/L (ref 5–33)
ANION GAP SERPL CALCULATED.3IONS-SCNC: 9 MMOL/L (ref 9–17)
ANTIBODY SCREEN: NEGATIVE
AST SERPL-CCNC: 16 U/L
BANDS: 6 % (ref 0–10)
BASOPHILS # BLD: ABNORMAL K/UL (ref 0–0.2)
BASOPHILS NFR BLD: ABNORMAL % (ref 0–2)
BILIRUB SERPL-MCNC: 0.3 MG/DL (ref 0.3–1.2)
BLOOD BANK DISPENSE STATUS: NORMAL
BLOOD BANK SAMPLE EXPIRATION: NORMAL
BPU ID: NORMAL
BUN SERPL-MCNC: 4 MG/DL (ref 6–20)
CALCIUM SERPL-MCNC: 8.5 MG/DL (ref 8.6–10.4)
CHLORIDE SERPL-SCNC: 101 MMOL/L (ref 98–107)
CO2 SERPL-SCNC: 25 MMOL/L (ref 20–31)
COMPONENT: NORMAL
CREAT SERPL-MCNC: 0.6 MG/DL (ref 0.5–0.9)
CROSSMATCH RESULT: NORMAL
EOSINOPHIL # BLD: 0.03 K/UL (ref 0–0.4)
EOSINOPHILS RELATIVE PERCENT: 1 % (ref 0–5)
ERYTHROCYTE [DISTWIDTH] IN BLOOD BY AUTOMATED COUNT: 20.1 % (ref 12.1–15.2)
GFR, ESTIMATED: >90 ML/MIN/1.73M2
GLUCOSE SERPL-MCNC: 115 MG/DL (ref 70–99)
HCT VFR BLD AUTO: 21.1 % (ref 36–46)
HCT VFR BLD AUTO: 23.1 % (ref 36–46)
HGB BLD-MCNC: 6.8 G/DL (ref 12–16)
HGB BLD-MCNC: 7.5 G/DL (ref 12–16)
IMM GRANULOCYTES # BLD AUTO: ABNORMAL K/UL (ref 0–0.3)
IMM GRANULOCYTES NFR BLD: ABNORMAL %
LYMPHOCYTES NFR BLD: 0.81 K/UL (ref 1–4.8)
LYMPHOCYTES RELATIVE PERCENT: 27 % (ref 15–40)
MCH RBC QN AUTO: 30 PG (ref 26–34)
MCHC RBC AUTO-ENTMCNC: 32.2 G/DL (ref 31–37)
MCV RBC AUTO: 93 FL (ref 80–100)
MONOCYTES NFR BLD: 0.18 K/UL (ref 0–1)
MONOCYTES NFR BLD: 6 % (ref 4–8)
MORPHOLOGY: ABNORMAL
NEUTROPHILS NFR BLD: 60 % (ref 47–75)
NEUTS SEG NFR BLD: 1.8 K/UL (ref 2.5–7)
PLATELET # BLD AUTO: 95 K/UL (ref 140–450)
PMV BLD AUTO: 10.7 FL (ref 6–12)
POTASSIUM SERPL-SCNC: 4 MMOL/L (ref 3.7–5.3)
PROT SERPL-MCNC: 6 G/DL (ref 6.4–8.3)
RBC # BLD AUTO: 2.27 M/UL (ref 4–5.2)
SODIUM SERPL-SCNC: 135 MMOL/L (ref 135–144)
TRANSFUSION STATUS: NORMAL
UNIT DIVISION: 0
WBC OTHER # BLD: 3 K/UL (ref 3.5–11)

## 2025-02-13 PROCEDURE — 86850 RBC ANTIBODY SCREEN: CPT

## 2025-02-13 PROCEDURE — 85018 HEMOGLOBIN: CPT

## 2025-02-13 PROCEDURE — 85014 HEMATOCRIT: CPT

## 2025-02-13 PROCEDURE — 86900 BLOOD TYPING SEROLOGIC ABO: CPT

## 2025-02-13 PROCEDURE — 2500000003 HC RX 250 WO HCPCS: Performed by: INTERNAL MEDICINE

## 2025-02-13 PROCEDURE — 85025 COMPLETE CBC W/AUTO DIFF WBC: CPT

## 2025-02-13 PROCEDURE — 36430 TRANSFUSION BLD/BLD COMPNT: CPT

## 2025-02-13 PROCEDURE — 6360000002 HC RX W HCPCS: Performed by: INTERNAL MEDICINE

## 2025-02-13 PROCEDURE — 80053 COMPREHEN METABOLIC PANEL: CPT

## 2025-02-13 PROCEDURE — P9016 RBC LEUKOCYTES REDUCED: HCPCS

## 2025-02-13 PROCEDURE — 86901 BLOOD TYPING SEROLOGIC RH(D): CPT

## 2025-02-13 PROCEDURE — 86920 COMPATIBILITY TEST SPIN: CPT

## 2025-02-13 RX ORDER — EPINEPHRINE 1 MG/ML
0.3 INJECTION, SOLUTION INTRAMUSCULAR; SUBCUTANEOUS PRN
OUTPATIENT
Start: 2025-02-13

## 2025-02-13 RX ORDER — ACETAMINOPHEN 325 MG/1
650 TABLET ORAL
OUTPATIENT
Start: 2025-02-13

## 2025-02-13 RX ORDER — ALBUTEROL SULFATE 90 UG/1
4 INHALANT RESPIRATORY (INHALATION) PRN
OUTPATIENT
Start: 2025-02-13

## 2025-02-13 RX ORDER — HEPARIN 100 UNIT/ML
500 SYRINGE INTRAVENOUS PRN
OUTPATIENT
Start: 2025-02-13

## 2025-02-13 RX ORDER — SODIUM CHLORIDE 9 MG/ML
25 INJECTION, SOLUTION INTRAVENOUS PRN
OUTPATIENT
Start: 2025-02-13

## 2025-02-13 RX ORDER — SODIUM CHLORIDE 9 MG/ML
INJECTION, SOLUTION INTRAVENOUS CONTINUOUS
OUTPATIENT
Start: 2025-02-13

## 2025-02-13 RX ORDER — ONDANSETRON 2 MG/ML
8 INJECTION INTRAMUSCULAR; INTRAVENOUS
OUTPATIENT
Start: 2025-02-13

## 2025-02-13 RX ORDER — SODIUM CHLORIDE 0.9 % (FLUSH) 0.9 %
5-40 SYRINGE (ML) INJECTION PRN
OUTPATIENT
Start: 2025-02-13

## 2025-02-13 RX ORDER — HYDROCORTISONE SODIUM SUCCINATE 100 MG/2ML
100 INJECTION INTRAMUSCULAR; INTRAVENOUS
OUTPATIENT
Start: 2025-02-13

## 2025-02-13 RX ORDER — SODIUM CHLORIDE 0.9 % (FLUSH) 0.9 %
5-40 SYRINGE (ML) INJECTION PRN
Status: DISCONTINUED | OUTPATIENT
Start: 2025-02-13 | End: 2025-02-14 | Stop reason: HOSPADM

## 2025-02-13 RX ORDER — HEPARIN 100 UNIT/ML
500 SYRINGE INTRAVENOUS PRN
Status: DISCONTINUED | OUTPATIENT
Start: 2025-02-13 | End: 2025-02-14 | Stop reason: HOSPADM

## 2025-02-13 RX ORDER — SODIUM CHLORIDE 9 MG/ML
INJECTION, SOLUTION INTRAVENOUS PRN
Status: DISCONTINUED | OUTPATIENT
Start: 2025-02-13 | End: 2025-02-14 | Stop reason: HOSPADM

## 2025-02-13 RX ORDER — DIPHENHYDRAMINE HYDROCHLORIDE 50 MG/ML
50 INJECTION INTRAMUSCULAR; INTRAVENOUS
OUTPATIENT
Start: 2025-02-13

## 2025-02-13 RX ORDER — DULOXETIN HYDROCHLORIDE 30 MG/1
30 CAPSULE, DELAYED RELEASE ORAL DAILY
COMMUNITY
Start: 2025-02-05

## 2025-02-13 RX ADMIN — SODIUM CHLORIDE, PRESERVATIVE FREE 20 ML: 5 INJECTION INTRAVENOUS at 14:03

## 2025-02-13 RX ADMIN — HEPARIN 500 UNITS: 100 SYRINGE at 14:03

## 2025-02-13 NOTE — PROGRESS NOTES
0941 Dr. Zavala notified via [employer] regarding abnormal labs. Notified of hgb 6.8, WBC 3.0, and plt 95.    1033 Chemo to be held for one week and patient to receive one unit PRBC.     CHRISTIANO Noel BC contacted regarding blood transfusion and verbal order received. Physician blood consent signed. RN discusses blood consent with pt and pt is agreeable to receive needed blood.   
Meal tray provided for patient.   
[id number] Comprehensive

## 2025-02-18 DIAGNOSIS — C53.0 MALIGNANT NEOPLASM OF ENDOCERVIX (HCC): Primary | ICD-10-CM

## 2025-02-18 RX ORDER — FAMOTIDINE 10 MG/ML
20 INJECTION, SOLUTION INTRAVENOUS ONCE
Status: CANCELLED | OUTPATIENT
Start: 2025-02-20 | End: 2025-02-20

## 2025-02-18 RX ORDER — ACETAMINOPHEN 325 MG/1
650 TABLET ORAL
Status: CANCELLED | OUTPATIENT
Start: 2025-02-20

## 2025-02-18 RX ORDER — ONDANSETRON 2 MG/ML
8 INJECTION INTRAMUSCULAR; INTRAVENOUS
Status: CANCELLED | OUTPATIENT
Start: 2025-02-20

## 2025-02-18 RX ORDER — EPINEPHRINE 1 MG/ML
0.3 INJECTION, SOLUTION, CONCENTRATE INTRAVENOUS PRN
Status: CANCELLED | OUTPATIENT
Start: 2025-02-20

## 2025-02-18 RX ORDER — PALONOSETRON 0.05 MG/ML
0.25 INJECTION, SOLUTION INTRAVENOUS ONCE
Status: CANCELLED | OUTPATIENT
Start: 2025-02-20 | End: 2025-02-20

## 2025-02-18 RX ORDER — DIPHENHYDRAMINE HYDROCHLORIDE 50 MG/ML
50 INJECTION INTRAMUSCULAR; INTRAVENOUS
Status: CANCELLED | OUTPATIENT
Start: 2025-02-20

## 2025-02-18 RX ORDER — ALBUTEROL SULFATE 90 UG/1
4 INHALANT RESPIRATORY (INHALATION) PRN
Status: CANCELLED | OUTPATIENT
Start: 2025-02-20

## 2025-02-18 RX ORDER — HYDROCORTISONE SODIUM SUCCINATE 100 MG/2ML
100 INJECTION INTRAMUSCULAR; INTRAVENOUS
Status: CANCELLED | OUTPATIENT
Start: 2025-02-20

## 2025-02-18 RX ORDER — SODIUM CHLORIDE 9 MG/ML
5-250 INJECTION, SOLUTION INTRAVENOUS PRN
Status: CANCELLED | OUTPATIENT
Start: 2025-02-20

## 2025-02-18 RX ORDER — HEPARIN SODIUM (PORCINE) LOCK FLUSH IV SOLN 100 UNIT/ML 100 UNIT/ML
500 SOLUTION INTRAVENOUS PRN
Status: CANCELLED | OUTPATIENT
Start: 2025-02-20

## 2025-02-18 RX ORDER — SODIUM CHLORIDE 9 MG/ML
INJECTION, SOLUTION INTRAVENOUS CONTINUOUS
Status: CANCELLED | OUTPATIENT
Start: 2025-02-20

## 2025-02-18 RX ORDER — FAMOTIDINE 10 MG/ML
20 INJECTION, SOLUTION INTRAVENOUS
Status: CANCELLED | OUTPATIENT
Start: 2025-02-20

## 2025-02-18 RX ORDER — SODIUM CHLORIDE 0.9 % (FLUSH) 0.9 %
5-40 SYRINGE (ML) INJECTION PRN
Status: CANCELLED | OUTPATIENT
Start: 2025-02-20

## 2025-02-18 RX ORDER — MEPERIDINE HYDROCHLORIDE 50 MG/ML
12.5 INJECTION INTRAMUSCULAR; INTRAVENOUS; SUBCUTANEOUS PRN
Status: CANCELLED | OUTPATIENT
Start: 2025-02-20

## 2025-02-18 RX ORDER — DIPHENHYDRAMINE HYDROCHLORIDE 50 MG/ML
50 INJECTION INTRAMUSCULAR; INTRAVENOUS ONCE
Status: CANCELLED | OUTPATIENT
Start: 2025-02-20 | End: 2025-02-20

## 2025-02-20 ENCOUNTER — HOSPITAL ENCOUNTER (OUTPATIENT)
Dept: INFUSION THERAPY | Age: 40
Discharge: HOME | End: 2025-02-20
Payer: COMMERCIAL

## 2025-02-20 VITALS
TEMPERATURE: 98.7 F | BODY MASS INDEX: 26.5 KG/M2 | HEART RATE: 76 BPM | DIASTOLIC BLOOD PRESSURE: 72 MMHG | WEIGHT: 144 LBS | OXYGEN SATURATION: 99 % | SYSTOLIC BLOOD PRESSURE: 102 MMHG | RESPIRATION RATE: 15 BRPM | HEIGHT: 62 IN

## 2025-02-20 DIAGNOSIS — C53.0 MALIGNANT NEOPLASM OF ENDOCERVIX (HCC): Primary | ICD-10-CM

## 2025-02-20 LAB
ABSOLUTE BANDS: 0.06 K/UL (ref 0–1)
ALBUMIN SERPL-MCNC: 3.2 G/DL (ref 3.5–5.2)
ALBUMIN/GLOB SERPL: 1.2 {RATIO} (ref 1–2.5)
ALP SERPL-CCNC: 60 U/L (ref 35–104)
ALT SERPL-CCNC: 10 U/L (ref 5–33)
ANION GAP SERPL CALCULATED.3IONS-SCNC: 10 MMOL/L (ref 9–17)
AST SERPL-CCNC: 18 U/L
BANDS: 2 % (ref 0–10)
BASOPHILS # BLD: 0.03 K/UL (ref 0–0.2)
BASOPHILS NFR BLD: 1 % (ref 0–2)
BILIRUB SERPL-MCNC: <0.2 MG/DL (ref 0.3–1.2)
BUN SERPL-MCNC: 4 MG/DL (ref 6–20)
CALCIUM SERPL-MCNC: 8.4 MG/DL (ref 8.6–10.4)
CHLORIDE SERPL-SCNC: 103 MMOL/L (ref 98–107)
CO2 SERPL-SCNC: 24 MMOL/L (ref 20–31)
CREAT SERPL-MCNC: 0.6 MG/DL (ref 0.5–0.9)
EOSINOPHIL # BLD: 0.03 K/UL (ref 0–0.4)
EOSINOPHILS RELATIVE PERCENT: 1 % (ref 0–5)
ERYTHROCYTE [DISTWIDTH] IN BLOOD BY AUTOMATED COUNT: 22.8 % (ref 12.1–15.2)
GFR, ESTIMATED: >90 ML/MIN/1.73M2
GLUCOSE SERPL-MCNC: 97 MG/DL (ref 70–99)
HCT VFR BLD AUTO: 26.3 % (ref 36–46)
HGB BLD-MCNC: 8.4 G/DL (ref 12–16)
IMM GRANULOCYTES # BLD AUTO: ABNORMAL K/UL (ref 0–0.3)
IMM GRANULOCYTES NFR BLD: ABNORMAL %
LYMPHOCYTES NFR BLD: 1.42 K/UL (ref 1–4.8)
LYMPHOCYTES RELATIVE PERCENT: 49 % (ref 15–40)
MCH RBC QN AUTO: 30.5 PG (ref 26–34)
MCHC RBC AUTO-ENTMCNC: 31.9 G/DL (ref 31–37)
MCV RBC AUTO: 95.6 FL (ref 80–100)
MONOCYTES NFR BLD: 0.29 K/UL (ref 0–1)
MONOCYTES NFR BLD: 10 % (ref 4–8)
MORPHOLOGY: ABNORMAL
MORPHOLOGY: ABNORMAL
NEUTROPHILS NFR BLD: 37 % (ref 47–75)
NEUTS SEG NFR BLD: 1.07 K/UL (ref 2.5–7)
PLATELET # BLD AUTO: 107 K/UL (ref 140–450)
PMV BLD AUTO: 10.3 FL (ref 6–12)
POTASSIUM SERPL-SCNC: 4.1 MMOL/L (ref 3.7–5.3)
PROT SERPL-MCNC: 5.9 G/DL (ref 6.4–8.3)
RBC # BLD AUTO: 2.75 M/UL (ref 4–5.2)
SODIUM SERPL-SCNC: 137 MMOL/L (ref 135–144)
WBC OTHER # BLD: 2.9 K/UL (ref 3.5–11)

## 2025-02-20 PROCEDURE — 6360000002 HC RX W HCPCS: Performed by: INTERNAL MEDICINE

## 2025-02-20 PROCEDURE — 80053 COMPREHEN METABOLIC PANEL: CPT

## 2025-02-20 PROCEDURE — 2500000003 HC RX 250 WO HCPCS: Performed by: INTERNAL MEDICINE

## 2025-02-20 PROCEDURE — 2580000003 HC RX 258: Performed by: INTERNAL MEDICINE

## 2025-02-20 PROCEDURE — 96415 CHEMO IV INFUSION ADDL HR: CPT

## 2025-02-20 PROCEDURE — 96409 CHEMO IV PUSH SNGL DRUG: CPT

## 2025-02-20 PROCEDURE — 85025 COMPLETE CBC W/AUTO DIFF WBC: CPT

## 2025-02-20 PROCEDURE — 96374 THER/PROPH/DIAG INJ IV PUSH: CPT

## 2025-02-20 PROCEDURE — 96375 TX/PRO/DX INJ NEW DRUG ADDON: CPT

## 2025-02-20 PROCEDURE — 96411 CHEMO IV PUSH ADDL DRUG: CPT

## 2025-02-20 PROCEDURE — 96413 CHEMO IV INFUSION 1 HR: CPT

## 2025-02-20 RX ORDER — SODIUM CHLORIDE 0.9 % (FLUSH) 0.9 %
5-40 SYRINGE (ML) INJECTION PRN
Status: DISCONTINUED | OUTPATIENT
Start: 2025-02-20 | End: 2025-02-21 | Stop reason: HOSPADM

## 2025-02-20 RX ORDER — DIPHENHYDRAMINE HYDROCHLORIDE 50 MG/ML
50 INJECTION INTRAMUSCULAR; INTRAVENOUS ONCE
Status: COMPLETED | OUTPATIENT
Start: 2025-02-20 | End: 2025-02-20

## 2025-02-20 RX ORDER — PALONOSETRON 0.05 MG/ML
0.25 INJECTION, SOLUTION INTRAVENOUS ONCE
Status: COMPLETED | OUTPATIENT
Start: 2025-02-20 | End: 2025-02-20

## 2025-02-20 RX ORDER — DEXAMETHASONE SODIUM PHOSPHATE 10 MG/ML
10 INJECTION, SOLUTION INTRAMUSCULAR; INTRAVENOUS ONCE
Status: COMPLETED | OUTPATIENT
Start: 2025-02-20 | End: 2025-02-20

## 2025-02-20 RX ORDER — SODIUM CHLORIDE 9 MG/ML
5-250 INJECTION, SOLUTION INTRAVENOUS PRN
Status: DISCONTINUED | OUTPATIENT
Start: 2025-02-20 | End: 2025-02-21 | Stop reason: HOSPADM

## 2025-02-20 RX ORDER — HEPARIN 100 UNIT/ML
500 SYRINGE INTRAVENOUS PRN
Status: DISCONTINUED | OUTPATIENT
Start: 2025-02-20 | End: 2025-02-21 | Stop reason: HOSPADM

## 2025-02-20 RX ADMIN — PACLITAXEL 132 MG: 6 INJECTION, SOLUTION INTRAVENOUS at 11:06

## 2025-02-20 RX ADMIN — SODIUM CHLORIDE 100 ML/HR: 9 INJECTION, SOLUTION INTRAVENOUS at 09:34

## 2025-02-20 RX ADMIN — DIPHENHYDRAMINE HYDROCHLORIDE 50 MG: 50 INJECTION INTRAMUSCULAR; INTRAVENOUS at 09:40

## 2025-02-20 RX ADMIN — FAMOTIDINE 20 MG: 10 INJECTION, SOLUTION INTRAVENOUS at 09:37

## 2025-02-20 RX ADMIN — HEPARIN 500 UNITS: 100 SYRINGE at 13:05

## 2025-02-20 RX ADMIN — DEXAMETHASONE SODIUM PHOSPHATE 10 MG: 10 INJECTION, SOLUTION INTRAMUSCULAR; INTRAVENOUS at 09:42

## 2025-02-20 RX ADMIN — PALONOSETRON HYDROCHLORIDE 0.25 MG: 0.25 INJECTION INTRAVENOUS at 09:44

## 2025-02-20 RX ADMIN — SODIUM CHLORIDE, PRESERVATIVE FREE 20 ML: 5 INJECTION INTRAVENOUS at 13:04

## 2025-02-20 RX ADMIN — CARBOPLATIN 235 MG: 10 INJECTION, SOLUTION INTRAVENOUS at 12:18

## 2025-02-20 NOTE — PROGRESS NOTES
Spiritual Services Interventions  Room/bed info not found   2/20/2025  Sr Samantha Llanes  39 y.o. year old female    Encounter Summary  Encounter Overview/Reason: Initial Encounter  Service Provided For: Patient  Referral/Consult From: Larry  Last Encounter : 02/20/25  Complexity of Encounter: Low  Begin Time: 0805  End Time : 0815  Total Time Calculated: 10 min     Spiritual/Emotional needs  Type: Spiritual Support                    Assessment/Intervention/Outcome  Assessment: Calm  Intervention: Active listening, Prayer (assurance of)/[place]  Outcome: Expressed Gratitude

## 2025-02-24 ENCOUNTER — TELEMEDICINE (OUTPATIENT)
Dept: ONCOLOGY | Age: 40
End: 2025-02-24
Payer: COMMERCIAL

## 2025-02-24 DIAGNOSIS — N13.9 OBSTRUCTIVE UROPATHY: ICD-10-CM

## 2025-02-24 DIAGNOSIS — T45.1X5D ADVERSE EFFECT OF CHEMOTHERAPY, SUBSEQUENT ENCOUNTER: ICD-10-CM

## 2025-02-24 DIAGNOSIS — D75.89 MYELOSUPPRESSION AFTER CHEMOTHERAPY: ICD-10-CM

## 2025-02-24 DIAGNOSIS — D64.81 ANEMIA ASSOCIATED WITH CHEMOTHERAPY: ICD-10-CM

## 2025-02-24 DIAGNOSIS — C77.5 METASTASIS TO ILIAC LYMPH NODE (HCC): ICD-10-CM

## 2025-02-24 DIAGNOSIS — T45.1X5A ANEMIA ASSOCIATED WITH CHEMOTHERAPY: ICD-10-CM

## 2025-02-24 DIAGNOSIS — C53.0 MALIGNANT NEOPLASM OF ENDOCERVIX (HCC): Primary | ICD-10-CM

## 2025-02-24 DIAGNOSIS — D69.6 THROMBOCYTOPENIA: ICD-10-CM

## 2025-02-24 DIAGNOSIS — T45.1X5A MYELOSUPPRESSION AFTER CHEMOTHERAPY: ICD-10-CM

## 2025-02-24 PROCEDURE — 99215 OFFICE O/P EST HI 40 MIN: CPT | Performed by: INTERNAL MEDICINE

## 2025-02-24 NOTE — PROGRESS NOTES
of local spread   of the patient's cervical carcinoma into the region of the bladder trigone,   though a definite mass is not identified.    Possible small uterine fibroid.       ASSESSMENT:       Diagnosis Orders   1. Malignant neoplasm of endocervix (HCC)        2. Obstructive uropathy        3. 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 Adverse effect of chemotherapy, subsequent encounter        5. Anemia associated with chemotherapy        6. Thrombocytopenia        7. Myelosuppression after chemotherapy                       PLAN:     I reviewed labs, imaging studies, related electronic medical records including outside records and discussed the diagnosis, prognosis and treatment recommendations   I reviewed the surgical pathology results, discuss goals of care and NCCN guidelines with patient and family    39-year-old woman found to have 14 cm cervical mass with biopsy NATHAN-3 and the mass adherent to the bilateral pelvic walls with vaginal fistula and significant hydronephrosis status post bilateral nephrostomy tube and CT abdomen pelvis concern for pelvic and retroperitoneal lymphadenopathy clinically at least stage IIIc versus stage [place] at this time PET/CT and MRI still pending;patient was seen by GYN oncology and radiation oncology and recommended upfront neoadjuvant chemotherapy followed by concurrent chemo RT  Long discussion with the patient's about natural history of cervical cancer in general and in her case in particular with locally advanced as there is survival benefit with the incorporation of i???ctio? chemotherapy prior to chemoradiation( INTERLACE trial ) however it is unclear whether this benefit would persist if immunotherapy (Keytruda )would be given with cisplatin in immunotherapy eligible patients(regardless of lymph elkin,tumor that extends to the pelvic wall, involves the lower third of the vagina, causes hydronephrosis or nonfunctioning kidney, invades the bladder or rectum, or

## 2025-02-27 ENCOUNTER — HOSPITAL ENCOUNTER (OUTPATIENT)
Dept: INFUSION THERAPY | Age: 40
Discharge: HOME | End: 2025-02-27
Payer: COMMERCIAL

## 2025-02-27 VITALS
DIASTOLIC BLOOD PRESSURE: 73 MMHG | RESPIRATION RATE: 16 BRPM | OXYGEN SATURATION: 97 % | HEART RATE: 93 BPM | BODY MASS INDEX: 26.43 KG/M2 | SYSTOLIC BLOOD PRESSURE: 116 MMHG | TEMPERATURE: 98.8 F | WEIGHT: 142.2 LBS

## 2025-02-27 DIAGNOSIS — C53.0 MALIGNANT NEOPLASM OF ENDOCERVIX (HCC): Primary | ICD-10-CM

## 2025-02-27 LAB
ABSOLUTE BANDS: 0.04 K/UL (ref 0–1)
ALBUMIN SERPL-MCNC: 3.4 G/DL (ref 3.5–5.2)
ALBUMIN/GLOB SERPL: 1.2 {RATIO} (ref 1–2.5)
ALP SERPL-CCNC: 58 U/L (ref 35–104)
ALT SERPL-CCNC: 10 U/L (ref 5–33)
ANION GAP SERPL CALCULATED.3IONS-SCNC: 8 MMOL/L (ref 9–17)
AST SERPL-CCNC: 20 U/L
BANDS: 1 % (ref 0–10)
BASOPHILS # BLD: ABNORMAL K/UL (ref 0–0.2)
BASOPHILS NFR BLD: ABNORMAL % (ref 0–2)
BILIRUB SERPL-MCNC: <0.2 MG/DL (ref 0.3–1.2)
BUN SERPL-MCNC: 6 MG/DL (ref 6–20)
CALCIUM SERPL-MCNC: 8.9 MG/DL (ref 8.6–10.4)
CHLORIDE SERPL-SCNC: 105 MMOL/L (ref 98–107)
CO2 SERPL-SCNC: 26 MMOL/L (ref 20–31)
CREAT SERPL-MCNC: 0.6 MG/DL (ref 0.5–0.9)
EOSINOPHIL # BLD: 0.04 K/UL (ref 0–0.4)
EOSINOPHILS RELATIVE PERCENT: 1 % (ref 0–5)
ERYTHROCYTE [DISTWIDTH] IN BLOOD BY AUTOMATED COUNT: 23.6 % (ref 12.1–15.2)
GFR, ESTIMATED: >90 ML/MIN/1.73M2
GLUCOSE SERPL-MCNC: 119 MG/DL (ref 70–99)
HCT VFR BLD AUTO: 26.5 % (ref 36–46)
HGB BLD-MCNC: 8.4 G/DL (ref 12–16)
IMM GRANULOCYTES # BLD AUTO: ABNORMAL K/UL (ref 0–0.3)
IMM GRANULOCYTES NFR BLD: ABNORMAL %
LYMPHOCYTES NFR BLD: 2.11 K/UL (ref 1–4.8)
LYMPHOCYTES RELATIVE PERCENT: 54 % (ref 15–40)
MCH RBC QN AUTO: 30.9 PG (ref 26–34)
MCHC RBC AUTO-ENTMCNC: 31.7 G/DL (ref 31–37)
MCV RBC AUTO: 97.4 FL (ref 80–100)
MONOCYTES NFR BLD: 0.31 K/UL (ref 0–1)
MONOCYTES NFR BLD: 8 % (ref 4–8)
MORPHOLOGY: ABNORMAL
NEUTROPHILS NFR BLD: 36 % (ref 47–75)
NEUTS SEG NFR BLD: 1.4 K/UL (ref 2.5–7)
PLATELET # BLD AUTO: 251 K/UL (ref 140–450)
PMV BLD AUTO: 10.6 FL (ref 6–12)
POTASSIUM SERPL-SCNC: 3.8 MMOL/L (ref 3.7–5.3)
PROT SERPL-MCNC: 6.2 G/DL (ref 6.4–8.3)
RBC # BLD AUTO: 2.72 M/UL (ref 4–5.2)
SODIUM SERPL-SCNC: 139 MMOL/L (ref 135–144)
WBC OTHER # BLD: 3.9 K/UL (ref 3.5–11)

## 2025-02-27 PROCEDURE — 96366 THER/PROPH/DIAG IV INF ADDON: CPT

## 2025-02-27 PROCEDURE — 80053 COMPREHEN METABOLIC PANEL: CPT

## 2025-02-27 PROCEDURE — 96375 TX/PRO/DX INJ NEW DRUG ADDON: CPT

## 2025-02-27 PROCEDURE — 96365 THER/PROPH/DIAG IV INF INIT: CPT

## 2025-02-27 PROCEDURE — 6360000002 HC RX W HCPCS: Performed by: INTERNAL MEDICINE

## 2025-02-27 PROCEDURE — 2580000003 HC RX 258: Performed by: INTERNAL MEDICINE

## 2025-02-27 PROCEDURE — 96413 CHEMO IV INFUSION 1 HR: CPT

## 2025-02-27 PROCEDURE — 96374 THER/PROPH/DIAG INJ IV PUSH: CPT

## 2025-02-27 PROCEDURE — 2500000003 HC RX 250 WO HCPCS: Performed by: INTERNAL MEDICINE

## 2025-02-27 PROCEDURE — 96411 CHEMO IV PUSH ADDL DRUG: CPT

## 2025-02-27 PROCEDURE — 96409 CHEMO IV PUSH SNGL DRUG: CPT

## 2025-02-27 PROCEDURE — 85025 COMPLETE CBC W/AUTO DIFF WBC: CPT

## 2025-02-27 PROCEDURE — 96417 CHEMO IV INFUS EACH ADDL SEQ: CPT

## 2025-02-27 RX ORDER — DEXAMETHASONE SODIUM PHOSPHATE 10 MG/ML
10 INJECTION, SOLUTION INTRAMUSCULAR; INTRAVENOUS ONCE
Status: COMPLETED | OUTPATIENT
Start: 2025-02-27 | End: 2025-02-27

## 2025-02-27 RX ORDER — PALONOSETRON 0.05 MG/ML
0.25 INJECTION, SOLUTION INTRAVENOUS ONCE
Status: COMPLETED | OUTPATIENT
Start: 2025-02-27 | End: 2025-02-27

## 2025-02-27 RX ORDER — DIPHENHYDRAMINE HYDROCHLORIDE 50 MG/ML
50 INJECTION INTRAMUSCULAR; INTRAVENOUS ONCE
Status: COMPLETED | OUTPATIENT
Start: 2025-02-27 | End: 2025-02-27

## 2025-02-27 RX ORDER — SODIUM CHLORIDE 9 MG/ML
5-250 INJECTION, SOLUTION INTRAVENOUS PRN
Status: DISCONTINUED | OUTPATIENT
Start: 2025-02-27 | End: 2025-02-28 | Stop reason: HOSPADM

## 2025-02-27 RX ORDER — SODIUM CHLORIDE 0.9 % (FLUSH) 0.9 %
5-40 SYRINGE (ML) INJECTION PRN
Status: DISCONTINUED | OUTPATIENT
Start: 2025-02-27 | End: 2025-02-28 | Stop reason: HOSPADM

## 2025-02-27 RX ORDER — HEPARIN 100 UNIT/ML
500 SYRINGE INTRAVENOUS PRN
Status: DISCONTINUED | OUTPATIENT
Start: 2025-02-27 | End: 2025-02-28 | Stop reason: HOSPADM

## 2025-02-27 RX ADMIN — FAMOTIDINE 20 MG: 10 INJECTION, SOLUTION INTRAVENOUS at 09:40

## 2025-02-27 RX ADMIN — DEXAMETHASONE SODIUM PHOSPHATE 10 MG: 10 INJECTION, SOLUTION INTRAMUSCULAR; INTRAVENOUS at 09:26

## 2025-02-27 RX ADMIN — SODIUM CHLORIDE, PRESERVATIVE FREE 20 ML: 5 INJECTION INTRAVENOUS at 12:23

## 2025-02-27 RX ADMIN — PACLITAXEL 132 MG: 6 INJECTION, SOLUTION INTRAVENOUS at 10:44

## 2025-02-27 RX ADMIN — DIPHENHYDRAMINE HYDROCHLORIDE 50 MG: 50 INJECTION INTRAMUSCULAR; INTRAVENOUS at 09:30

## 2025-02-27 RX ADMIN — CARBOPLATIN 235 MG: 10 INJECTION, SOLUTION INTRAVENOUS at 11:49

## 2025-02-27 RX ADMIN — PALONOSETRON 0.25 MG: 0.05 INJECTION, SOLUTION INTRAVENOUS at 09:24

## 2025-02-27 RX ADMIN — HEPARIN 500 UNITS: 100 SYRINGE at 12:27

## 2025-02-27 RX ADMIN — SODIUM CHLORIDE 100 ML/HR: 9 INJECTION, SOLUTION INTRAVENOUS at 09:23

## 2025-02-27 NOTE — PROGRESS NOTES
Spiritual Services Interventions  Room/bed info not found   2/27/2025  Sr Samantha Llanes  39 y.o. year old female    Encounter Summary  Encounter Overview/Reason: Initial Encounter  Service Provided For: Patient  Referral/Consult From: Larry  Last Encounter : 02/27/25  Complexity of Encounter: Low  Begin Time: 0855  End Time : 0905  Total Time Calculated: 10 min     Spiritual/Emotional needs  Type: Spiritual Support                    Assessment/Intervention/Outcome  Assessment: Calm  Intervention: Active listening, Prayer (assurance of)/[place]  Outcome: Expressed Gratitude

## 2025-02-27 NOTE — PROGRESS NOTES
Pt labs within treatment plan parameters. Pt reports feeling well this last week and wishing to proceed with last tx. Given breakfast tray.

## 2025-02-28 ENCOUNTER — OFFICE VISIT (OUTPATIENT)
Dept: GYNECOLOGIC ONCOLOGY | Age: 40
End: 2025-02-28
Payer: COMMERCIAL

## 2025-02-28 VITALS
SYSTOLIC BLOOD PRESSURE: 118 MMHG | TEMPERATURE: 99 F | WEIGHT: 144.4 LBS | HEART RATE: 100 BPM | BODY MASS INDEX: 26.84 KG/M2 | DIASTOLIC BLOOD PRESSURE: 60 MMHG | OXYGEN SATURATION: 100 %

## 2025-02-28 DIAGNOSIS — C53.9 MALIGNANT NEOPLASM OF CERVIX, UNSPECIFIED SITE (HCC): Primary | ICD-10-CM

## 2025-02-28 DIAGNOSIS — N82.0 VESICOVAGINAL FISTULA: ICD-10-CM

## 2025-02-28 PROCEDURE — 99214 OFFICE O/P EST MOD 30 MIN: CPT | Performed by: OBSTETRICS & GYNECOLOGY

## 2025-02-28 ASSESSMENT — ENCOUNTER SYMPTOMS
BLOOD IN STOOL: 0
VOICE CHANGE: 0
SORE THROAT: 0
COUGH: 0
WHEEZING: 0
HEMOPTYSIS: 0
SHORTNESS OF BREATH: 0
BACK PAIN: 1
NAUSEA: 0
ABDOMINAL DISTENTION: 0
CONSTIPATION: 0
VOMITING: 0
RECTAL PAIN: 0
DIARRHEA: 0
SCLERAL ICTERUS: 0
CHEST TIGHTNESS: 0
ABDOMINAL PAIN: 0
EYE PROBLEMS: 0
TROUBLE SWALLOWING: 0

## 2025-02-28 NOTE — PROGRESS NOTES
[facility]  [address], [address]  [place] [id number]    Nola Llanes present for her cervical cancer and vesicovaginal fistula surveillance visit.     CC/HPI:   She has a history of NATHAN-3 on biopsy and advanced large cervix cancer with fistula.      She has undergone bilateral nephrostomy tube placement.     Patient has been undergoing induction chemotherapy and currently is on CarboTaxol weekly x 5 weeks followed by cis/EBRT then brachytherapy.       ROS:  I have personally reviewed and agree with the review of systems done by my ancillary staff in the EPIC documentation.      Past Medical History:   Diagnosis Date    Anxiety     Drug addict (HCC)     Hepatitis C     Liver disease          Past Surgical History:   Procedure Laterality Date    TUBAL LIGATION  2015         Family History   Problem Relation Age of Onset    No Known Problems Mother     Alcohol Abuse Father     Diabetes Maternal Grandmother          Social History     Tobacco Use    Smoking status: Every Day     Current packs/day: 0.50     Average packs/day: 0.5 packs/day for 18.7 years (9.4 ttl pk-yrs)     Types: Cigarettes     Start date: 6/12/2006     Passive exposure: Current    Smokeless tobacco: Never   Vaping Use    Vaping status: Every Day    Substances: Always   Substance Use Topics    Alcohol use: Not Currently     Alcohol/week: 10.0 standard drinks of alcohol     Types: 10 Cans of beer per week    Drug use: Not Currently     Types: Cocaine     Comment: Last use is 2013         Allergies   Allergen Reactions    Septra [Sulfamethoxazole-Trimethoprim] Hives         Physical Exam:    Vitals:    02/28/25 0852   BP: 118/60   Site: Right Upper Arm   Position: Sitting   Cuff Size: Medium Adult   Pulse: 100   Temp: 99 °F (37.2 °C)   TempSrc: Infrared   SpO2: 100%   Weight: 65.5 kg (144 lb 6.4 oz)       General: well- appearing, no acute distress, alert and oriented x 3    HEENT: Thyroid normal size. No

## 2025-02-28 NOTE — PROGRESS NOTES
Review of Systems   Constitutional:  Positive for fatigue. Negative for appetite change, chills, diaphoresis, fever and unexpected weight change.   HENT:   Negative for hearing loss, lump/mass, mouth sores, nosebleeds, sore throat, tinnitus, trouble swallowing and voice change.    Eyes:  Negative for eye problems and icterus.   Respiratory:  Negative for chest tightness, cough, hemoptysis, shortness of breath and wheezing.    Cardiovascular:  Positive for leg swelling (bilat ankles). Negative for chest pain and palpitations.   Gastrointestinal:  Negative for abdominal distention, abdominal pain, blood in stool, constipation, diarrhea, nausea, rectal pain and vomiting.   Endocrine: Positive for hot flashes (occasional).   Genitourinary:  Negative for bladder incontinence, difficulty urinating, dyspareunia, dysuria, frequency, hematuria, menstrual problem, nocturia, pelvic pain, vaginal bleeding and vaginal discharge.         Bilat nephrostomy tubes, patent   Musculoskeletal:  Positive for back pain (from bilat nephrostomies). Negative for arthralgias, flank pain, gait problem, myalgias, neck pain and neck stiffness.   Skin:  Negative for itching, rash and wound.   Neurological:  Positive for headaches (dull headaches occasionally). Negative for dizziness, extremity weakness, gait problem, light-headedness, numbness, seizures and speech difficulty.   Hematological:  Negative for adenopathy. Does not bruise/bleed easily.   Psychiatric/Behavioral:  Positive for depression (Cymbalta helping). Negative for confusion, decreased concentration, sleep disturbance and suicidal ideas. The patient is nervous/anxious.

## 2025-02-28 NOTE — PROGRESS NOTES
Gyn Oncology Note    Patient seen and evaluated by me today  Chart notes records labs imaging path and history revd by me in detail today  She has finished her up front chemotherapy  She will start weekly cisplatin and EBRT in [place] soon  She declines vaginal exam today since she has not yet had her RT    I counseled her extensively today about her situation, complications, fistula and eventual surgical reconstruction (possibly)    Her exam today reveals an age appropriate woman in no distress  No adenopathy  Abdomen non tender  Bilateral PCN tubes in place and draining clear urine  CV and Chest normal    Plan:    Complete RT and weekly cisplatin over the next six weeks  PET scan in May 2025  Return here in May 2025  Consider GYN onc second opinion in [place] this spring    Surgical consideration after EBRT and cisplatin  She has a fistula so she will eventually need a urostomy created (at a minimum)   for her quality of life if her oncological prognosis permits this decision making in the months ahead    I spent 25 minutes in office today managing this case    SUSANNE Joshua MD

## 2025-03-06 ENCOUNTER — TELEPHONE (OUTPATIENT)
Dept: ONCOLOGY | Age: 40
End: 2025-03-06

## 2025-03-06 DIAGNOSIS — C77.5 METASTASIS TO ILIAC LYMPH NODE (HCC): ICD-10-CM

## 2025-03-06 DIAGNOSIS — C53.0 MALIGNANT NEOPLASM OF ENDOCERVIX (HCC): Primary | ICD-10-CM

## 2025-03-06 RX ORDER — DEXAMETHASONE SODIUM PHOSPHATE 10 MG/ML
10 INJECTION, SOLUTION INTRAMUSCULAR; INTRAVENOUS ONCE
OUTPATIENT
Start: 2025-03-24 | End: 2025-03-24

## 2025-03-06 RX ORDER — DEXAMETHASONE SODIUM PHOSPHATE 10 MG/ML
10 INJECTION, SOLUTION INTRAMUSCULAR; INTRAVENOUS ONCE
OUTPATIENT
Start: 2025-04-21 | End: 2025-04-21

## 2025-03-06 RX ORDER — DEXAMETHASONE SODIUM PHOSPHATE 10 MG/ML
10 INJECTION, SOLUTION INTRAMUSCULAR; INTRAVENOUS ONCE
OUTPATIENT
Start: 2025-03-17 | End: 2025-03-17

## 2025-03-06 RX ORDER — DEXAMETHASONE SODIUM PHOSPHATE 10 MG/ML
10 INJECTION, SOLUTION INTRAMUSCULAR; INTRAVENOUS ONCE
OUTPATIENT
Start: 2025-04-28 | End: 2025-04-28

## 2025-03-06 RX ORDER — DEXAMETHASONE SODIUM PHOSPHATE 10 MG/ML
10 INJECTION, SOLUTION INTRAMUSCULAR; INTRAVENOUS ONCE
OUTPATIENT
Start: 2025-04-14 | End: 2025-04-14

## 2025-03-06 RX ORDER — DEXAMETHASONE SODIUM PHOSPHATE 10 MG/ML
10 INJECTION, SOLUTION INTRAMUSCULAR; INTRAVENOUS ONCE
OUTPATIENT
Start: 2025-04-07 | End: 2025-04-07

## 2025-03-06 RX ORDER — DEXAMETHASONE SODIUM PHOSPHATE 10 MG/ML
10 INJECTION, SOLUTION INTRAMUSCULAR; INTRAVENOUS ONCE
OUTPATIENT
Start: 2025-03-31 | End: 2025-03-31

## 2025-03-06 NOTE — TELEPHONE ENCOUNTER
Call to patient to inform her that cisplatin chemotherapy is approved and we can start in the next couple weeks when radiation set for concurrent treatment.  She states has not met with radiation oncology since prior to her chemo.  She did call them yesterday to try to see status of next step.  Informed her that I will call them as well to let them know we need to begin next phase of treatment.    Spoke with staff at [facility] and they request copy of current physician note and labs be faxed to them which is done.  They further are going to transfer me to the physician but she is not presently available.  Provided them with Dr. Estevez phone and Radiation Oncologist to call Dr. Estevez directly.    Did explain to patient that first cisplatin dose will need to be administered in [place] and if no adverse reactions can transition back to [place] for treatment.

## 2025-03-24 ENCOUNTER — TELEMEDICINE (OUTPATIENT)
Dept: ONCOLOGY | Age: 40
End: 2025-03-24
Payer: COMMERCIAL

## 2025-03-24 DIAGNOSIS — C77.5 METASTASIS TO ILIAC LYMPH NODE (HCC): Primary | ICD-10-CM

## 2025-03-24 DIAGNOSIS — T45.1X5D ADVERSE EFFECT OF CHEMOTHERAPY, SUBSEQUENT ENCOUNTER: ICD-10-CM

## 2025-03-24 DIAGNOSIS — D75.89 MYELOSUPPRESSION AFTER CHEMOTHERAPY: ICD-10-CM

## 2025-03-24 DIAGNOSIS — C53.0 MALIGNANT NEOPLASM OF ENDOCERVIX (HCC): ICD-10-CM

## 2025-03-24 DIAGNOSIS — T45.1X5A MYELOSUPPRESSION AFTER CHEMOTHERAPY: ICD-10-CM

## 2025-03-24 PROCEDURE — 99214 OFFICE O/P EST MOD 30 MIN: CPT | Performed by: INTERNAL MEDICINE

## 2025-03-24 NOTE — PROGRESS NOTES
was evaluated through a synchronous (real-time) audio-video encounter. The patient (or guardian if applicable) is aware that this is a billable service, which includes applicable co-pays. This Virtual Visit was conducted with patient's (and/or legal guardian's) consent. Patient identification was verified, and a caregiver was present when appropriate.   The patient was located at Facility (Appt Department): [facility]   [facility] [place] [facility]        Total time spent for this encounter: Not billed by time  An electronic signature was used to authenticate this note.          Patient ID: Nola Llanes, 1985, [id number], 39 y.o.  Referred by :  No ref. provider found   Reason for consultation: cervical cancer,         Oncology history  Locally advanced cervical cancer with right external iliac lymphadenopathy      Oncology treatment  Induction chemotherapy CarboTaxol weekly for 6-week started January 16, 2025 and done on February 20/27 2025  concurrent chemo RT after induction chemotherapy to be started tomorrow March 25      HISTORY OF PRESENT ILLNESS:    Oncologic History:    Nola Llanes is a very pleasant 39 y.o. female.  Who was seen initially by GYN for NATHAN-3 and found to have advanced cervical cancer with fistula with obstructive uropathy status post bilateral nephrostomy tube placement at this time MRI PET/CT pending she was seen by GYN oncology and plan to proceed with concurrent chemo RT   Pathology as below      11/26/24 0000    Surgical Pathology Report Path Number: [id number]    -- Diagnosis --  CERVIX, BIOPSY:  - HIGH-GRADE SQUAMOUS INTRAEPITHELIAL LESION (NATHAN 3).  SEE COMMENT.   COMMENT: IMMUNOSTAIN FOR p16 (CONTROL APPROPRIATE) DEMONSTRATES A   POSITIVE STAINING PATTERN IN THE SQUAMOUS EPITHELIUM CONSISTENT WITH A   HIGH-GRADE SQUAMOUS INTRAEPITHELIAL LESION (NATHAN 3).  FOCAL INVOLVEMENT   OF ENDOCERVICAL EPITHELIUM BY THE

## 2025-03-25 ENCOUNTER — HOSPITAL ENCOUNTER (OUTPATIENT)
Dept: INFUSION THERAPY | Age: 40
Discharge: HOME | End: 2025-03-25
Payer: COMMERCIAL

## 2025-03-25 VITALS
DIASTOLIC BLOOD PRESSURE: 71 MMHG | TEMPERATURE: 98.3 F | RESPIRATION RATE: 18 BRPM | SYSTOLIC BLOOD PRESSURE: 107 MMHG | HEIGHT: 62 IN | HEART RATE: 95 BPM | WEIGHT: 140 LBS | BODY MASS INDEX: 25.76 KG/M2

## 2025-03-25 DIAGNOSIS — C77.5 METASTASIS TO ILIAC LYMPH NODE (HCC): ICD-10-CM

## 2025-03-25 DIAGNOSIS — C53.0 MALIGNANT NEOPLASM OF ENDOCERVIX (HCC): Primary | ICD-10-CM

## 2025-03-25 LAB
ALBUMIN SERPL-MCNC: 3.5 G/DL (ref 3.5–5.2)
ALBUMIN/GLOB SERPL: 1.3 {RATIO} (ref 1–2.5)
ALP SERPL-CCNC: 85 U/L (ref 35–104)
ALT SERPL-CCNC: 18 U/L (ref 10–35)
ANION GAP SERPL CALCULATED.3IONS-SCNC: 11 MMOL/L (ref 9–16)
AST SERPL-CCNC: 38 U/L (ref 10–35)
BASOPHILS # BLD: 0 K/UL (ref 0–0.2)
BASOPHILS NFR BLD: 0 % (ref 0–2)
BILIRUB SERPL-MCNC: 0.3 MG/DL (ref 0–1.2)
BUN SERPL-MCNC: 3 MG/DL (ref 6–20)
BUN/CREAT SERPL: 5 (ref 9–20)
CALCIUM SERPL-MCNC: 8.9 MG/DL (ref 8.6–10.4)
CHLORIDE SERPL-SCNC: 105 MMOL/L (ref 98–107)
CO2 SERPL-SCNC: 25 MMOL/L (ref 20–31)
CREAT SERPL-MCNC: 0.6 MG/DL (ref 0.5–0.9)
EOSINOPHIL # BLD: 0.28 K/UL (ref 0–0.44)
EOSINOPHILS RELATIVE PERCENT: 5 % (ref 1–4)
ERYTHROCYTE [DISTWIDTH] IN BLOOD BY AUTOMATED COUNT: 24.9 % (ref 11.8–14.4)
GFR, ESTIMATED: >90 ML/MIN/1.73M2
GLUCOSE SERPL-MCNC: 87 MG/DL (ref 74–99)
HCT VFR BLD AUTO: 33.1 % (ref 36.3–47.1)
HGB BLD-MCNC: 10.7 G/DL (ref 11.9–15.1)
IMM GRANULOCYTES # BLD AUTO: 0 K/UL (ref 0–0.3)
IMM GRANULOCYTES NFR BLD: 0 %
LYMPHOCYTES NFR BLD: 1.9 K/UL (ref 1.1–3.7)
LYMPHOCYTES RELATIVE PERCENT: 34 % (ref 24–43)
MAGNESIUM SERPL-MCNC: 1.6 MG/DL (ref 1.6–2.6)
MCH RBC QN AUTO: 34 PG (ref 25.2–33.5)
MCHC RBC AUTO-ENTMCNC: 32.3 G/DL (ref 28.4–34.8)
MCV RBC AUTO: 105.1 FL (ref 82.6–102.9)
MONOCYTES NFR BLD: 0.56 K/UL (ref 0.1–1.2)
MONOCYTES NFR BLD: 10 % (ref 3–12)
MORPHOLOGY: ABNORMAL
MORPHOLOGY: ABNORMAL
NEUTROPHILS NFR BLD: 51 % (ref 36–65)
NEUTS SEG NFR BLD: 2.86 K/UL (ref 1.5–8.1)
NRBC BLD-RTO: 0 PER 100 WBC
PLATELET # BLD AUTO: ABNORMAL K/UL (ref 138–453)
PLATELET, FLUORESCENCE: 71 K/UL (ref 138–453)
PLATELETS.RETICULATED NFR BLD AUTO: 3.9 % (ref 1.1–10.3)
POTASSIUM SERPL-SCNC: 3.9 MMOL/L (ref 3.7–5.3)
PROT SERPL-MCNC: 6.3 G/DL (ref 6.6–8.7)
RBC # BLD AUTO: 3.15 M/UL (ref 3.95–5.11)
SODIUM SERPL-SCNC: 141 MMOL/L (ref 136–145)
WBC OTHER # BLD: 5.6 K/UL (ref 3.5–11.3)

## 2025-03-25 PROCEDURE — 80053 COMPREHEN METABOLIC PANEL: CPT

## 2025-03-25 PROCEDURE — 2500000003 HC RX 250 WO HCPCS: Performed by: INTERNAL MEDICINE

## 2025-03-25 PROCEDURE — 85025 COMPLETE CBC W/AUTO DIFF WBC: CPT

## 2025-03-25 PROCEDURE — 96361 HYDRATE IV INFUSION ADD-ON: CPT

## 2025-03-25 PROCEDURE — 96367 TX/PROPH/DG ADDL SEQ IV INF: CPT

## 2025-03-25 PROCEDURE — 2580000003 HC RX 258: Performed by: INTERNAL MEDICINE

## 2025-03-25 PROCEDURE — 96413 CHEMO IV INFUSION 1 HR: CPT

## 2025-03-25 PROCEDURE — 96375 TX/PRO/DX INJ NEW DRUG ADDON: CPT

## 2025-03-25 PROCEDURE — 6360000002 HC RX W HCPCS: Performed by: INTERNAL MEDICINE

## 2025-03-25 PROCEDURE — 83735 ASSAY OF MAGNESIUM: CPT

## 2025-03-25 RX ORDER — SODIUM CHLORIDE 9 MG/ML
5-250 INJECTION, SOLUTION INTRAVENOUS PRN
Status: DISCONTINUED | OUTPATIENT
Start: 2025-03-25 | End: 2025-03-26 | Stop reason: HOSPADM

## 2025-03-25 RX ORDER — DEXAMETHASONE SODIUM PHOSPHATE 10 MG/ML
10 INJECTION, SOLUTION INTRAMUSCULAR; INTRAVENOUS ONCE
Status: COMPLETED | OUTPATIENT
Start: 2025-03-25 | End: 2025-03-25

## 2025-03-25 RX ORDER — SODIUM CHLORIDE 0.9 % (FLUSH) 0.9 %
5-40 SYRINGE (ML) INJECTION PRN
Status: DISCONTINUED | OUTPATIENT
Start: 2025-03-25 | End: 2025-03-26 | Stop reason: HOSPADM

## 2025-03-25 RX ORDER — PALONOSETRON 0.05 MG/ML
0.25 INJECTION, SOLUTION INTRAVENOUS ONCE
Status: COMPLETED | OUTPATIENT
Start: 2025-03-25 | End: 2025-03-25

## 2025-03-25 RX ORDER — HEPARIN 100 UNIT/ML
500 SYRINGE INTRAVENOUS PRN
Status: DISCONTINUED | OUTPATIENT
Start: 2025-03-25 | End: 2025-03-26 | Stop reason: HOSPADM

## 2025-03-25 RX ADMIN — SODIUM CHLORIDE, PRESERVATIVE FREE 10 ML: 5 INJECTION INTRAVENOUS at 08:12

## 2025-03-25 RX ADMIN — DEXAMETHASONE SODIUM PHOSPHATE 10 MG: 10 INJECTION, SOLUTION INTRAMUSCULAR; INTRAVENOUS at 09:43

## 2025-03-25 RX ADMIN — SODIUM CHLORIDE 150 MG: 0.9 INJECTION, SOLUTION INTRAVENOUS at 09:57

## 2025-03-25 RX ADMIN — SODIUM CHLORIDE, PRESERVATIVE FREE 10 ML: 5 INJECTION INTRAVENOUS at 13:43

## 2025-03-25 RX ADMIN — MAGNESIUM SULFATE HEPTAHYDRATE: 500 INJECTION, SOLUTION INTRAMUSCULAR; INTRAVENOUS at 10:25

## 2025-03-25 RX ADMIN — PALONOSETRON 0.25 MG: 0.05 INJECTION, SOLUTION INTRAVENOUS at 09:43

## 2025-03-25 RX ADMIN — SODIUM CHLORIDE, PRESERVATIVE FREE 10 ML: 5 INJECTION INTRAVENOUS at 13:44

## 2025-03-25 RX ADMIN — MAGNESIUM SULFATE HEPTAHYDRATE: 500 INJECTION, SOLUTION INTRAMUSCULAR; INTRAVENOUS at 12:41

## 2025-03-25 RX ADMIN — CISPLATIN 51 MG: 1 INJECTION, SOLUTION INTRAVENOUS at 11:34

## 2025-03-25 RX ADMIN — HEPARIN 500 UNITS: 100 SYRINGE at 13:44

## 2025-03-25 RX ADMIN — SODIUM CHLORIDE, PRESERVATIVE FREE 10 ML: 5 INJECTION INTRAVENOUS at 08:13

## 2025-03-25 RX ADMIN — SODIUM CHLORIDE 100 ML/HR: 0.9 INJECTION, SOLUTION INTRAVENOUS at 09:42

## 2025-03-25 NOTE — PLAN OF CARE
Problem: Safety - Adult  Goal: Free from fall injury  3/25/2025 1420 by Leopold, Jessica R, RN  Outcome: Adequate for Discharge  3/25/2025 1054 by Leopold, Jessica R, RN  Outcome: Adequate for Discharge     Problem: Pain  Goal: Verbalizes/displays adequate comfort level or baseline comfort level  3/25/2025 1420 by Leopold, Jessica R, RN  Outcome: Adequate for Discharge  3/25/2025 1054 by Leopold, Jessica R, RN  Outcome: Adequate for Discharge

## 2025-04-01 ENCOUNTER — HOSPITAL ENCOUNTER (OUTPATIENT)
Dept: INFUSION THERAPY | Age: 40
Discharge: HOME | End: 2025-04-01
Payer: COMMERCIAL

## 2025-04-01 VITALS
TEMPERATURE: 96.5 F | SYSTOLIC BLOOD PRESSURE: 100 MMHG | WEIGHT: 140.6 LBS | OXYGEN SATURATION: 96 % | HEIGHT: 62 IN | BODY MASS INDEX: 25.88 KG/M2 | RESPIRATION RATE: 16 BRPM | DIASTOLIC BLOOD PRESSURE: 69 MMHG | HEART RATE: 74 BPM

## 2025-04-01 DIAGNOSIS — C53.0 MALIGNANT NEOPLASM OF ENDOCERVIX (HCC): Primary | ICD-10-CM

## 2025-04-01 DIAGNOSIS — C77.5 METASTASIS TO ILIAC LYMPH NODE (HCC): ICD-10-CM

## 2025-04-01 LAB
ALBUMIN SERPL-MCNC: 3.6 G/DL (ref 3.5–5.2)
ALBUMIN/GLOB SERPL: 1.3 {RATIO} (ref 1–2.5)
ALP SERPL-CCNC: 67 U/L (ref 35–104)
ALT SERPL-CCNC: 18 U/L (ref 5–33)
ANION GAP SERPL CALCULATED.3IONS-SCNC: 10 MMOL/L (ref 9–17)
AST SERPL-CCNC: 30 U/L
BASOPHILS # BLD: 0 K/UL (ref 0–0.2)
BASOPHILS NFR BLD: 0 % (ref 0–2)
BILIRUB SERPL-MCNC: 0.5 MG/DL (ref 0.3–1.2)
BUN SERPL-MCNC: 7 MG/DL (ref 6–20)
CALCIUM SERPL-MCNC: 9.2 MG/DL (ref 8.6–10.4)
CHLORIDE SERPL-SCNC: 97 MMOL/L (ref 98–107)
CO2 SERPL-SCNC: 28 MMOL/L (ref 20–31)
CREAT SERPL-MCNC: 0.6 MG/DL (ref 0.5–0.9)
EOSINOPHIL # BLD: 0.06 K/UL (ref 0–0.4)
EOSINOPHILS RELATIVE PERCENT: 2 % (ref 0–5)
ERYTHROCYTE [DISTWIDTH] IN BLOOD BY AUTOMATED COUNT: 21 % (ref 12.1–15.2)
GFR, ESTIMATED: >90 ML/MIN/1.73M2
GLUCOSE SERPL-MCNC: 94 MG/DL (ref 70–99)
HCT VFR BLD AUTO: 28.3 % (ref 36–46)
HGB BLD-MCNC: 9.4 G/DL (ref 12–16)
IMM GRANULOCYTES # BLD AUTO: 0.01 K/UL (ref 0–0.3)
IMM GRANULOCYTES NFR BLD: 0 % (ref 0–5)
LYMPHOCYTES NFR BLD: 1.36 K/UL (ref 1–4.8)
LYMPHOCYTES RELATIVE PERCENT: 33 % (ref 15–40)
MAGNESIUM SERPL-MCNC: 1.6 MG/DL (ref 1.6–2.6)
MCH RBC QN AUTO: 34.9 PG (ref 26–34)
MCHC RBC AUTO-ENTMCNC: 33.2 G/DL (ref 31–37)
MCV RBC AUTO: 105.2 FL (ref 80–100)
MONOCYTES NFR BLD: 0.21 K/UL (ref 0–1)
MONOCYTES NFR BLD: 5 % (ref 4–8)
MORPHOLOGY: ABNORMAL
NEUTROPHILS NFR BLD: 60 % (ref 47–75)
NEUTS SEG NFR BLD: 2.49 K/UL (ref 2.5–7)
PLATELET # BLD AUTO: 57 K/UL (ref 140–450)
PMV BLD AUTO: 10.5 FL (ref 6–12)
POTASSIUM SERPL-SCNC: 4 MMOL/L (ref 3.7–5.3)
PROT SERPL-MCNC: 6.3 G/DL (ref 6.4–8.3)
RBC # BLD AUTO: 2.69 M/UL (ref 4–5.2)
SODIUM SERPL-SCNC: 135 MMOL/L (ref 135–144)
WBC OTHER # BLD: 4.1 K/UL (ref 3.5–11)

## 2025-04-01 PROCEDURE — 96367 TX/PROPH/DG ADDL SEQ IV INF: CPT

## 2025-04-01 PROCEDURE — 96413 CHEMO IV INFUSION 1 HR: CPT

## 2025-04-01 PROCEDURE — 80053 COMPREHEN METABOLIC PANEL: CPT

## 2025-04-01 PROCEDURE — 96375 TX/PRO/DX INJ NEW DRUG ADDON: CPT

## 2025-04-01 PROCEDURE — 6360000002 HC RX W HCPCS: Performed by: INTERNAL MEDICINE

## 2025-04-01 PROCEDURE — 83735 ASSAY OF MAGNESIUM: CPT

## 2025-04-01 PROCEDURE — 2500000003 HC RX 250 WO HCPCS: Performed by: INTERNAL MEDICINE

## 2025-04-01 PROCEDURE — 2580000003 HC RX 258: Performed by: INTERNAL MEDICINE

## 2025-04-01 PROCEDURE — 96417 CHEMO IV INFUS EACH ADDL SEQ: CPT

## 2025-04-01 PROCEDURE — 85025 COMPLETE CBC W/AUTO DIFF WBC: CPT

## 2025-04-01 PROCEDURE — 96365 THER/PROPH/DIAG IV INF INIT: CPT

## 2025-04-01 RX ORDER — SODIUM CHLORIDE 9 MG/ML
5-250 INJECTION, SOLUTION INTRAVENOUS PRN
Status: DISCONTINUED | OUTPATIENT
Start: 2025-04-01 | End: 2025-04-02 | Stop reason: HOSPADM

## 2025-04-01 RX ORDER — SODIUM CHLORIDE 0.9 % (FLUSH) 0.9 %
5-40 SYRINGE (ML) INJECTION PRN
Status: DISCONTINUED | OUTPATIENT
Start: 2025-04-01 | End: 2025-04-02 | Stop reason: HOSPADM

## 2025-04-01 RX ORDER — PALONOSETRON 0.05 MG/ML
0.25 INJECTION, SOLUTION INTRAVENOUS ONCE
Status: COMPLETED | OUTPATIENT
Start: 2025-04-01 | End: 2025-04-01

## 2025-04-01 RX ORDER — HEPARIN 100 UNIT/ML
500 SYRINGE INTRAVENOUS PRN
Status: DISCONTINUED | OUTPATIENT
Start: 2025-04-01 | End: 2025-04-02 | Stop reason: HOSPADM

## 2025-04-01 RX ORDER — DEXAMETHASONE SODIUM PHOSPHATE 10 MG/ML
10 INJECTION, SOLUTION INTRAMUSCULAR; INTRAVENOUS ONCE
Status: COMPLETED | OUTPATIENT
Start: 2025-04-01 | End: 2025-04-01

## 2025-04-01 RX ADMIN — SODIUM CHLORIDE 150 MG: 9 INJECTION, SOLUTION INTRAVENOUS at 11:32

## 2025-04-01 RX ADMIN — POTASSIUM CHLORIDE: 2 INJECTION, SOLUTION, CONCENTRATE INTRAVENOUS at 10:22

## 2025-04-01 RX ADMIN — PALONOSETRON 0.25 MG: 0.05 INJECTION, SOLUTION INTRAVENOUS at 08:52

## 2025-04-01 RX ADMIN — DEXAMETHASONE SODIUM PHOSPHATE 10 MG: 10 INJECTION, SOLUTION INTRAMUSCULAR; INTRAVENOUS at 08:53

## 2025-04-01 RX ADMIN — CISPLATIN 51 MG: 1 INJECTION, SOLUTION INTRAVENOUS at 12:00

## 2025-04-01 RX ADMIN — SODIUM CHLORIDE 100 ML/HR: 0.9 INJECTION, SOLUTION INTRAVENOUS at 08:49

## 2025-04-01 RX ADMIN — HEPARIN 500 UNITS: 100 SYRINGE at 13:07

## 2025-04-01 RX ADMIN — SODIUM CHLORIDE, PRESERVATIVE FREE 20 ML: 5 INJECTION INTRAVENOUS at 13:06

## 2025-04-01 ASSESSMENT — PAIN DESCRIPTION - LOCATION: LOCATION: PELVIS

## 2025-04-01 ASSESSMENT — PAIN DESCRIPTION - DESCRIPTORS: DESCRIPTORS: BURNING

## 2025-04-01 ASSESSMENT — PAIN SCALES - GENERAL
PAINLEVEL_OUTOF10: 2
PAINLEVEL_OUTOF10: 2

## 2025-04-01 ASSESSMENT — PAIN DESCRIPTION - ORIENTATION: ORIENTATION: LOWER

## 2025-04-01 NOTE — PLAN OF CARE
Problem: Pain  Goal: Verbalizes/displays adequate comfort level or baseline comfort level  Outcome: Adequate for Discharge     Problem: Safety - Adult  Goal: Free from fall injury  Reactivated

## 2025-04-04 ENCOUNTER — TELEPHONE (OUTPATIENT)
Dept: RADIATION ONCOLOGY | Facility: HOSPITAL | Age: 40
End: 2025-04-04
Payer: COMMERCIAL

## 2025-04-06 NOTE — PROGRESS NOTES
Radiation Oncology Outpatient Consult    Patient Name:  Madalyn Manzano  MRN:  [id number]  :  1985    Referring Provider: No ref. provider found  Primary Care Provider: LEIA Callaway  Care Team: Patient Care Team:  LEIA Callaway as PCP - General (Family Medicine)  Gracie Quiroz MD as Obstetrician (Obstetrics and Gynecology)    Date of Service: 2025     DIAGNOSIS:  Stage CORNELIA cervical SCC, cT4 cN1a cM0, with vesicovaginal fistula    CANCER HISTORY:  -2024 Seen by Dr. Pearce for vaginal discharge and leak of urine. Pap smear showed high risk HPV. Bx showed CIN3. Focal involvement of endocervical epithelium by the high-grade intraepithelial lesion is present.   -2024 CT CAP showed bilateral prominent hydronephrosis and hydroureter. Possible fistula between vagina and bladder.   -2024 Seen by Gyn-Onc Dr. Can for CIN3. However, on exam, she was found to have a large cervical tumor  -2024 s/p bilateral nephrostomy tube placement for hydronephrosis due to obstructive cervical ca.  2025 and 2025 seen by Med Onc Dr. Yin. Recommends induction chemo carbotaxol weekly for 6 weeks then weekly cisplatin 6 weeks with EBRT  -2024 PET CT showed abnormal activity involving endometrial canal extending to the cervical region. PET avid right external iliac lymph node.   -2025 MRI showed heterogenous enhancement and thickening of the cervix consistent with cervical ca, measuring 4.6x2.3x6.1cm. extending along the anterior wall of the vagina and likely involves the posterior wall of the urinary bladder. Likely a fistulous tract between the urinary bladder and vagina. Bilateral pelvic sidewall and external iliac lymph nodes.   -2025 started on induction chemotherapy carbotaxol weekly for 6-week   2025 Seen by Dr. Quiroz for second opinion. Agreed with the plan.     SUBJECTIVE  History of Present Illness:  Madalyn Manzano is a 39  y.o. female with history of anxiety, Hep C, polysubstance abuse, KUNAL-3, with recently diagnosed Stage CORNELIA cervical SCC, cT4 cN1a cM0, with vesicovaginal fistula, obstructive uropathy now s/p bilateral nephrostomy. She reported pelvic pain and intermittent vaginal bleeding/discharge started 1.5 years ago and was treated for PH imbalance by her PCP without improvement. She was initially referred to Dr. Can at [facility] for a biopsy-proved CIN3 but was found to have a huge advanced cervical cancer and a fistula on 2024. She is seen by Dr. Quiroz at [facility] and confirmed the stage CORNELIA cervical ca.    Ms. Manzano is currently being treated as per Interlace Protocol with Dr. Yin (med onc), and EBRT under Dr. Jeanne Vaca's direction. Ms. Manzano presents today to discuss the role of brachytherapy in her care    Today, she is reporting chronic pain in her pelvis which she has had for about 2 years. She takes OTC pain medicine and is also followed by palliative care and was started on buprenorphine. Last time she used heroine/cocaine . She endorses intermittent nausea from chemo. She lives with her 2 grandchildren who she cares for.    Fatigue  [] None   [x] Grade 1 fatigue: Relieved by rest   [] Grade 2 fatigue: Not relieved by rest; limiting instrumental ADL  [] Grade 3 fatigue: Not relieved by rest, limiting self care ADL    GI:  Bowel complaints: None     :  Urinary complaints  S/p neph tubes which drain clear urine.  She has VVF.   Denies any urge; incontinent.     Denies vaginal bleeding    Not sexually active for 6-7 months.     Dr. Vaca referred the patient for evaluation and treatment for radiation.   I personally reviewed the available outside records and imaging studies.    Ms. Manzano has locally advanced cervical cancer, which left untreated can lead to progression and can be life threatening.        5   0  Smoking 1/2 pack day; occasional EtOH    A >10 point review of systems  was performed and was negative unless mentioned in the Interval History above.    Prior Radiotherapy:  as per HPI, RT for current malignancy with Dr. Vaca   Radiation Treatments       No radiation treatments to show. (Treatments may have been administered in another system.)          Current Systemic Treatment:  Yes, describe: carbotaxelx2 cycles , + concurrent cisplatin; prior to current malignancy no other chemo     Presence of Pacemaker or ICD:  No    Past Medical History:    Past Medical History:   Diagnosis Date    Hepatitis C    -Polysubstance abuse      Past Surgical History:  History reviewed. No pertinent surgical history.     Family History:  Cancer-related family history is not on file.    Social History:    Social History     Tobacco Use    Smoking status: Every Day     Types: Cigarettes    Smokeless tobacco: Never   Substance Use Topics    Alcohol use: Yes     Comment: special occasions    Drug use: Never     Comment: Pt with former drug use. Last use date 11/18/13. Heroin and cocaine.       Allergies:    Allergies   Allergen Reactions    Sulfamethoxazole-Trimethoprim Hives        Medications:    Current Outpatient Medications:     buprenorphine-naloxone (Suboxone) 2-0.5 mg SL tablet, Place under the tongue., Disp: , Rfl:     HYDROcodone-acetaminophen (Hycet) 7.5-325 mg/15 mL solution, Take by mouth every 6 hours if needed for severe pain (7 - 10)., Disp: , Rfl:     multivitamin tablet, Take 1 tablet by mouth once daily., Disp: , Rfl:     tiZANidine (Zanaflex) 4 mg capsule, Take 1 capsule (4 mg) by mouth 3 times a day., Disp: , Rfl:     DULoxetine (Cymbalta) 20 mg DR capsule, Take 1 capsule (20 mg) by mouth once daily. Do not crush or chew., Disp: , Rfl:     ondansetron (Zofran) 8 mg tablet, Take 1 tablet (8 mg) by mouth every 8 hours if needed for nausea or vomiting., Disp: , Rfl:     prazosin (Minipress) 1 mg capsule, Take 1 capsule (1 mg) by mouth once daily at bedtime., Disp: , Rfl:       Review  of Systems:   Review of Systems:  Review of Systems   Constitutional:  Positive for appetite change and fatigue.   HENT:  Negative.     Eyes: Negative.    Respiratory: Negative.     Cardiovascular: Negative.    Gastrointestinal: Negative.    Endocrine: Negative.    Genitourinary:  Positive for bladder incontinence and pelvic pain.    Musculoskeletal: Negative.    Skin: Negative.    Neurological: Negative.    Hematological: Negative.    Psychiatric/Behavioral: Negative.      The patient's current pain level was assessed.  They report currently having a pain of 2 out of 10.  They feel their pain is under control with the use of pain medications.      Performance Status:  The Karnofsky performance scale today is 80, Normal activity with effort; some signs or symptoms of disease (ECOG equivalent 1).        OBJECTIVE  Physical Exam:  /70   Pulse 100 Comment: RN recheck  Temp 36.3 °C (97.3 °F) (Temporal)   Resp 18   Wt 64.4 kg (142 lb)   SpO2 99%   BMI 26.83 kg/m²    Gen: well appearing, in NAD  ENT: Eyes symmetric  Resp: Breathing comfortably in room air  CV: Normal perfusion  Abd: Soft and non-distended  : s/p b/l neph tubes   Gyn:   External genitalia: Normal external genitalia, no vulvar masses or lesions  Vagina: no blood or abnormaldischarge  BME: large tumor involving anterior vaginal wall and bladder  SSE: unable to visualize cervix; speculum immediately filled with urine.  Extremities: symmetric  Skin: Normal    Laboratory Review:  There are no laboratory contraindications to radiation therapy.    The pertinent lab results were reviewed and discussed with the patient.     Pathology Review:  The pertinent pathology results were reviewed and discussed with the patient.  Notably,    11/26/24 0000     Surgical Pathology Report Path Number: [id number]    -- Diagnosis --  CERVIX, BIOPSY:  - HIGH-GRADE SQUAMOUS INTRAEPITHELIAL LESION (KUNAL 3).  SEE COMMENT.   COMMENT: IMMUNOSTAIN FOR p16 (CONTROL  APPROPRIATE) DEMONSTRATES A   POSITIVE STAINING PATTERN IN THE SQUAMOUS EPITHELIUM CONSISTENT WITH A   HIGH-GRADE SQUAMOUS INTRAEPITHELIAL LESION (KUNAL 3).  FOCAL INVOLVEMENT   OF ENDOCERVICAL EPITHELIUM BY THE HIGH-GRADE INTRAEPITHELIAL LESION IS   PRESENT.  MILD CHRONIC CERVICITIS IS PRESENT.  THERE IS NO EVIDENCE OF   INVASIVE NEOPLASIA IN THE BIOPSY.       Imaging:  The pertinent imaging results were reviewed and discussed with the patient.  Notably,        [facility]   [address] [place] [address]     MRI Report   Signed with Addenda     Patient: Madalyn Manzano MR#: [id number]   [id number]   : 1985 Acct:[id number]     Age/Sex: 39 / F ADM Date:    5   Loc: MR Room: Type: [facility]   Attending Dr: Jerad Can MD   Copies to: MD Jeanne Ta MD~         Ordering Provider: Jeanne Vaca MD   Date of Service: 25   Accession #: ([id number]) MR/MR pelvis wo/w con: GYN PROTOCOL**             **ADDENDUM** 1   In addition to the findings and impression below, there are prominent bilateral pelvic sidewall and external iliac lymph nodes, largest measuring 1 cm in short axis involving the right iliac chain. When correlating to the prior PET/CT from2024, this dominant lymph node appears to be FDG avid suspicious for metastatic disease.     Impression dictated by: Rigo Rinaldi Jr., D.O.2025 11:11 AM       Dictation Location: [facility]18       Addendum Dictated By: Rigo Rinaldi Jr DO     Addendum Signed By:   25 1111   Addendum Cosigned By:     DD/DT: 1048   TD/TT:      MRI OF THE PELVIS WITH AND WITHOUT CONTRAST:         CLINICAL HISTORY: History of cervical cancer.     COMPARISON: Outside CT abdomen and pelvis 2024     TECHNIQUE: Multisequence, multiplanar imaging of the pelvis was obtained before and after the use of IV contrast.     FINDINGS:     The examination is  significantly limited due to motion artifact.     The uterus measures 7.9 x 5.8 x 5.3 cm. Endometrium measures 11 mm in greatest thickness. Visualized portions of the junctional zone appears thickened at 15 mm. Abnormal fullness and enhancement involving the cervical region likely relating to the patient's known history of cancer. The abnormal soft tissue thickening and enhancement extends into the anterior portion of the vagina and likely involves the posterior wall of the urinary bladder best seen on sagittal series 11 image 20. This area measures approximately 4.6 x 2.3 x 6.1 cm. A communication with the urinary bladder lumen in the vagina is suspected as thereis a potential tract within this region also seen on series 11 image 20 with airpresent. The rectum does not appear to be involved. Both ovaries are identified with follicular changes present. Small amount of free fluid is noted. No enlarged pelvic lymph nodes are present. No suspicious bony lesions are seen.         ORDER #: [id number] MR/MR pelvis wo/w con   IMPRESSION:     SUBOPTIMAL STUDY DUE TO MOTION ARTIFACT. THERE APPEARS BE HETEROGENOUS ENHANCEMENT AND THICKENING OF THE CERVIX CONSISTENT WITH THE HISTORY MEASURING APPROXIMATELY 4.6 X 2.3 X 6.1 CM. THE ABNORMAL SOFT TISSUE THICKENING AND ENHANCEMENT EXTENDING ALONG THE ANTERIOR WALL OF THE VAGINA AND LIKELY INVOLVES THE POSTERIOR WALL OF THE URINARY BLADDER. THERE IS LIKELY A FISTULOUS TRACT BETWEEN THE URINARY BLADDER AND THE VAGINA WITH AIR PRESENT. PLEASE SEE SERIES 11 IMAGE 20. A SIMILAR PROCESS IS SEEN ON THE PRIOR OUTSIDE CT ABDOMEN AND PELVIS STUDY.     Impression dictated by: Rigo Rinaldi Jr., D.O.1/7/2025 6:55 PM       Dictation Location: [facility]         Transcribed By: [facility] 01/07/25 1855   Dictated By: Rigo Rinaldi Jr, DO 01/07/25 1838     Signed By:   01/07/25 1855   CT ABDOMEN PELVIS W IV CONTRAST Additional Contrast? Radiologist Recommendation  Narrative: EXAM: CT ABDOMEN  PELVIS W IV CONTRAST.      HISTORY: Cervical carcinoma (HCC).     COMPARISON: None.      TECHNIQUE: CT examination of the abdomen and pelvis without IV contrast.   Coronal and sagittal reformations were performed.      Dose reduction techniques were achieved by using automated exposure control   and/or adjustment of mA and/or kV according to patient size and/or use of   iterative reconstruction technique.          TARGETED FINDINGS: There is bilateral prominent hydronephrosis and hydroureter.   The ureters can be followed down to the ureterovesical junction bilaterally   without an obstructing stone.     There is a prominent amount of air in the urinary bladder. There is air in the   vagina. The possibility of a fistulous connection between the vagina and   bladder should be considered. The bladder is mildly distended, rising up to the   superior aspect of L5 vertebral body.     The hydronephrosis and hydroureter would raise the possibility of local   metastatic spread of the patient's known cervical cancer.     Dominant follicle right ovary 2.1 cm diameter without otherwise enlarged   ovaries. There may be a right uterine fundic fibroid up to 3.7 cm in diameter   though this is not definite. No adenopathy.      ROUTINE: No free air. Lung bases are clear. Normal liver, gallbladder,   pancreas, spleen and remainder of the retroperitoneum. Normal bone windows.  Impression: Prominent hydroureter and hydronephrosis to a degree that is consistent with   some level of obstruction. Air in the urinary bladder raises the possibility of   vesicovaginal fistula since there is also air in the vagina.     The hydronephrosis and hydroureter would raise the possibility of local spread   of the patient's cervical carcinoma into the region of the bladder trigone,   though a definite mass is not identified.     Possible small uterine fibroid.      ASSESSMENT/PLAN:  Madalyn Manzano is a 39 y.o. female with history of anxiety, Hep C,  polysubstance abuse, KUNAL-3, found to have stage CORNELIA cervical ca with vesicovaginal fistula and obstructive uropathy s/p bilateral nephrostomy. She also has a PET avid right external iliac lymph node. She started on carbotaxol on 1/15/2025 and completed 2 cycles.     We discussed the role of definitive chemoradiation in the management of locally advanced cervical cancer. We discussed the logistics of therapy including CT simulation followed by 5-6 weeks of daily radiation treatments along with concurrent chemotherapy.     We discussed the use brachytherapy in the treatment of locally advanced cervical cancer to deliver a boost dose once EBRT is completed. We discussed that brachytherapy is a form of internal radiation that uses catheters and needles to deliver sufficiently high doses to the tumor, while minimizing dose to normal organs. We explained she should plan for a 3 day hospital stay.   We discussed that the use of brachytherapy in the treatment of cervical cancer is associated with improvement in overall survival. Furthermore, we reviewed emerging data which shows improvement in overall survival and locoregional control when image-guided brachytherapy is used (in comparison to conventional brachytherapy).     We also discussed the potential acute and late toxicities including, but not limited bowel irritation (loose stools, nausea, possibly vomiting), bladder irritation (urgency, frequency, dysuria, hematuria), skin irritation, fatigue, ovarian failure/sterility in premenopausal women, long-term changes in bowel or bladder function, bowel obstruction, decrease in blood counts and second malignancy.     -s/p induction carbotaxol as per interlace trial  -Continue chemoRT with Dr. Vaca  -Obtain DICOM from Dr. Vaca    Brachytherapy planning  -Discussed with Ms. Manzano that I will be leaving institution next month and her procedure will likely be performed by Dr. Perez.   -RTC week 4-5 of chemoRT to meet  Dr. Perez with MRI pelvis and plan for brachytherapy accordingly  -She has signed consent today for treatment.   -Brachytherapy appt, PAT, EOT MRI, MRI for procedure ordered  -Anticipate she will need an epidural    All of her questions were addressed prior to the conclusion of the consultation.   She was given our contact information and encouraged to call with any questions or concerns at any time.     NCCN Guidelines were applicable to guide this patients treatment plan.

## 2025-04-07 ENCOUNTER — HOSPITAL ENCOUNTER (OUTPATIENT)
Dept: RADIATION ONCOLOGY | Facility: HOSPITAL | Age: 40
Setting detail: RADIATION/ONCOLOGY SERIES
Discharge: HOME | End: 2025-04-07
Payer: COMMERCIAL

## 2025-04-07 VITALS
TEMPERATURE: 97.3 F | RESPIRATION RATE: 18 BRPM | DIASTOLIC BLOOD PRESSURE: 70 MMHG | SYSTOLIC BLOOD PRESSURE: 112 MMHG | BODY MASS INDEX: 26.83 KG/M2 | HEART RATE: 100 BPM | WEIGHT: 142 LBS | OXYGEN SATURATION: 99 %

## 2025-04-07 DIAGNOSIS — C53.9 CERVICAL CANCER, FIGO STAGE IVA: Primary | ICD-10-CM

## 2025-04-07 PROCEDURE — 99205 OFFICE O/P NEW HI 60 MIN: CPT | Performed by: STUDENT IN AN ORGANIZED HEALTH CARE EDUCATION/TRAINING PROGRAM

## 2025-04-07 PROCEDURE — 99215 OFFICE O/P EST HI 40 MIN: CPT | Performed by: STUDENT IN AN ORGANIZED HEALTH CARE EDUCATION/TRAINING PROGRAM

## 2025-04-07 RX ORDER — PRAZOSIN HYDROCHLORIDE 1 MG/1
1 CAPSULE ORAL NIGHTLY
COMMUNITY

## 2025-04-07 RX ORDER — DULOXETIN HYDROCHLORIDE 20 MG/1
20 CAPSULE, DELAYED RELEASE ORAL DAILY
COMMUNITY

## 2025-04-07 RX ORDER — ONDANSETRON HYDROCHLORIDE 8 MG/1
8 TABLET, FILM COATED ORAL EVERY 8 HOURS PRN
COMMUNITY

## 2025-04-07 ASSESSMENT — PATIENT HEALTH QUESTIONNAIRE - PHQ9
SUM OF ALL RESPONSES TO PHQ9 QUESTIONS 1 AND 2: 0
1. LITTLE INTEREST OR PLEASURE IN DOING THINGS: NOT AT ALL
2. FEELING DOWN, DEPRESSED OR HOPELESS: NOT AT ALL

## 2025-04-07 ASSESSMENT — PAIN SCALES - GENERAL: PAINLEVEL_OUTOF10: 2

## 2025-04-07 ASSESSMENT — ENCOUNTER SYMPTOMS
HEMATOLOGIC/LYMPHATIC NEGATIVE: 1
FATIGUE: 1
LOSS OF SENSATION IN FEET: 0
OCCASIONAL FEELINGS OF UNSTEADINESS: 0
NEUROLOGICAL NEGATIVE: 1
MUSCULOSKELETAL NEGATIVE: 1
CARDIOVASCULAR NEGATIVE: 1
GASTROINTESTINAL NEGATIVE: 1
RESPIRATORY NEGATIVE: 1
EYES NEGATIVE: 1
APPETITE CHANGE: 1
PSYCHIATRIC NEGATIVE: 1
ENDOCRINE NEGATIVE: 1

## 2025-04-07 ASSESSMENT — COLUMBIA-SUICIDE SEVERITY RATING SCALE - C-SSRS
2. HAVE YOU ACTUALLY HAD ANY THOUGHTS OF KILLING YOURSELF?: NO
6. HAVE YOU EVER DONE ANYTHING, STARTED TO DO ANYTHING, OR PREPARED TO DO ANYTHING TO END YOUR LIFE?: NO
1. IN THE PAST MONTH, HAVE YOU WISHED YOU WERE DEAD OR WISHED YOU COULD GO TO SLEEP AND NOT WAKE UP?: NO

## 2025-04-07 NOTE — PROGRESS NOTES
Radiation Oncology Nursing Note    Prior Radiotherapy:  Currently receiving external beam RT. First dose 3/25/25, final treatment projected to be 4/29/25.  No radiation treatments to show. (Treatments may have been administered in another system.)     Current Systemic Treatment:  Yes, describe: Weekly Cisplatin with external beam RT.     Presence of Pacemaker or ICD:  No    History of Autoimmune or Connective Tissue Disorders:  No    Pain: The patient's current pain level was assessed.  They report currently having a pain of 2 out of 10.  They feel their pain is under control with the use of pain medications.    Review of Systems:  Review of Systems   Constitutional:  Positive for appetite change and fatigue.   HENT:  Negative.     Eyes: Negative.    Respiratory: Negative.     Cardiovascular: Negative.    Gastrointestinal: Negative.    Endocrine: Negative.    Genitourinary:  Positive for bladder incontinence and pelvic pain.    Musculoskeletal: Negative.    Skin: Negative.    Neurological: Negative.    Hematological: Negative.    Psychiatric/Behavioral: Negative.

## 2025-04-08 ENCOUNTER — HOSPITAL ENCOUNTER (OUTPATIENT)
Dept: INFUSION THERAPY | Age: 40
Discharge: HOME | End: 2025-04-08
Payer: COMMERCIAL

## 2025-04-08 ENCOUNTER — APPOINTMENT (OUTPATIENT)
Dept: RADIATION ONCOLOGY | Facility: HOSPITAL | Age: 40
End: 2025-04-08
Payer: COMMERCIAL

## 2025-04-08 VITALS
RESPIRATION RATE: 16 BRPM | OXYGEN SATURATION: 98 % | HEIGHT: 62 IN | DIASTOLIC BLOOD PRESSURE: 61 MMHG | BODY MASS INDEX: 26.13 KG/M2 | SYSTOLIC BLOOD PRESSURE: 93 MMHG | TEMPERATURE: 97.2 F | WEIGHT: 142 LBS | HEART RATE: 116 BPM

## 2025-04-08 DIAGNOSIS — C53.0 MALIGNANT NEOPLASM OF ENDOCERVIX (HCC): Primary | ICD-10-CM

## 2025-04-08 DIAGNOSIS — C77.5 METASTASIS TO ILIAC LYMPH NODE (HCC): ICD-10-CM

## 2025-04-08 LAB
ALBUMIN SERPL-MCNC: 3.6 G/DL (ref 3.5–5.2)
ALBUMIN/GLOB SERPL: 1.4 {RATIO} (ref 1–2.5)
ALP SERPL-CCNC: 66 U/L (ref 35–104)
ALT SERPL-CCNC: 19 U/L (ref 5–33)
ANION GAP SERPL CALCULATED.3IONS-SCNC: 9 MMOL/L (ref 9–17)
AST SERPL-CCNC: 22 U/L
BASOPHILS # BLD: ABNORMAL K/UL (ref 0–0.2)
BASOPHILS NFR BLD: ABNORMAL % (ref 0–2)
BILIRUB SERPL-MCNC: 0.2 MG/DL (ref 0.3–1.2)
BUN SERPL-MCNC: 5 MG/DL (ref 6–20)
CALCIUM SERPL-MCNC: 9.1 MG/DL (ref 8.6–10.4)
CHLORIDE SERPL-SCNC: 102 MMOL/L (ref 98–107)
CO2 SERPL-SCNC: 27 MMOL/L (ref 20–31)
CREAT SERPL-MCNC: 0.5 MG/DL (ref 0.5–0.9)
EOSINOPHIL # BLD: ABNORMAL K/UL (ref 0–0.4)
EOSINOPHILS RELATIVE PERCENT: ABNORMAL % (ref 0–5)
ERYTHROCYTE [DISTWIDTH] IN BLOOD BY AUTOMATED COUNT: 20.3 % (ref 12.1–15.2)
GFR, ESTIMATED: >90 ML/MIN/1.73M2
GLUCOSE SERPL-MCNC: 87 MG/DL (ref 70–99)
HCT VFR BLD AUTO: 27.5 % (ref 36–46)
HGB BLD-MCNC: 9.2 G/DL (ref 12–16)
IMM GRANULOCYTES # BLD AUTO: ABNORMAL K/UL (ref 0–0.3)
IMM GRANULOCYTES NFR BLD: ABNORMAL %
LYMPHOCYTES NFR BLD: 0.85 K/UL (ref 1–4.8)
LYMPHOCYTES RELATIVE PERCENT: 34 % (ref 15–40)
MAGNESIUM SERPL-MCNC: 1.5 MG/DL (ref 1.6–2.6)
MCH RBC QN AUTO: 36.2 PG (ref 26–34)
MCHC RBC AUTO-ENTMCNC: 33.5 G/DL (ref 31–37)
MCV RBC AUTO: 108.3 FL (ref 80–100)
MONOCYTES NFR BLD: 0.28 K/UL (ref 0–1)
MONOCYTES NFR BLD: 11 % (ref 4–8)
MORPHOLOGY: ABNORMAL
NEUTROPHILS NFR BLD: 55 % (ref 47–75)
NEUTS SEG NFR BLD: 1.37 K/UL (ref 2.5–7)
PLATELET # BLD AUTO: 141 K/UL (ref 140–450)
PMV BLD AUTO: 10.5 FL (ref 6–12)
POTASSIUM SERPL-SCNC: 4.2 MMOL/L (ref 3.7–5.3)
PROT SERPL-MCNC: 6.1 G/DL (ref 6.4–8.3)
RBC # BLD AUTO: 2.54 M/UL (ref 4–5.2)
SODIUM SERPL-SCNC: 138 MMOL/L (ref 135–144)
WBC OTHER # BLD: 2.5 K/UL (ref 3.5–11)

## 2025-04-08 PROCEDURE — 77470 SPECIAL RADIATION TREATMENT: CPT | Performed by: STUDENT IN AN ORGANIZED HEALTH CARE EDUCATION/TRAINING PROGRAM

## 2025-04-08 PROCEDURE — 83735 ASSAY OF MAGNESIUM: CPT

## 2025-04-08 PROCEDURE — 96361 HYDRATE IV INFUSION ADD-ON: CPT

## 2025-04-08 PROCEDURE — 77263 THER RADIOLOGY TX PLNG CPLX: CPT | Performed by: STUDENT IN AN ORGANIZED HEALTH CARE EDUCATION/TRAINING PROGRAM

## 2025-04-08 PROCEDURE — 6360000002 HC RX W HCPCS: Performed by: INTERNAL MEDICINE

## 2025-04-08 PROCEDURE — 96375 TX/PRO/DX INJ NEW DRUG ADDON: CPT

## 2025-04-08 PROCEDURE — 2580000003 HC RX 258: Performed by: INTERNAL MEDICINE

## 2025-04-08 PROCEDURE — 80053 COMPREHEN METABOLIC PANEL: CPT

## 2025-04-08 PROCEDURE — 2500000003 HC RX 250 WO HCPCS: Performed by: INTERNAL MEDICINE

## 2025-04-08 PROCEDURE — 96413 CHEMO IV INFUSION 1 HR: CPT

## 2025-04-08 PROCEDURE — 85025 COMPLETE CBC W/AUTO DIFF WBC: CPT

## 2025-04-08 RX ORDER — PRAZOSIN HYDROCHLORIDE 1 MG/1
1 CAPSULE ORAL NIGHTLY
COMMUNITY
Start: 2025-04-02

## 2025-04-08 RX ORDER — SODIUM CHLORIDE 0.9 % (FLUSH) 0.9 %
5-40 SYRINGE (ML) INJECTION PRN
Status: DISCONTINUED | OUTPATIENT
Start: 2025-04-08 | End: 2025-04-09 | Stop reason: HOSPADM

## 2025-04-08 RX ORDER — DEXAMETHASONE SODIUM PHOSPHATE 10 MG/ML
10 INJECTION, SOLUTION INTRAMUSCULAR; INTRAVENOUS ONCE
Status: COMPLETED | OUTPATIENT
Start: 2025-04-08 | End: 2025-04-08

## 2025-04-08 RX ORDER — HEPARIN 100 UNIT/ML
500 SYRINGE INTRAVENOUS PRN
Status: DISCONTINUED | OUTPATIENT
Start: 2025-04-08 | End: 2025-04-09 | Stop reason: HOSPADM

## 2025-04-08 RX ORDER — SODIUM CHLORIDE 9 MG/ML
5-250 INJECTION, SOLUTION INTRAVENOUS PRN
Status: DISCONTINUED | OUTPATIENT
Start: 2025-04-08 | End: 2025-04-09 | Stop reason: HOSPADM

## 2025-04-08 RX ORDER — PALONOSETRON 0.05 MG/ML
0.25 INJECTION, SOLUTION INTRAVENOUS ONCE
Status: COMPLETED | OUTPATIENT
Start: 2025-04-08 | End: 2025-04-08

## 2025-04-08 RX ADMIN — SODIUM CHLORIDE 150 MG: 9 INJECTION, SOLUTION INTRAVENOUS at 10:49

## 2025-04-08 RX ADMIN — SODIUM CHLORIDE 100 ML/HR: 0.9 INJECTION, SOLUTION INTRAVENOUS at 10:49

## 2025-04-08 RX ADMIN — POTASSIUM CHLORIDE: 2 INJECTION, SOLUTION, CONCENTRATE INTRAVENOUS at 12:39

## 2025-04-08 RX ADMIN — PALONOSETRON 0.25 MG: 0.05 INJECTION, SOLUTION INTRAVENOUS at 10:41

## 2025-04-08 RX ADMIN — CISPLATIN 68 MG: 1 INJECTION, SOLUTION INTRAVENOUS at 11:29

## 2025-04-08 RX ADMIN — POTASSIUM CHLORIDE: 2 INJECTION, SOLUTION, CONCENTRATE INTRAVENOUS at 09:37

## 2025-04-08 RX ADMIN — SODIUM CHLORIDE, PRESERVATIVE FREE 20 ML: 5 INJECTION INTRAVENOUS at 13:42

## 2025-04-08 RX ADMIN — HEPARIN 500 UNITS: 100 SYRINGE at 13:42

## 2025-04-08 RX ADMIN — DEXAMETHASONE SODIUM PHOSPHATE 10 MG: 10 INJECTION, SOLUTION INTRAMUSCULAR; INTRAVENOUS at 10:43

## 2025-04-08 ASSESSMENT — PAIN DESCRIPTION - DESCRIPTORS: DESCRIPTORS: ACHING

## 2025-04-08 ASSESSMENT — PAIN DESCRIPTION - ORIENTATION: ORIENTATION: LEFT

## 2025-04-08 ASSESSMENT — PAIN - FUNCTIONAL ASSESSMENT: PAIN_FUNCTIONAL_ASSESSMENT: ACTIVITIES ARE NOT PREVENTED

## 2025-04-08 ASSESSMENT — PAIN SCALES - GENERAL: PAINLEVEL_OUTOF10: 2

## 2025-04-08 ASSESSMENT — PAIN DESCRIPTION - ONSET: ONSET: ON-GOING

## 2025-04-08 ASSESSMENT — PAIN DESCRIPTION - FREQUENCY: FREQUENCY: CONTINUOUS

## 2025-04-08 ASSESSMENT — PAIN DESCRIPTION - LOCATION: LOCATION: PELVIS

## 2025-04-08 ASSESSMENT — PAIN DESCRIPTION - PAIN TYPE: TYPE: CHRONIC PAIN

## 2025-04-08 NOTE — ADDENDUM NOTE
Encounter addended by: Charlee Harrington RN on: 4/8/2025 12:04 PM   Actions taken: Clinical Note Signed

## 2025-04-08 NOTE — PROGRESS NOTES
Learner: patient  Educated on: Interstitial brachytherapy  Readiness: acceptance  Preferred learning method: preferred: reading and listening  Method used: handout  Response: demonstrated understanding  Barriers: None  Preferred language: English  Resources given: Interstitial brachytherapy PI.    Reviewed to expect call from Brachytherapy coordinator to set up pertinent scans, fuv to prepare for brachytherapy.

## 2025-04-08 NOTE — PLAN OF CARE
Problem: Pain  Goal: Verbalizes/displays adequate comfort level or baseline comfort level  Outcome: Progressing  Flowsheets (Taken 4/8/2025 1652)  Verbalizes/displays adequate comfort level or baseline comfort level:   Encourage patient to monitor pain and request assistance   Assess pain using appropriate pain scale   Implement non-pharmacological measures as appropriate and evaluate response   Notify Licensed Independent Practitioner if interventions unsuccessful or patient reports new pain   Consider cultural and social influences on pain and pain management

## 2025-04-08 NOTE — PROGRESS NOTES
Critical WBC 2.5 called to Emily Liriano. Also reported Neutrophils Absolute 1.37, which is outside the parameters to treat. Awaiting response from Oncologist.

## 2025-04-09 NOTE — PROGRESS NOTES
Dr. Estevez wishes to proceed with scheduled treatment, but did adjust chemotherapy dose. It is reflected in plan at this time.

## 2025-04-15 ENCOUNTER — HOSPITAL ENCOUNTER (OUTPATIENT)
Dept: INFUSION THERAPY | Age: 40
Discharge: HOME | End: 2025-04-15
Payer: COMMERCIAL

## 2025-04-15 VITALS
HEIGHT: 62 IN | TEMPERATURE: 98.7 F | SYSTOLIC BLOOD PRESSURE: 90 MMHG | WEIGHT: 141.1 LBS | BODY MASS INDEX: 25.96 KG/M2 | HEART RATE: 86 BPM | DIASTOLIC BLOOD PRESSURE: 62 MMHG | OXYGEN SATURATION: 94 % | RESPIRATION RATE: 15 BRPM

## 2025-04-15 DIAGNOSIS — C77.5 METASTASIS TO ILIAC LYMPH NODE (HCC): ICD-10-CM

## 2025-04-15 DIAGNOSIS — C77.5 METASTASIS TO ILIAC LYMPH NODE (HCC): Primary | ICD-10-CM

## 2025-04-15 DIAGNOSIS — C53.0 MALIGNANT NEOPLASM OF ENDOCERVIX (HCC): Primary | ICD-10-CM

## 2025-04-15 LAB
ALBUMIN SERPL-MCNC: 3.4 G/DL (ref 3.5–5.2)
ALBUMIN/GLOB SERPL: 1.3 {RATIO} (ref 1–2.5)
ALP SERPL-CCNC: 66 U/L (ref 35–104)
ALT SERPL-CCNC: 22 U/L (ref 5–33)
ANION GAP SERPL CALCULATED.3IONS-SCNC: 10 MMOL/L (ref 9–17)
AST SERPL-CCNC: 26 U/L
BASOPHILS # BLD: ABNORMAL K/UL (ref 0–0.2)
BASOPHILS NFR BLD: ABNORMAL % (ref 0–2)
BILIRUB SERPL-MCNC: 0.4 MG/DL (ref 0.3–1.2)
BUN SERPL-MCNC: 3 MG/DL (ref 6–20)
CALCIUM SERPL-MCNC: 8.7 MG/DL (ref 8.6–10.4)
CHLORIDE SERPL-SCNC: 102 MMOL/L (ref 98–107)
CO2 SERPL-SCNC: 25 MMOL/L (ref 20–31)
CREAT SERPL-MCNC: 0.5 MG/DL (ref 0.5–0.9)
EOSINOPHIL # BLD: ABNORMAL K/UL (ref 0–0.4)
EOSINOPHILS RELATIVE PERCENT: ABNORMAL % (ref 0–5)
ERYTHROCYTE [DISTWIDTH] IN BLOOD BY AUTOMATED COUNT: 19.6 % (ref 12.1–15.2)
GFR, ESTIMATED: >90 ML/MIN/1.73M2
GLUCOSE SERPL-MCNC: 90 MG/DL (ref 70–99)
HCT VFR BLD AUTO: 26.2 % (ref 36–46)
HGB BLD-MCNC: 9 G/DL (ref 12–16)
IMM GRANULOCYTES # BLD AUTO: ABNORMAL K/UL (ref 0–0.3)
IMM GRANULOCYTES NFR BLD: ABNORMAL %
LYMPHOCYTES NFR BLD: 0.91 K/UL (ref 1–4.8)
LYMPHOCYTES RELATIVE PERCENT: 38 % (ref 15–40)
MAGNESIUM SERPL-MCNC: 1.5 MG/DL (ref 1.6–2.6)
MCH RBC QN AUTO: 37.7 PG (ref 26–34)
MCHC RBC AUTO-ENTMCNC: 34.4 G/DL (ref 31–37)
MCV RBC AUTO: 109.6 FL (ref 80–100)
MONOCYTES NFR BLD: 0.36 K/UL (ref 0–1)
MONOCYTES NFR BLD: 15 % (ref 4–8)
MORPHOLOGY: ABNORMAL
NEUTROPHILS NFR BLD: 47 % (ref 47–75)
NEUTS SEG NFR BLD: 1.13 K/UL (ref 2.5–7)
PLATELET # BLD AUTO: 167 K/UL (ref 140–450)
PMV BLD AUTO: 10.4 FL (ref 6–12)
POTASSIUM SERPL-SCNC: 3.7 MMOL/L (ref 3.7–5.3)
PROT SERPL-MCNC: 6 G/DL (ref 6.4–8.3)
RBC # BLD AUTO: 2.39 M/UL (ref 4–5.2)
SODIUM SERPL-SCNC: 137 MMOL/L (ref 135–144)
WBC OTHER # BLD: 2.4 K/UL (ref 3.5–11)

## 2025-04-15 PROCEDURE — 96374 THER/PROPH/DIAG INJ IV PUSH: CPT

## 2025-04-15 PROCEDURE — 2580000003 HC RX 258: Performed by: INTERNAL MEDICINE

## 2025-04-15 PROCEDURE — 85025 COMPLETE CBC W/AUTO DIFF WBC: CPT

## 2025-04-15 PROCEDURE — 96375 TX/PRO/DX INJ NEW DRUG ADDON: CPT

## 2025-04-15 PROCEDURE — 96366 THER/PROPH/DIAG IV INF ADDON: CPT

## 2025-04-15 PROCEDURE — 96367 TX/PROPH/DG ADDL SEQ IV INF: CPT

## 2025-04-15 PROCEDURE — 96365 THER/PROPH/DIAG IV INF INIT: CPT

## 2025-04-15 PROCEDURE — 6360000002 HC RX W HCPCS: Performed by: INTERNAL MEDICINE

## 2025-04-15 PROCEDURE — 96413 CHEMO IV INFUSION 1 HR: CPT

## 2025-04-15 PROCEDURE — 80053 COMPREHEN METABOLIC PANEL: CPT

## 2025-04-15 PROCEDURE — 83735 ASSAY OF MAGNESIUM: CPT

## 2025-04-15 RX ORDER — SODIUM CHLORIDE 9 MG/ML
5-250 INJECTION, SOLUTION INTRAVENOUS PRN
Status: DISCONTINUED | OUTPATIENT
Start: 2025-04-15 | End: 2025-04-16 | Stop reason: HOSPADM

## 2025-04-15 RX ORDER — HEPARIN 100 UNIT/ML
500 SYRINGE INTRAVENOUS PRN
Status: DISCONTINUED | OUTPATIENT
Start: 2025-04-15 | End: 2025-04-16 | Stop reason: HOSPADM

## 2025-04-15 RX ORDER — PALONOSETRON 0.05 MG/ML
0.25 INJECTION, SOLUTION INTRAVENOUS ONCE
Status: COMPLETED | OUTPATIENT
Start: 2025-04-15 | End: 2025-04-15

## 2025-04-15 RX ORDER — DEXAMETHASONE SODIUM PHOSPHATE 10 MG/ML
10 INJECTION, SOLUTION INTRAMUSCULAR; INTRAVENOUS ONCE
Status: COMPLETED | OUTPATIENT
Start: 2025-04-15 | End: 2025-04-15

## 2025-04-15 RX ORDER — LORAZEPAM 1 MG/1
1 TABLET ORAL EVERY 8 HOURS PRN
Qty: 90 TABLET | Refills: 0 | Status: SHIPPED | OUTPATIENT
Start: 2025-04-15 | End: 2025-05-15

## 2025-04-15 RX ORDER — PROCHLORPERAZINE EDISYLATE 5 MG/ML
5 INJECTION INTRAMUSCULAR; INTRAVENOUS
Status: DISCONTINUED | OUTPATIENT
Start: 2025-04-15 | End: 2025-04-16 | Stop reason: HOSPADM

## 2025-04-15 RX ADMIN — CISPLATIN 68 MG: 1 INJECTION, SOLUTION INTRAVENOUS at 11:07

## 2025-04-15 RX ADMIN — HEPARIN 500 UNITS: 100 SYRINGE at 13:21

## 2025-04-15 RX ADMIN — SODIUM CHLORIDE 100 ML/HR: 0.9 INJECTION, SOLUTION INTRAVENOUS at 08:52

## 2025-04-15 RX ADMIN — DEXAMETHASONE SODIUM PHOSPHATE 10 MG: 10 INJECTION, SOLUTION INTRAMUSCULAR; INTRAVENOUS at 10:51

## 2025-04-15 RX ADMIN — PALONOSETRON 0.25 MG: 0.05 INJECTION, SOLUTION INTRAVENOUS at 09:44

## 2025-04-15 RX ADMIN — POTASSIUM CHLORIDE: 2 INJECTION, SOLUTION, CONCENTRATE INTRAVENOUS at 09:46

## 2025-04-15 RX ADMIN — POTASSIUM CHLORIDE: 2 INJECTION, SOLUTION, CONCENTRATE INTRAVENOUS at 12:15

## 2025-04-15 RX ADMIN — SODIUM CHLORIDE 150 MG: 9 INJECTION, SOLUTION INTRAVENOUS at 09:19

## 2025-04-15 NOTE — PROGRESS NOTES
Patient arrives for treatment. Ambulates to room 201 with steady gait. States she has not been feeling well with a marked increase in nausea. Denies loss of appetite, but states yesterday she threw up the small amount of food she ate. Takes Zofran from home meds. Will allow time for Zofran to work. Discussed with patient that she has Compazine available if needed.

## 2025-04-15 NOTE — PROGRESS NOTES
Spiritual Services Interventions  Room/bed info not found   4/15/2025  Sr Samantha Llanes  39 y.o. year old female    Encounter Summary  Encounter Overview/Reason: Initial Encounter  Service Provided For: Patient  Referral/Consult From: Larry  Last Encounter : 04/15/25  Complexity of Encounter: Moderate  Begin Time: 0910  End Time : 0920  Total Time Calculated: 10 min     Spiritual/Emotional needs  Type: Spiritual Support                    Assessment/Intervention/Outcome  Assessment: Coping  Intervention: Sustaining Presence/Ministry of presence, Prayer (assurance of)/[employer]  Outcome: Expressed Gratitude

## 2025-04-15 NOTE — PROGRESS NOTES
Contacted Dr. Estevez via KEVIN Herbert RN regarding lab results. Will check with Dr. Estevez about whether to treat today.

## 2025-04-15 NOTE — PROGRESS NOTES
Message received to treat. Will add Ativan po prn nausea. Dr. Estevez to send rx to [facility].

## 2025-04-21 ENCOUNTER — TELEMEDICINE (OUTPATIENT)
Dept: ONCOLOGY | Age: 40
End: 2025-04-21
Payer: COMMERCIAL

## 2025-04-21 DIAGNOSIS — C77.5 METASTASIS TO ILIAC LYMPH NODE (HCC): ICD-10-CM

## 2025-04-21 DIAGNOSIS — T45.1X5D ADVERSE EFFECT OF CHEMOTHERAPY, SUBSEQUENT ENCOUNTER: ICD-10-CM

## 2025-04-21 DIAGNOSIS — D75.89 MYELOSUPPRESSION AFTER CHEMOTHERAPY: ICD-10-CM

## 2025-04-21 DIAGNOSIS — T45.1X5A MYELOSUPPRESSION AFTER CHEMOTHERAPY: ICD-10-CM

## 2025-04-21 DIAGNOSIS — C53.0 MALIGNANT NEOPLASM OF ENDOCERVIX (HCC): Primary | ICD-10-CM

## 2025-04-21 PROCEDURE — 99214 OFFICE O/P EST MOD 30 MIN: CPT | Performed by: INTERNAL MEDICINE

## 2025-04-21 NOTE — PROGRESS NOTES
was evaluated through a synchronous (real-time) audio-video encounter. The patient (or guardian if applicable) is aware that this is a billable service, which includes applicable co-pays. This Virtual Visit was conducted with patient's (and/or legal guardian's) consent. Patient identification was verified, and a caregiver was present when appropriate.   The patient was located at Facility (Appt Department): [place] [facility]   [facility] [place] [facility]        Total time spent for this encounter: Not billed by time  An electronic signature was used to authenticate this note.          Patient ID: Nola Llanes, 1985, [id number], 39 y.o.  Referred by :  No ref. provider found   Reason for consultation: cervical cancer,         Oncology history  Locally advanced cervical cancer with right external iliac lymphadenopathy      Oncology treatment  Induction chemotherapy CarboTaxol weekly for 6-week started January 16, 2025 and done on February 20/27 2025  concurrent chemo RT after induction chemotherapy started March 25/2025      HISTORY OF PRESENT ILLNESS:    Oncologic History:    Nola Llanes is a very pleasant 39 y.o. female.  Who was seen initially by GYN for NATHAN-3 and found to have advanced cervical cancer with fistula with obstructive uropathy status post bilateral nephrostomy tube placement at this time MRI PET/CT pending she was seen by GYN oncology and plan to proceed with concurrent chemo RT   Pathology as below      11/26/24 0000    Surgical Pathology Report Path Number: [id number]    -- Diagnosis --  CERVIX, BIOPSY:  - HIGH-GRADE SQUAMOUS INTRAEPITHELIAL LESION (NATHAN 3).  SEE COMMENT.   COMMENT: IMMUNOSTAIN FOR p16 (CONTROL APPROPRIATE) DEMONSTRATES A   POSITIVE STAINING PATTERN IN THE SQUAMOUS EPITHELIUM CONSISTENT WITH A   HIGH-GRADE SQUAMOUS INTRAEPITHELIAL LESION (NATHAN 3).  FOCAL INVOLVEMENT   OF ENDOCERVICAL EPITHELIUM BY THE HIGH-GRADE

## 2025-04-22 ENCOUNTER — HOSPITAL ENCOUNTER (OUTPATIENT)
Dept: INFUSION THERAPY | Age: 40
Discharge: HOME | End: 2025-04-22
Payer: COMMERCIAL

## 2025-04-22 VITALS
DIASTOLIC BLOOD PRESSURE: 61 MMHG | OXYGEN SATURATION: 94 % | HEART RATE: 100 BPM | RESPIRATION RATE: 16 BRPM | TEMPERATURE: 98.9 F | SYSTOLIC BLOOD PRESSURE: 109 MMHG

## 2025-04-22 DIAGNOSIS — C77.5 METASTASIS TO ILIAC LYMPH NODE (HCC): ICD-10-CM

## 2025-04-22 DIAGNOSIS — C53.0 MALIGNANT NEOPLASM OF ENDOCERVIX (HCC): Primary | ICD-10-CM

## 2025-04-22 LAB
ALBUMIN SERPL-MCNC: 3.4 G/DL (ref 3.5–5.2)
ALBUMIN/GLOB SERPL: 1.4 {RATIO} (ref 1–2.5)
ALP SERPL-CCNC: 66 U/L (ref 35–104)
ALT SERPL-CCNC: 17 U/L (ref 5–33)
ANION GAP SERPL CALCULATED.3IONS-SCNC: 11 MMOL/L (ref 9–17)
AST SERPL-CCNC: 19 U/L
BASOPHILS # BLD: 0.01 K/UL (ref 0–0.2)
BASOPHILS NFR BLD: 0 % (ref 0–2)
BILIRUB SERPL-MCNC: <0.2 MG/DL (ref 0.3–1.2)
BUN SERPL-MCNC: 5 MG/DL (ref 6–20)
CALCIUM SERPL-MCNC: 8.6 MG/DL (ref 8.6–10.4)
CHLORIDE SERPL-SCNC: 106 MMOL/L (ref 98–107)
CO2 SERPL-SCNC: 24 MMOL/L (ref 20–31)
CREAT SERPL-MCNC: 0.6 MG/DL (ref 0.5–0.9)
EOSINOPHIL # BLD: 0 K/UL (ref 0–0.4)
EOSINOPHILS RELATIVE PERCENT: 0 % (ref 0–5)
ERYTHROCYTE [DISTWIDTH] IN BLOOD BY AUTOMATED COUNT: 18.7 % (ref 12.1–15.2)
GFR, ESTIMATED: >90 ML/MIN/1.73M2
GLUCOSE SERPL-MCNC: 96 MG/DL (ref 70–99)
HCT VFR BLD AUTO: 25.9 % (ref 36–46)
HGB BLD-MCNC: 8.8 G/DL (ref 12–16)
IMM GRANULOCYTES # BLD AUTO: 0.04 K/UL (ref 0–0.3)
IMM GRANULOCYTES NFR BLD: 1 % (ref 0–5)
LYMPHOCYTES NFR BLD: 0.67 K/UL (ref 1–4.8)
LYMPHOCYTES RELATIVE PERCENT: 19 % (ref 15–40)
MAGNESIUM SERPL-MCNC: 1.5 MG/DL (ref 1.6–2.6)
MCH RBC QN AUTO: 37.9 PG (ref 26–34)
MCHC RBC AUTO-ENTMCNC: 34 G/DL (ref 31–37)
MCV RBC AUTO: 111.6 FL (ref 80–100)
MONOCYTES NFR BLD: 0.37 K/UL (ref 0–1)
MONOCYTES NFR BLD: 10 % (ref 4–8)
MORPHOLOGY: ABNORMAL
NEUTROPHILS NFR BLD: 70 % (ref 47–75)
NEUTS SEG NFR BLD: 2.52 K/UL (ref 2.5–7)
PLATELET # BLD AUTO: 72 K/UL (ref 140–450)
PMV BLD AUTO: 9.9 FL (ref 6–12)
POTASSIUM SERPL-SCNC: 4.4 MMOL/L (ref 3.7–5.3)
PROT SERPL-MCNC: 5.8 G/DL (ref 6.4–8.3)
RBC # BLD AUTO: 2.32 M/UL (ref 4–5.2)
SODIUM SERPL-SCNC: 141 MMOL/L (ref 135–144)
WBC OTHER # BLD: 3.6 K/UL (ref 3.5–11)

## 2025-04-22 PROCEDURE — 2500000003 HC RX 250 WO HCPCS: Performed by: INTERNAL MEDICINE

## 2025-04-22 PROCEDURE — 96361 HYDRATE IV INFUSION ADD-ON: CPT

## 2025-04-22 PROCEDURE — 80053 COMPREHEN METABOLIC PANEL: CPT

## 2025-04-22 PROCEDURE — 96413 CHEMO IV INFUSION 1 HR: CPT

## 2025-04-22 PROCEDURE — 85025 COMPLETE CBC W/AUTO DIFF WBC: CPT

## 2025-04-22 PROCEDURE — 6360000002 HC RX W HCPCS: Performed by: INTERNAL MEDICINE

## 2025-04-22 PROCEDURE — 96367 TX/PROPH/DG ADDL SEQ IV INF: CPT

## 2025-04-22 PROCEDURE — 2580000003 HC RX 258: Performed by: INTERNAL MEDICINE

## 2025-04-22 PROCEDURE — 96368 THER/DIAG CONCURRENT INF: CPT

## 2025-04-22 PROCEDURE — 96375 TX/PRO/DX INJ NEW DRUG ADDON: CPT

## 2025-04-22 PROCEDURE — 83735 ASSAY OF MAGNESIUM: CPT

## 2025-04-22 RX ORDER — DEXAMETHASONE SODIUM PHOSPHATE 10 MG/ML
10 INJECTION, SOLUTION INTRAMUSCULAR; INTRAVENOUS ONCE
Status: COMPLETED | OUTPATIENT
Start: 2025-04-22 | End: 2025-04-22

## 2025-04-22 RX ORDER — SODIUM CHLORIDE 9 MG/ML
5-250 INJECTION, SOLUTION INTRAVENOUS PRN
Status: DISCONTINUED | OUTPATIENT
Start: 2025-04-22 | End: 2025-04-23 | Stop reason: HOSPADM

## 2025-04-22 RX ORDER — PALONOSETRON 0.05 MG/ML
0.25 INJECTION, SOLUTION INTRAVENOUS ONCE
Status: COMPLETED | OUTPATIENT
Start: 2025-04-22 | End: 2025-04-22

## 2025-04-22 RX ORDER — OLANZAPINE 2.5 MG/1
2.5 TABLET, FILM COATED ORAL NIGHTLY
COMMUNITY
Start: 2025-04-15

## 2025-04-22 RX ORDER — SODIUM CHLORIDE 0.9 % (FLUSH) 0.9 %
5-40 SYRINGE (ML) INJECTION PRN
Status: DISCONTINUED | OUTPATIENT
Start: 2025-04-22 | End: 2025-04-23 | Stop reason: HOSPADM

## 2025-04-22 RX ORDER — LEVOFLOXACIN 750 MG/1
750 TABLET, FILM COATED ORAL DAILY
COMMUNITY
Start: 2025-04-16

## 2025-04-22 RX ORDER — HEPARIN 100 UNIT/ML
500 SYRINGE INTRAVENOUS PRN
Status: DISCONTINUED | OUTPATIENT
Start: 2025-04-22 | End: 2025-04-23 | Stop reason: HOSPADM

## 2025-04-22 RX ADMIN — SODIUM CHLORIDE 150 MG: 9 INJECTION, SOLUTION INTRAVENOUS at 11:20

## 2025-04-22 RX ADMIN — DEXAMETHASONE SODIUM PHOSPHATE 10 MG: 10 INJECTION, SOLUTION INTRAMUSCULAR; INTRAVENOUS at 11:16

## 2025-04-22 RX ADMIN — PALONOSETRON 0.25 MG: 0.05 INJECTION, SOLUTION INTRAVENOUS at 11:13

## 2025-04-22 RX ADMIN — POTASSIUM CHLORIDE: 2 INJECTION, SOLUTION, CONCENTRATE INTRAVENOUS at 12:57

## 2025-04-22 RX ADMIN — CISPLATIN 68 MG: 1 INJECTION, SOLUTION INTRAVENOUS at 11:48

## 2025-04-22 RX ADMIN — HEPARIN 500 UNITS: 100 SYRINGE at 14:02

## 2025-04-22 RX ADMIN — SODIUM CHLORIDE 100 ML/HR: 0.9 INJECTION, SOLUTION INTRAVENOUS at 11:13

## 2025-04-22 RX ADMIN — POTASSIUM CHLORIDE: 2 INJECTION, SOLUTION, CONCENTRATE INTRAVENOUS at 09:59

## 2025-04-22 RX ADMIN — SODIUM CHLORIDE, PRESERVATIVE FREE 20 ML: 5 INJECTION INTRAVENOUS at 14:02

## 2025-04-22 RX ADMIN — SODIUM CHLORIDE 100 ML/HR: 0.9 INJECTION, SOLUTION INTRAVENOUS at 12:55

## 2025-04-22 ASSESSMENT — PAIN SCALES - GENERAL: PAINLEVEL_OUTOF10: 2

## 2025-04-22 ASSESSMENT — PAIN DESCRIPTION - LOCATION: LOCATION: PELVIS;FLANK

## 2025-04-22 ASSESSMENT — PAIN DESCRIPTION - DESCRIPTORS: DESCRIPTORS: ACHING

## 2025-04-22 ASSESSMENT — PAIN DESCRIPTION - ONSET: ONSET: ON-GOING

## 2025-04-22 ASSESSMENT — PAIN - FUNCTIONAL ASSESSMENT: PAIN_FUNCTIONAL_ASSESSMENT: ACTIVITIES ARE NOT PREVENTED

## 2025-04-22 ASSESSMENT — PAIN DESCRIPTION - PAIN TYPE: TYPE: CHRONIC PAIN

## 2025-04-22 ASSESSMENT — PAIN DESCRIPTION - FREQUENCY: FREQUENCY: CONTINUOUS

## 2025-04-22 ASSESSMENT — PAIN DESCRIPTION - ORIENTATION: ORIENTATION: LEFT

## 2025-04-22 NOTE — PROGRESS NOTES
Dr. Estevez made aware of abnormal lab of platelets of 72. Tx plan states to hold tx if under 100. Per Dr. Estevez, continue with scheduled tx and hydration.

## 2025-04-22 NOTE — PLAN OF CARE
Problem: Pain  Goal: Verbalizes/displays adequate comfort level or baseline comfort level  Outcome: Progressing  Flowsheets (Taken 4/22/2025 4980)  Verbalizes/displays adequate comfort level or baseline comfort level:   Assess pain using appropriate pain scale   Encourage patient to monitor pain and request assistance

## 2025-04-28 ENCOUNTER — HOSPITAL ENCOUNTER (OUTPATIENT)
Dept: RADIOLOGY | Facility: HOSPITAL | Age: 40
Discharge: HOME | End: 2025-04-28
Payer: COMMERCIAL

## 2025-04-28 ENCOUNTER — OFFICE VISIT (OUTPATIENT)
Dept: GYNECOLOGIC ONCOLOGY | Facility: CLINIC | Age: 40
End: 2025-04-28
Payer: COMMERCIAL

## 2025-04-28 ENCOUNTER — PREP FOR PROCEDURE (OUTPATIENT)
Dept: RADIOLOGY | Facility: HOSPITAL | Age: 40
End: 2025-04-28

## 2025-04-28 ENCOUNTER — CLINICAL SUPPORT (OUTPATIENT)
Dept: PREADMISSION TESTING | Facility: HOSPITAL | Age: 40
End: 2025-04-28
Payer: COMMERCIAL

## 2025-04-28 VITALS
SYSTOLIC BLOOD PRESSURE: 97 MMHG | OXYGEN SATURATION: 100 % | HEART RATE: 92 BPM | TEMPERATURE: 97.3 F | WEIGHT: 142.64 LBS | RESPIRATION RATE: 16 BRPM | DIASTOLIC BLOOD PRESSURE: 59 MMHG | BODY MASS INDEX: 26.95 KG/M2

## 2025-04-28 VITALS — SYSTOLIC BLOOD PRESSURE: 97 MMHG | DIASTOLIC BLOOD PRESSURE: 53 MMHG

## 2025-04-28 DIAGNOSIS — C53.9 CERVICAL CANCER, FIGO STAGE IVA: Primary | ICD-10-CM

## 2025-04-28 DIAGNOSIS — C53.9 CERVICAL CANCER, FIGO STAGE IVA: ICD-10-CM

## 2025-04-28 PROCEDURE — 99213 OFFICE O/P EST LOW 20 MIN: CPT | Mod: 25 | Performed by: OBSTETRICS & GYNECOLOGY

## 2025-04-28 PROCEDURE — 99213 OFFICE O/P EST LOW 20 MIN: CPT | Performed by: OBSTETRICS & GYNECOLOGY

## 2025-04-28 PROCEDURE — 72197 MRI PELVIS W/O & W/DYE: CPT

## 2025-04-28 PROCEDURE — 96372 THER/PROPH/DIAG INJ SC/IM: CPT | Mod: 59 | Performed by: STUDENT IN AN ORGANIZED HEALTH CARE EDUCATION/TRAINING PROGRAM

## 2025-04-28 PROCEDURE — 2500000004 HC RX 250 GENERAL PHARMACY W/ HCPCS (ALT 636 FOR OP/ED): Mod: JZ,TB | Performed by: STUDENT IN AN ORGANIZED HEALTH CARE EDUCATION/TRAINING PROGRAM

## 2025-04-28 PROCEDURE — 2550000001 HC RX 255 CONTRASTS: Mod: JZ | Performed by: STUDENT IN AN ORGANIZED HEALTH CARE EDUCATION/TRAINING PROGRAM

## 2025-04-28 PROCEDURE — 2500000004 HC RX 250 GENERAL PHARMACY W/ HCPCS (ALT 636 FOR OP/ED): Mod: JZ | Performed by: STUDENT IN AN ORGANIZED HEALTH CARE EDUCATION/TRAINING PROGRAM

## 2025-04-28 PROCEDURE — A9575 INJ GADOTERATE MEGLUMI 0.1ML: HCPCS | Mod: JZ | Performed by: STUDENT IN AN ORGANIZED HEALTH CARE EDUCATION/TRAINING PROGRAM

## 2025-04-28 RX ORDER — GADOTERATE MEGLUMINE 376.9 MG/ML
13 INJECTION INTRAVENOUS
Status: COMPLETED | OUTPATIENT
Start: 2025-04-28 | End: 2025-04-28

## 2025-04-28 RX ORDER — HEPARIN 100 UNIT/ML
5 SYRINGE INTRAVENOUS AS NEEDED
Status: COMPLETED | OUTPATIENT
Start: 2025-04-28 | End: 2025-04-28

## 2025-04-28 RX ADMIN — GADOTERATE MEGLUMINE 13 ML: 376.9 INJECTION INTRAVENOUS at 15:46

## 2025-04-28 RX ADMIN — HEPARIN 500 UNITS: 100 SYRINGE at 15:59

## 2025-04-28 RX ADMIN — GLUCAGON 1 MG: KIT at 14:50

## 2025-04-28 ASSESSMENT — PAIN SCALES - GENERAL: PAINLEVEL_OUTOF10: 0-NO PAIN

## 2025-04-28 NOTE — PROGRESS NOTES
Patient ID: Madalyn Manzano is a 39 y.o. female.  Referring Physician: No referring provider defined for this encounter.  Primary Care Provider: Tyler Hernandez, APRN-CNP    Subjective    Referred by Dr. Can (gyn onc)    38 yo with locally advanced cervical cancer    Being treated locally ([facility]) with chemo/RT    Cancer history  Diagnosed 2024 Stage Katelynn with vesicovaginal fistula and iliac node, being treated via INTERLACE protocol, weekly induction carbo/taxol, followed by cis/EBRT, then brachytherapy    Ob/gyn hx  . 5x     PMH  None    PSH  Bilateral nephrotomy tubes secondary to tumor/vesicovaginal fistula    SH  Lives with her children and grandchild and is employed as  for a truck part .  Denies illicit drug use. Reports a 1/2 pack day tobacco use and occasional alcohol use.    FH  Otherwise no family history of breast, uterine, ovarian or colon cancer.   She has just completed external beam radiation.  Having MRI today to plan for brachytherapy    Continuing to have leakage from the vagina of urine  Having some left sided back pain    No fever          Objective    BSA: 1.67 meters squared  BP 97/59 (BP Location: Right arm, Patient Position: Sitting, BP Cuff Size: Adult)   Pulse 92   Temp 36.3 °C (97.3 °F) (Temporal)   Resp 16   Wt 64.7 kg (142 lb 10.2 oz)   SpO2 100%   BMI 26.95 kg/m²      Physical Exam  Vitals and nursing note reviewed.   Constitutional:       Appearance: Normal appearance.   HENT:      Head: Normocephalic.   Eyes:      Pupils: Pupils are equal, round, and reactive to light.   Cardiovascular:      Rate and Rhythm: Normal rate and regular rhythm.   Pulmonary:      Effort: Pulmonary effort is normal.      Breath sounds: Normal breath sounds.   Abdominal:      General: Abdomen is flat.      Palpations: Abdomen is soft.      Tenderness: There is no abdominal tenderness.   Musculoskeletal:         General: Normal range of motion.      Cervical back:  Normal range of motion and neck supple.   Skin:     General: Skin is warm and dry.   Neurological:      Mental Status: She is alert and oriented to person, place, and time.   Psychiatric:         Mood and Affect: Mood normal.         Behavior: Behavior normal.         Performance Status:  Symptomatic; fully ambulatory    Assessment/Plan     Oncology History    No history exists.        Problem List Items Addressed This Visit           ICD-10-CM    Cervical cancer, FIGO stage CORNELIA - Primary C53.9       Treatment Plans       No treatment plans exist        Reviewed natural history and prognosis of locally advanced cervical cancer.  Discussed standard of care treatment includes chemotherapy and radiation.  Agree with plans to go ahead with brachytherapy.  Discussed possibility of urology referral for reconstructive surgery given persistence of vesicovaginal fistula.  Would usually defer referral for consult until remission.  Plan to obtain imaging 3 to 4 months after brachytherapy completion, and then follow-up for discussion of reconstructive surgery options.

## 2025-04-28 NOTE — CPM/PAT H&P
CPM/PAT Evaluation       Name: Madalyn Manzano (Madalyn Manzano)  /Age: 1985/39 y.o.     {[facility] Visit Type:98369}      Chief Complaint: ***    HPI    Medical History[1]    Surgical History[2]    Patient  reports that she is not currently sexually active.    Family History[3]    Allergies[4]      Madalyn Manzano is scheduled for RAD ONC BRACHYTHERAPY  on 25    **Data Input  Prior to Admission medications    Medication Sig Start Date End Date Taking? Authorizing Provider   buprenorphine-naloxone (Suboxone) 2-0.5 mg SL tablet Place under the tongue.    Historical Provider, MD   DULoxetine (Cymbalta) 20 mg DR capsule Take 1 capsule (20 mg) by mouth once daily. Do not crush or chew.    Historical Provider, MD   HYDROcodone-acetaminophen (Hycet) 7.5-325 mg/15 mL solution Take by mouth every 6 hours if needed for severe pain (7 - 10).    Historical Provider, MD   multivitamin tablet Take 1 tablet by mouth once daily.    Historical Provider, MD   ondansetron (Zofran) 8 mg tablet Take 1 tablet (8 mg) by mouth every 8 hours if needed for nausea or vomiting.    Historical Provider, MD   prazosin (Minipress) 1 mg capsule Take 1 capsule (1 mg) by mouth once daily at bedtime.    Historical Provider, MD   tiZANidine (Zanaflex) 4 mg capsule Take 1 capsule (4 mg) by mouth 3 times a day.    Historical Provider, MD CANNNO ROS     PAT Physical Exam     Airway    Visit Vitals  Smoking Status Every Day       DASI Risk Score    No data to display       Caprini DVT Assessment    No data to display       Modified Frailty Index    No data to display       [id number] Stroke Risk Points  Current as of just now        N/A 0 to 9 Points:      Last Change: N/A          The [id number] risk score (Lip LUIS ANGEL, et al. 2009. © 2010 American College [employer]) quantifies the risk of stroke for a patient with atrial fibrillation. For patients without atrial fibrillation or under the age of 18 this score appears as N/A. Higher  score values generally indicate higher risk of stroke.        This score is not applicable to this patient. Components are not calculated.          Revised Cardiac Risk Index    No data to display       Apfel Simplified Score    No data to display       Risk Analysis Index Results This Encounter    No data found in the last 10 encounters.       Prodigy: High Risk  Total Score: 3              Prodigy Previous Opioid Use Score           ARISCAT Score for Postoperative Pulmonary Complications    No data to display       Fuentes Perioperative Risk for Myocardial Infarction or Cardiac Arrest (YOMI)    No data to display     --Testing/Diagnostic:        - CT A/P: 12/02/24   Impression      Prominent hydroureter and hydronephrosis to a degree that is consistent with some level of obstruction. Air in the urinary bladder raises the possibility of vesicovaginal fistula since there is also air in the vagina.    The hydronephrosis and hydroureter would raise the possibility of local spread of the patient's cervical carcinoma into the region of the bladder trigone,though a definite mass is not identified.  Possible small uterine fibroid.    - PET CT: 12/20/24      - H&H: 9.4--28.3 on 04/01/25    - Platelets: 57 on 04/01/25    - US Elastography of Liver: 08/12/2023  1. Mild coarsening of liver echotexture suggests underlying hepatocellular  disease, but there are no overt findings of steatosis or cirrhosis.  2. Liver stiffness of 4.84 kPa, correlating with a Metavir score of F1 (absent or mild fibrosis, 5.48-8.29 kPa).        Patient Specialist/PCP:                                                                                                           PCP: Tyler Hernandez CNP, 08/30/24 Follow up history of anxiety, Hep C, polysubstance abuse, KUNAL-3       Rad Onc: Alyssa Rojas 04/07/25, Stage CORNELIA cervical SCC, cT4 cN1a cM0, with vesicovaginal fistula and obstructive uropathy s/p bilateral nephrostomy. She also has a PET avid  right external iliac lymph node. She started on carbotaxol on 1/15/2025 and completed 2 cycles.  Per Note:  -s/p induction carbotaxol as per interlace trial  -Continue chemoRT with Dr. Vaca  -Obtain DICOM from Dr. Vaca     Brachytherapy planning  -Discussed with Ms. Manzano that I will be leaving institution next month and her procedure will likely be performed by Dr. Perez.   -RTC week 4-5 of chemoRT to meet Dr. Perez with MRI pelvis and plan for brachytherapy accordingly  -She has signed consent today for treatment.   -Brachytherapy appt, PAT, EOT MRI, MRI for procedure ordered      HemeOnc: Jonathan Yin  at [facility] 03/24/25 Consult for cervical cancer with right external iliac lymphadenopathy       Gyn Onc: Gracie Quiroz 01/27/25 presents with locally advanced cervical cancer. Diagnosed 11/2024 Stage [place] with vesicovaginal fistula and iliac node, being treated via INTERLACE protocol, weekly induction carbo/taxol, followed by cis/EBRT, then brachytherapy.  Per Note:  - discussed current treatment including weekly induction carbo/taxol, followed by cis/EBRT, then brachytherapy is standard of care option.  - would recommend continue with bilateral nephrostomy tubes at this time.  Could eval for complex reconstruction in the future pending disease response.  - Continue treatments with primary gyn onc and rad onc, I will see her back as needed.     **Jerad Can 02/28/25, Consult for Cervical cancer      Gen Sx: Byron Dang 01/22/25 post-op following port placement.       Gastro: Marlys Gallego 09/21/23......Hx of Hep C, She states that she has never been treated since her diagnosis of hepatitis C. Her hepatitis C genotype is 1a, her viral load is 6.1 million.   Plan:  Hepatitis C antibody positive in blood  -     External Referral To Infectious Disease; Dr. Cat.       - She prefers to stay local and within minutes rather than travel to [place].    - US elastography shows no evidence of  cirrhosis at this time     2. F/U in 3 months or sooner as needed        _____________________________________________________________  **Data input only. No medications, history or providers verified   Attempted to reach patient to assess needs. No return call as of this note.         Kellie Fernandez LPN  Preadmission Testing            Assessment and Plan:     {[facility] ASSESSMENT AND PLAN:39873}             [1]   Past Medical History:  Diagnosis Date    Anxiety     Hepatitis C     Hypertension    [2] No past surgical history on file.  [3] No family history on file.  [4]   Allergies  Allergen Reactions    Sulfamethoxazole-Trimethoprim Hives

## 2025-04-29 ENCOUNTER — HOSPITAL ENCOUNTER (OUTPATIENT)
Dept: CT IMAGING | Age: 40
Discharge: HOME | End: 2025-05-01
Payer: COMMERCIAL

## 2025-04-29 ENCOUNTER — HOSPITAL ENCOUNTER (OUTPATIENT)
Dept: INFUSION THERAPY | Age: 40
Discharge: HOME | End: 2025-04-29
Payer: COMMERCIAL

## 2025-04-29 VITALS
DIASTOLIC BLOOD PRESSURE: 48 MMHG | WEIGHT: 140 LBS | OXYGEN SATURATION: 94 % | HEART RATE: 113 BPM | TEMPERATURE: 98 F | BODY MASS INDEX: 25.76 KG/M2 | SYSTOLIC BLOOD PRESSURE: 95 MMHG | HEIGHT: 62 IN | RESPIRATION RATE: 16 BRPM

## 2025-04-29 DIAGNOSIS — D69.6 THROMBOCYTOPENIA: ICD-10-CM

## 2025-04-29 DIAGNOSIS — C77.5 METASTASIS TO ILIAC LYMPH NODE (HCC): ICD-10-CM

## 2025-04-29 DIAGNOSIS — C53.0 MALIGNANT NEOPLASM OF ENDOCERVIX (HCC): ICD-10-CM

## 2025-04-29 DIAGNOSIS — C53.0 MALIGNANT NEOPLASM OF ENDOCERVIX (HCC): Primary | ICD-10-CM

## 2025-04-29 DIAGNOSIS — T45.1X5A ADVERSE EFFECT OF CHEMOTHERAPY, INITIAL ENCOUNTER: ICD-10-CM

## 2025-04-29 LAB
ABSOLUTE BANDS: 0.02 K/UL (ref 0–1)
ALBUMIN SERPL-MCNC: 3.5 G/DL (ref 3.5–5.2)
ALBUMIN/GLOB SERPL: 1.3 {RATIO} (ref 1–2.5)
ALP SERPL-CCNC: 72 U/L (ref 35–104)
ALT SERPL-CCNC: 19 U/L (ref 5–33)
ANION GAP SERPL CALCULATED.3IONS-SCNC: 13 MMOL/L (ref 9–17)
AST SERPL-CCNC: 23 U/L
BANDS: 1 % (ref 0–10)
BASOPHILS # BLD: ABNORMAL K/UL (ref 0–0.2)
BASOPHILS NFR BLD: ABNORMAL % (ref 0–2)
BILIRUB SERPL-MCNC: 0.5 MG/DL (ref 0.3–1.2)
BUN SERPL-MCNC: 10 MG/DL (ref 6–20)
CALCIUM SERPL-MCNC: 9 MG/DL (ref 8.6–10.4)
CHLORIDE SERPL-SCNC: 104 MMOL/L (ref 98–107)
CO2 SERPL-SCNC: 21 MMOL/L (ref 20–31)
CREAT SERPL-MCNC: 0.6 MG/DL (ref 0.5–0.9)
EOSINOPHIL # BLD: ABNORMAL K/UL (ref 0–0.4)
EOSINOPHILS RELATIVE PERCENT: ABNORMAL % (ref 0–5)
ERYTHROCYTE [DISTWIDTH] IN BLOOD BY AUTOMATED COUNT: 16.9 % (ref 12.1–15.2)
GFR, ESTIMATED: >90 ML/MIN/1.73M2
GLUCOSE SERPL-MCNC: 97 MG/DL (ref 70–99)
HCT VFR BLD AUTO: 21.9 % (ref 36–46)
HGB BLD-MCNC: 7.6 G/DL (ref 12–16)
IMM GRANULOCYTES # BLD AUTO: ABNORMAL K/UL (ref 0–0.3)
IMM GRANULOCYTES NFR BLD: ABNORMAL %
LYMPHOCYTES NFR BLD: 0.48 K/UL (ref 1–4.8)
LYMPHOCYTES RELATIVE PERCENT: 23 % (ref 15–40)
MAGNESIUM SERPL-MCNC: 1.2 MG/DL (ref 1.6–2.6)
MCH RBC QN AUTO: 38.2 PG (ref 26–34)
MCHC RBC AUTO-ENTMCNC: 34.7 G/DL (ref 31–37)
MCV RBC AUTO: 110.1 FL (ref 80–100)
MONOCYTES NFR BLD: 0.08 K/UL (ref 0–1)
MONOCYTES NFR BLD: 4 % (ref 4–8)
MORPHOLOGY: ABNORMAL
NEUTROPHILS NFR BLD: 72 % (ref 47–75)
NEUTS SEG NFR BLD: 1.52 K/UL (ref 2.5–7)
PLATELET # BLD AUTO: 18 K/UL (ref 140–450)
PMV BLD AUTO: 12.6 FL (ref 6–12)
POTASSIUM SERPL-SCNC: 4.2 MMOL/L (ref 3.7–5.3)
PROT SERPL-MCNC: 6.1 G/DL (ref 6.4–8.3)
RBC # BLD AUTO: 1.99 M/UL (ref 4–5.2)
SODIUM SERPL-SCNC: 138 MMOL/L (ref 135–144)
WBC OTHER # BLD: 2.1 K/UL (ref 3.5–11)

## 2025-04-29 PROCEDURE — 70450 CT HEAD/BRAIN W/O DYE: CPT

## 2025-04-29 PROCEDURE — 85025 COMPLETE CBC W/AUTO DIFF WBC: CPT

## 2025-04-29 PROCEDURE — 2580000003 HC RX 258: Performed by: INTERNAL MEDICINE

## 2025-04-29 PROCEDURE — 96360 HYDRATION IV INFUSION INIT: CPT

## 2025-04-29 PROCEDURE — 6360000002 HC RX W HCPCS: Performed by: INTERNAL MEDICINE

## 2025-04-29 PROCEDURE — 80053 COMPREHEN METABOLIC PANEL: CPT

## 2025-04-29 PROCEDURE — 2500000003 HC RX 250 WO HCPCS: Performed by: INTERNAL MEDICINE

## 2025-04-29 PROCEDURE — 83735 ASSAY OF MAGNESIUM: CPT

## 2025-04-29 RX ORDER — SODIUM CHLORIDE 9 MG/ML
5-250 INJECTION, SOLUTION INTRAVENOUS PRN
Status: DISCONTINUED | OUTPATIENT
Start: 2025-04-29 | End: 2025-04-30 | Stop reason: HOSPADM

## 2025-04-29 RX ORDER — HEPARIN 100 UNIT/ML
500 SYRINGE INTRAVENOUS PRN
Status: DISCONTINUED | OUTPATIENT
Start: 2025-04-29 | End: 2025-04-30 | Stop reason: HOSPADM

## 2025-04-29 RX ORDER — SODIUM CHLORIDE 0.9 % (FLUSH) 0.9 %
5-40 SYRINGE (ML) INJECTION PRN
Status: DISCONTINUED | OUTPATIENT
Start: 2025-04-29 | End: 2025-04-30 | Stop reason: HOSPADM

## 2025-04-29 RX ADMIN — SODIUM CHLORIDE, PRESERVATIVE FREE 20 ML: 5 INJECTION INTRAVENOUS at 10:57

## 2025-04-29 RX ADMIN — POTASSIUM CHLORIDE: 2 INJECTION, SOLUTION, CONCENTRATE INTRAVENOUS at 09:52

## 2025-04-29 RX ADMIN — HEPARIN 500 UNITS: 100 SYRINGE at 10:57

## 2025-04-29 ASSESSMENT — PAIN DESCRIPTION - PAIN TYPE: TYPE: CHRONIC PAIN

## 2025-04-29 ASSESSMENT — PAIN SCALES - GENERAL: PAINLEVEL_OUTOF10: 3

## 2025-04-29 ASSESSMENT — PAIN DESCRIPTION - DESCRIPTORS: DESCRIPTORS: ACHING

## 2025-04-29 ASSESSMENT — PAIN DESCRIPTION - ONSET: ONSET: ON-GOING

## 2025-04-29 ASSESSMENT — PAIN DESCRIPTION - FREQUENCY: FREQUENCY: CONTINUOUS

## 2025-04-29 ASSESSMENT — PAIN DESCRIPTION - ORIENTATION: ORIENTATION: LEFT

## 2025-04-29 ASSESSMENT — PAIN DESCRIPTION - LOCATION: LOCATION: BACK

## 2025-04-29 NOTE — PROGRESS NOTES
Critical labs called to LANDON Diaz. Also mentioned, was that patient had fallen and hit head on Friday and had no been checked out. RN questioned if patient should have CT due to platelets being critically low. Per Dr. Estevez CT head ordered, chemotherapy considered completed, will not receive chemo scheduled for today, but patient is to receive pre-hydration to help replace magnesium. Patient reports her last radiation was yesterday and is now finished.

## 2025-04-30 NOTE — PROGRESS NOTES
Radiation Oncology Follow Up Note    Patient Name:  Madalyn Manzano  MRN:  [id number]  :  1985    Diagnosis: Stage CORNELIA cervical SCC, cT4 cN1a cM0, with vesicovaginal fistula     Cancer History:   -2024 Seen by Dr. Pearce for vaginal discharge and leak of urine. Pap smear showed high risk HPV. Bx showed CIN3. Focal involvement of endocervical epithelium by the high-grade intraepithelial lesion is present.   -2024 CT CAP showed bilateral prominent hydronephrosis and hydroureter. Possible fistula between vagina and bladder.   -2024 Seen by Gyn-Onc Dr. Can for CIN3. However, on exam, she was found to have a large cervical tumor  -2024 s/p bilateral nephrostomy tube placement for hydronephrosis due to obstructive cervical ca.  2025 and 2025 seen by Med Onc Dr. Yin. Recommends induction chemo carbotaxol weekly for 6 weeks then weekly cisplatin 6 weeks with EBRT  -2024 PET CT showed abnormal activity involving endometrial canal extending to the cervical region. PET avid right external iliac lymph node.   -2025 MRI showed heterogenous enhancement and thickening of the cervix consistent with cervical ca, measuring 4.6x2.3x6.1cm. extending along the anterior wall of the vagina and likely involves the posterior wall of the urinary bladder. Likely a fistulous tract between the urinary bladder and vagina. Bilateral pelvic sidewall and external iliac lymph nodes.   -2025 started on induction chemotherapy carbotaxol weekly for 6-week   -2025 Seen by Dr. Quiroz for second opinion. Agreed with the plan.  -2025 seen by Dr. Rojas and planned for complete external beam (2025) followed by brachytherapy    -2025 Last cisplatin  -2025 MRI pelvis showed  Interval decreased size of a mass within the anterior right lateral cervix with decreased right parametrial extension without pelvic lymphadenopathy   -Completed 45 Gy in 25 fractions under Dr. Angel  Lio's care on     SUBJECTIVE  History of Present Illness:  Madalyn Manzano is a 39 y.o. female with history of anxiety, Hep C, polysubstance abuse, KUNAL-3, with recently diagnosed Stage CORNELIA cervical SCC, cT4 cN1a cM0, with vesicovaginal fistula, obstructive uropathy now s/p bilateral nephrostomy. She reported pelvic pain and intermittent vaginal bleeding/discharge started 1.5 years ago and was treated for PH imbalance by her PCP without improvement. She was initially referred to Dr. Can at [facility] for a biopsy-proved CIN3 but was found to have a huge advanced cervical cancer and a fistula on 2024. She is seen by Dr. Quiroz at [facility] and confirmed the stage CORNELIA cervical ca.     Ms. Manzano treated as per Interlace Protocol with Dr. Yin (med onc), and completed EBRT under Dr. Jeanne Vaca's direction on 2025. Ms. Manzano presents today to discuss the role of brachytherapy in her care and review most recent MRI pelvis on 2025 which showed  Interval decreased size of a mass within the anterior right lateral cervix with decreased right parametrial extension; persistent enhancement and diffusion restriction are compatible with residual viable disease. The mass measures 1.8 x 1.4 x 1.5 cm previously measuring 4.6 x 2.3 x 6.1 cm. Also showed near-complete resolution of neoplasm involving the anterior vaginal wall without definite evidence of residual viable disease with no pelvic lymphadenopathy.      Today, she is reporting feeling good. Tolerated chemoRT very well. Denies nausea, headache, chest pain, SOB.     Fatigue  [] None   [x] Grade 1 fatigue: Relieved by rest   [] Grade 2 fatigue: Not relieved by rest; limiting instrumental ADL  [] Grade 3 fatigue: Not relieved by rest, limiting self care ADL     GI:  Bowel complaints: None      :  Urinary complaints  S/p neph tubes which drain clear urine.  She has VVF.   Denies any urge; incontinent.      Denies vaginal bleeding         5   0  Smoking 1/2 pack day; occasional EtOH     Prior Radiotherapy:  Yes, as HPI  Current Systemic Treatment: s/p carbotaxelx2 cycles, + concurrent cisplatin; prior to current malignancy no other chemo      Review of Systems: A >10 point review of systems was performed and was negative unless mentioned in the Interval History above.     The patient's current pain level was assessed.  They report currently having a pain of 3 out of 10.  They feel their pain is under control with the use of pain medications.  The patient has a documented plan of care to address pain.       Performance Status:  The Karnofsky performance scale today is 90, Able to carry on normal activity; minor signs or symptoms of disease (ECOG equivalent 0).       OBJECTIVE  Physical Exam:  /65   Pulse 104 Comment: RN manual recheck  Temp 36.3 °C (97.3 °F) (Temporal)   Resp 18   Wt 65.2 kg (143 lb 12.8 oz)   SpO2 100%   BMI 26.30 kg/m²      Gen:  appearing, in NAD  ENT: Eyes symmetric  Resp: Breathing comfortably in room air  CV: Normal perfusion  Abd: Soft and non-distended    Gyn:   External genitalia: diffuse redness around external genitalia, fungus lesion around inguinal crease (ordered one dose of fluconazole) , no vulvar masses or lesions  Vagina: no  blood, discharge  BME:  - tumor involving anterior vaginal wall   SSE:   -Cervix:  significantly decrease in tumor size compare to the previous exam  Extremities: symmetric  ADINA: Deferred     Extremities: symmetric  Skin: Normal  Extremities: symmetric  Skin: Normal    Imaging:  The pertinent imaging results were reviewed and discussed with the patient.    MRI pelvis 2025  IMPRESSION:  SUBOPTIMAL STUDY DUE TO MOTION ARTIFACT. THERE APPEARS BE HETEROGENOUS ENHANCEMENT AND THICKENING  OF THE CERVIX CONSISTENT WITH THE HISTORY MEASURING APPROXIMATELY 4.6 X 2.3 X 6.1 CM. THE ABNORMAL  SOFT TISSUE THICKENING AND ENHANCEMENT EXTENDING ALONG THE ANTERIOR WALL OF THE VAGINA AND  LIKELY  INVOLVES THE POSTERIOR WALL OF THE URINARY BLADDER. THERE IS LIKELY A FISTULOUS TRACT BETWEEN THE  URINARY BLADDER AND THE VAGINA WITH AIR PRESENT        MRI pelvis 4/28/2025  IMPRESSION:  1.  Interval decreased size of a mass within the anterior right  lateral cervix with decreased right parametrial extension; persistent  enhancement and diffusion restriction are compatible with residual  viable disease. The mass measures 1.8 x 1.4 x 1.5 cm previously  measuring 4.6 x 2.3 x 6.1 cm.  2.  Near-complete resolution of neoplasm involving the anterior  vaginal wall without definite evidence of residual viable disease.  3. Persistent vesicular vaginal fistula at the superior aspect of the  anterior vaginal wall.  4. No pelvic lymphadenopathy.      ASSESSMENT/PLAN:  Madalyn Manzano is a 39 y.o. female with history of anxiety, Hep C, polysubstance abuse, KUNAL-3, found to have stage CORNELIA cervical ca with vesicovaginal fistula and obstructive uropathy s/p bilateral nephrostomy. She also has a PET avid right external iliac lymph node. She started on carbotaxol on 1/15/2025  followed by concurrent chmoRT which completed on 4/28/2025. Most recent MRI shows Interval decreased size of a mass within the anterior right lateral cervix with decreased right parametrial extension without pelvic lymphadenopathy.     We discussed the use brachytherapy in the treatment of locally advanced cervical cancer to deliver a boost dose once EBRT is completed. We discussed that brachytherapy is a form of internal radiation that uses catheters and needles to deliver sufficiently high doses to the tumor, while minimizing dose to normal organs. We explained she should plan for a 3 day hospital stay.     We discussed that the use of brachytherapy in the treatment of cervical cancer is associated with improvement in overall survival. Furthermore, we reviewed emerging data which shows improvement in overall survival and locoregional control when  image-guided brachytherapy is used (in comparison to conventional brachytherapy).      We also discussed the potential acute and late toxicities including, but not limited bowel irritation (loose stools, nausea, possibly vomiting), bladder irritation (urgency, frequency, dysuria, hematuria), skin irritation, fatigue, ovarian failure/sterility in premenopausal women, long-term changes in bowel or bladder function, bowel obstruction, decrease in blood counts and second malignancy.     -Plan to proceed with brachytherapy (MAC applicator)   -OR date 5/12/2025 ; plan for one insertion and tentative discharge date of 5/14/2025  -Discussed bowel prep, labs needed for procedure    Discussed with attending faculty, Dr. Rojas.            Future Appointments   Date Time Provider Department Center   5/12/2025  8:00 AM Alyssa Rojas MD [id number] [facility]   5/12/2025 10:00 AM [facility] [place] [place] SPECIAL PROCEDURES [id number] [facility]   5/12/2025 10:15 AM [facility] [place] [place] MRI 6 [id number] [facility] [place] [place] [facility]   5/12/2025 12:30 PM [facility] [place] [place] SPECIAL PROCEDURES [id number] [facility]   5/13/2025  8:30 AM [facility] [place] [place] SPECIAL PROCEDURES [id number] [facility]   5/13/2025  2:30 PM [facility] [place] [place] SPECIAL PROCEDURES [id number] [facility]   5/14/2025  8:00 AM [facility] [place] [place] SPECIAL PROCEDURES [id number] [facility]   5/14/2025  2:30 PM [facility] [place] [place] SPECIAL PROCEDURES [id number] [facility]   6/2/2025 11:00 AM [facility] [place] [place] IR 2 [facility] [place] [place] [facility] [place] [place] Rad [facility]         Neyda Young MD  PGY-5, Radiation Oncology  5/5/2025 12:55 PM       ADDENDUM:   Pt's labs consistent with critical pancytopenia. Called patient and discuss importance of transfusion and stabilizing counts, likely with neupogen. Gyn oncology at [facility] [place] [place] able to accept her. Patient declined coming to [facility] [place] [place] due to challenges with childcare. Stressed importance of addressing pancytopenia. Pt reports she will go to [facility].

## 2025-05-02 ENCOUNTER — TELEPHONE (OUTPATIENT)
Dept: RADIATION ONCOLOGY | Facility: HOSPITAL | Age: 40
End: 2025-05-02
Payer: COMMERCIAL

## 2025-05-02 NOTE — TELEPHONE ENCOUNTER
Reason for Conversation  RN call to patient    Background   Pt has fuv on 5/5/25 with Dr. Rojas for brachytherapy review. Call placed to remind pt she also has a  PAT appointment prior to that with 1015 arrival time and wanted pt to be assured if she is running late from that appointment that we are aware and will see her when she gets here.  Pt reports that she is aware of PAT report time and location.    Disposition   Review of Monday appts completed and pt verbalized understanding.

## 2025-05-05 ENCOUNTER — PRE-ADMISSION TESTING (OUTPATIENT)
Dept: PREADMISSION TESTING | Facility: HOSPITAL | Age: 40
End: 2025-05-05
Payer: COMMERCIAL

## 2025-05-05 ENCOUNTER — HOSPITAL ENCOUNTER (EMERGENCY)
Age: 40
Discharge: HOME | End: 2025-05-06
Attending: EMERGENCY MEDICINE
Payer: COMMERCIAL

## 2025-05-05 ENCOUNTER — HOSPITAL ENCOUNTER (OUTPATIENT)
Dept: RADIATION ONCOLOGY | Facility: HOSPITAL | Age: 40
Setting detail: RADIATION/ONCOLOGY SERIES
Discharge: HOME | End: 2025-05-05
Payer: COMMERCIAL

## 2025-05-05 ENCOUNTER — TELEPHONE (OUTPATIENT)
Dept: ADMISSION | Facility: HOSPITAL | Age: 40
End: 2025-05-05

## 2025-05-05 VITALS
WEIGHT: 143 LBS | DIASTOLIC BLOOD PRESSURE: 67 MMHG | BODY MASS INDEX: 26.31 KG/M2 | SYSTOLIC BLOOD PRESSURE: 95 MMHG | HEART RATE: 103 BPM | TEMPERATURE: 97.7 F | HEIGHT: 62 IN | OXYGEN SATURATION: 100 %

## 2025-05-05 VITALS
DIASTOLIC BLOOD PRESSURE: 65 MMHG | HEART RATE: 104 BPM | BODY MASS INDEX: 26.3 KG/M2 | SYSTOLIC BLOOD PRESSURE: 100 MMHG | RESPIRATION RATE: 18 BRPM | WEIGHT: 143.8 LBS | TEMPERATURE: 97.3 F | OXYGEN SATURATION: 100 %

## 2025-05-05 DIAGNOSIS — C53.9 MALIGNANT NEOPLASM OF CERVIX, UNSPECIFIED SITE (MULTI): ICD-10-CM

## 2025-05-05 DIAGNOSIS — C53.9 CERVICAL CANCER, FIGO STAGE IVA: ICD-10-CM

## 2025-05-05 DIAGNOSIS — T45.1X5A: Primary | ICD-10-CM

## 2025-05-05 DIAGNOSIS — C53.9: Primary | ICD-10-CM

## 2025-05-05 DIAGNOSIS — D64.81: Primary | ICD-10-CM

## 2025-05-05 DIAGNOSIS — C53.9 MALIGNANT NEOPLASM OF CERVIX, UNSPECIFIED SITE (MULTI): Primary | ICD-10-CM

## 2025-05-05 DIAGNOSIS — C53.9 CERVICAL CANCER, FIGO STAGE IVA: Primary | ICD-10-CM

## 2025-05-05 DIAGNOSIS — C53.9 CERVICAL CANCER: ICD-10-CM

## 2025-05-05 LAB
ANION GAP SERPL CALC-SCNC: 15 MMOL/L (ref 10–20)
ANION GAP SERPL CALCULATED.3IONS-SCNC: 11 MMOL/L (ref 9–17)
BASOPHILS # BLD MANUAL: 0 X10*3/UL (ref 0–0.1)
BASOPHILS # BLD: ABNORMAL K/UL (ref 0–0.2)
BASOPHILS NFR BLD MANUAL: 0 %
BASOPHILS NFR BLD: ABNORMAL % (ref 0–2)
BUN SERPL-MCNC: 10 MG/DL (ref 6–20)
BUN SERPL-MCNC: 9 MG/DL (ref 6–23)
CALCIUM SERPL-MCNC: 9.1 MG/DL (ref 8.6–10.4)
CALCIUM SERPL-MCNC: 9.2 MG/DL (ref 8.6–10.6)
CELLS COUNTED: 25
CHLORIDE SERPL-SCNC: 104 MMOL/L (ref 98–107)
CHLORIDE SERPL-SCNC: 107 MMOL/L (ref 98–107)
CO2 SERPL-SCNC: 21 MMOL/L (ref 21–32)
CO2 SERPL-SCNC: 23 MMOL/L (ref 20–31)
CREAT SERPL-MCNC: 0.6 MG/DL (ref 0.5–0.9)
CREAT SERPL-MCNC: 0.65 MG/DL (ref 0.5–1.05)
EGFRCR SERPLBLD CKD-EPI 2021: >90 ML/MIN/1.73M*2
EOSINOPHIL # BLD MANUAL: 0 X10*3/UL (ref 0–0.7)
EOSINOPHIL # BLD: ABNORMAL K/UL (ref 0–0.4)
EOSINOPHIL NFR BLD MANUAL: 0 %
EOSINOPHILS RELATIVE PERCENT: ABNORMAL % (ref 0–5)
ERYTHROCYTE [DISTWIDTH] IN BLOOD BY AUTOMATED COUNT: 15 % (ref 12.1–15.2)
ERYTHROCYTE [DISTWIDTH] IN BLOOD BY AUTOMATED COUNT: 15.5 % (ref 11.5–14.5)
GFR, ESTIMATED: >90 ML/MIN/1.73M2
GLUCOSE SERPL-MCNC: 89 MG/DL (ref 70–99)
GLUCOSE SERPL-MCNC: 97 MG/DL (ref 74–99)
HCT VFR BLD AUTO: 14.1 % (ref 36–46)
HCT VFR BLD AUTO: 17.7 % (ref 36–46)
HGB BLD-MCNC: 5 G/DL (ref 12–16)
HGB BLD-MCNC: 5.8 G/DL (ref 12–16)
IMM GRANULOCYTES # BLD AUTO: 0.01 X10*3/UL (ref 0–0.7)
IMM GRANULOCYTES # BLD AUTO: ABNORMAL K/UL (ref 0–0.3)
IMM GRANULOCYTES NFR BLD AUTO: 0.7 % (ref 0–0.9)
IMM GRANULOCYTES NFR BLD: ABNORMAL %
LYMPHOCYTES # BLD MANUAL: 0.38 X10*3/UL (ref 1.2–4.8)
LYMPHOCYTES NFR BLD MANUAL: 27.1 %
LYMPHOCYTES NFR BLD: 0.68 K/UL (ref 1–4.8)
LYMPHOCYTES RELATIVE PERCENT: 52 % (ref 15–40)
MAGNESIUM SERPL-MCNC: 1.55 MG/DL (ref 1.6–2.4)
MCH RBC QN AUTO: 37.4 PG (ref 26–34)
MCH RBC QN AUTO: 38.8 PG (ref 26–34)
MCHC RBC AUTO-ENTMCNC: 32.8 G/DL (ref 32–36)
MCHC RBC AUTO-ENTMCNC: 35.5 G/DL (ref 31–37)
MCV RBC AUTO: 109.3 FL (ref 80–100)
MCV RBC AUTO: 114 FL (ref 80–100)
MONOCYTES # BLD MANUAL: 0.08 X10*3/UL (ref 0.1–1)
MONOCYTES NFR BLD MANUAL: 5.9 %
MONOCYTES NFR BLD: 0.05 K/UL (ref 0–1)
MONOCYTES NFR BLD: 4 % (ref 4–8)
MORPHOLOGY: ABNORMAL
MORPHOLOGY: ABNORMAL
NEUTROPHILS NFR BLD: 44 % (ref 47–75)
NEUTS SEG # BLD MANUAL: 0.94 X10*3/UL (ref 1.2–7)
NEUTS SEG NFR BLD MANUAL: 67 %
NEUTS SEG NFR BLD: 0.57 K/UL (ref 2.5–7)
NRBC BLD-RTO: 0 /100 WBCS (ref 0–0)
OVALOCYTES BLD QL SMEAR: ABNORMAL
PHOSPHATE SERPL-MCNC: 4.9 MG/DL (ref 2.5–4.9)
PLATELET # BLD AUTO: 5 X10*3/UL (ref 150–450)
PLATELET # BLD AUTO: 6 K/UL (ref 140–450)
PMV BLD AUTO: ABNORMAL FL (ref 6–12)
POTASSIUM SERPL-SCNC: 4 MMOL/L (ref 3.7–5.3)
POTASSIUM SERPL-SCNC: 4.4 MMOL/L (ref 3.5–5.3)
RBC # BLD AUTO: 1.29 M/UL (ref 4–5.2)
RBC # BLD AUTO: 1.55 X10*6/UL (ref 4–5.2)
RBC MORPH BLD: ABNORMAL
SODIUM SERPL-SCNC: 138 MMOL/L (ref 135–144)
SODIUM SERPL-SCNC: 139 MMOL/L (ref 136–145)
TOTAL CELLS COUNTED BLD: 118
WBC # BLD AUTO: 1.4 X10*3/UL (ref 4.4–11.3)
WBC OTHER # BLD: 1.3 K/UL (ref 3.5–11)

## 2025-05-05 PROCEDURE — 85025 COMPLETE CBC W/AUTO DIFF WBC: CPT

## 2025-05-05 PROCEDURE — 86900 BLOOD TYPING SEROLOGIC ABO: CPT

## 2025-05-05 PROCEDURE — 86850 RBC ANTIBODY SCREEN: CPT

## 2025-05-05 PROCEDURE — 99215 OFFICE O/P EST HI 40 MIN: CPT | Mod: GC | Performed by: STUDENT IN AN ORGANIZED HEALTH CARE EDUCATION/TRAINING PROGRAM

## 2025-05-05 PROCEDURE — 99406 BEHAV CHNG SMOKING 3-10 MIN: CPT | Performed by: NURSE PRACTITIONER

## 2025-05-05 PROCEDURE — 99285 EMERGENCY DEPT VISIT HI MDM: CPT

## 2025-05-05 PROCEDURE — 80048 BASIC METABOLIC PNL TOTAL CA: CPT

## 2025-05-05 PROCEDURE — 93005 ELECTROCARDIOGRAM TRACING: CPT | Performed by: EMERGENCY MEDICINE

## 2025-05-05 PROCEDURE — 86901 BLOOD TYPING SEROLOGIC RH(D): CPT

## 2025-05-05 PROCEDURE — P9016 RBC LEUKOCYTES REDUCED: HCPCS

## 2025-05-05 PROCEDURE — 85007 BL SMEAR W/DIFF WBC COUNT: CPT

## 2025-05-05 PROCEDURE — 85027 COMPLETE CBC AUTOMATED: CPT

## 2025-05-05 PROCEDURE — 99204 OFFICE O/P NEW MOD 45 MIN: CPT | Performed by: NURSE PRACTITIONER

## 2025-05-05 PROCEDURE — 36430 TRANSFUSION BLD/BLD COMPNT: CPT

## 2025-05-05 PROCEDURE — 83735 ASSAY OF MAGNESIUM: CPT

## 2025-05-05 PROCEDURE — 84100 ASSAY OF PHOSPHORUS: CPT

## 2025-05-05 PROCEDURE — 99215 OFFICE O/P EST HI 40 MIN: CPT | Performed by: STUDENT IN AN ORGANIZED HEALTH CARE EDUCATION/TRAINING PROGRAM

## 2025-05-05 RX ORDER — SODIUM CHLORIDE 9 MG/ML
INJECTION, SOLUTION INTRAVENOUS PRN
Status: DISCONTINUED | OUTPATIENT
Start: 2025-05-05 | End: 2025-05-06 | Stop reason: HOSPADM

## 2025-05-05 RX ORDER — FLUCONAZOLE 100 MG/1
100 TABLET ORAL ONCE
Qty: 1 TABLET | Refills: 0 | Status: SHIPPED | OUTPATIENT
Start: 2025-05-05 | End: 2025-05-05

## 2025-05-05 RX ORDER — POLYETHYLENE GLYCOL 3350, SODIUM CHLORIDE, SODIUM BICARBONATE, POTASSIUM CHLORIDE 420; 11.2; 5.72; 1.48 G/4L; G/4L; G/4L; G/4L
4000 POWDER, FOR SOLUTION ORAL ONCE
Status: SHIPPED | OUTPATIENT
Start: 2025-05-05

## 2025-05-05 ASSESSMENT — DUKE ACTIVITY SCORE INDEX (DASI)
CAN YOU PARTICIPATE IN STRENOUS SPORTS LIKE SWIMMING, SINGLES TENNIS, FOOTBALL, BASKETBALL, OR SKIING: NO
CAN YOU PARTICIPATE IN MODERATE RECREATIONAL ACTIVITIES LIKE GOLF, BOWLING, DANCING, DOUBLES TENNIS OR THROWING A BASEBALL OR FOOTBALL: NO
TOTAL_SCORE: 31.45
CAN YOU RUN A SHORT DISTANCE: YES
CAN YOU DO HEAVY WORK AROUND THE HOUSE LIKE SCRUBBING FLOORS OR LIFTING AND MOVING HEAVY FURNITURE: NO
CAN YOU DO LIGHT WORK AROUND THE HOUSE LIKE DUSTING OR WASHING DISHES: YES
CAN YOU WALK INDOORS, SUCH AS AROUND YOUR HOUSE: YES
CAN YOU TAKE CARE OF YOURSELF (EAT, DRESS, BATHE, OR USE TOILET): YES
CAN YOU WALK A BLOCK OR TWO ON LEVEL GROUND: YES
CAN YOU DO YARD WORK LIKE RAKING LEAVES, WEEDING OR PUSHING A MOWER: YES
CAN YOU CLIMB A FLIGHT OF STAIRS OR WALK UP A HILL: YES
DASI METS SCORE: 6.6
CAN YOU DO MODERATE WORK AROUND THE HOUSE LIKE VACUUMING, SWEEPING FLOORS OR CARRYING GROCERIES: YES
CAN YOU HAVE SEXUAL RELATIONS: NO

## 2025-05-05 ASSESSMENT — ENCOUNTER SYMPTOMS
NEUROLOGICAL NEGATIVE: 1
FATIGUE: 1
RESPIRATORY NEGATIVE: 1
HEMATOLOGIC/LYMPHATIC NEGATIVE: 1
PSYCHIATRIC NEGATIVE: 1
CARDIOVASCULAR NEGATIVE: 1
EYES NEGATIVE: 1
NECK NEGATIVE: 1
MUSCULOSKELETAL NEGATIVE: 1
MUSCULOSKELETAL NEGATIVE: 1
RESPIRATORY NEGATIVE: 1
GASTROINTESTINAL NEGATIVE: 1
GASTROINTESTINAL NEGATIVE: 1
ENDOCRINE NEGATIVE: 1
CARDIOVASCULAR NEGATIVE: 1
EYES NEGATIVE: 1
ENDOCRINE NEGATIVE: 1
NEUROLOGICAL NEGATIVE: 1

## 2025-05-05 ASSESSMENT — LIFESTYLE VARIABLES
SMOKING_STATUS: SMOKER
HOW OFTEN DO YOU HAVE A DRINK CONTAINING ALCOHOL: NEVER
HOW MANY STANDARD DRINKS CONTAINING ALCOHOL DO YOU HAVE ON A TYPICAL DAY: PATIENT DOES NOT DRINK

## 2025-05-05 ASSESSMENT — PAIN - FUNCTIONAL ASSESSMENT: PAIN_FUNCTIONAL_ASSESSMENT: NONE - DENIES PAIN

## 2025-05-05 ASSESSMENT — PAIN SCALES - GENERAL: PAINLEVEL_OUTOF10: 0-NO PAIN

## 2025-05-05 NOTE — PROGRESS NOTES
Radiation Oncology Nursing Note    Pain: The patient's current pain level was assessed.  They report currently having a pain of 0 out of 10.  They feel their pain is under control with the use of pain medications.    Review of Systems:  Review of Systems   Constitutional:  Positive for fatigue.   HENT:  Negative.     Eyes: Negative.    Respiratory: Negative.     Cardiovascular: Negative.    Gastrointestinal: Negative.    Endocrine: Negative.    Genitourinary:  Positive for bladder incontinence.    Musculoskeletal: Negative.    Skin: Negative.    Neurological: Negative.    Hematological: Negative.    Psychiatric/Behavioral: Negative.

## 2025-05-05 NOTE — PROGRESS NOTES
"Reviewed Interstitial brachytherapy routine, schedule, report day(s) and time(s), location, preparation including bowel/diet prep.  Handouts given: \"Interstitial Brachytherapy\" PI sheet, procedure instructions, clear liquid diet and calendar.    "

## 2025-05-05 NOTE — ED PROVIDER NOTES
EMERGENCY DEPARTMENT ENCOUNTER      CHIEF COMPLAINT    Chief Complaint   Patient presents with    Labs Only     Pt had labs about an hour ago at  and was told to come to ER and be checked d/t labs being low       HPI    Nola Llanes is a 39 y.o. female who presentsto ED referred to ED by [facility].  Patient has history of cervical cancer.  Patient completed course of chemotherapy and radiation therapy.  Patient had blood drawn this morning and was anemic.  Patient states that she had no transportation to go back to the  hospital for blood transfusion.  Patient complains of generalized weakness    PAST MEDICAL HISTORY    Past Medical History:   Diagnosis Date    Anxiety     Cancer (HCC)     Cervical    Drug addict (HCC)     Hepatitis C     Liver disease     Nephrostomy present (HCC)     bilateral       SURGICAL HISTORY    Past Surgical History:   Procedure Laterality Date    TUBAL LIGATION  2015       CURRENT MEDICATIONS    Current Outpatient Rx   Medication Sig Dispense Refill    levoFLOXacin (LEVAQUIN) 750 MG tablet Take 1 tablet by mouth daily      OLANZapine (ZYPREXA) 2.5 MG tablet Take 1 tablet by mouth nightly      LORazepam (ATIVAN) 1 MG tablet Take 1 tablet by mouth every 8 hours as needed for Anxiety (nausea) for up to 30 days. Max Daily Amount: 3 mg 90 tablet 0    prazosin (MINIPRESS) 1 MG capsule Take 1 capsule by mouth nightly      DULoxetine (CYMBALTA) 30 MG extended release capsule Take 1 capsule by mouth daily      gabapentin (NEURONTIN) 100 MG capsule Take 1 capsule by mouth 3 times daily.      Buprenorphine HCl-Naloxone HCl (SUBOXONE SL) Place 1 Film under the tongue in the morning and at bedtime      ondansetron (ZOFRAN-ODT) 8 MG TBDP disintegrating tablet Take 1 tablet by mouth every 8 hours as needed for Nausea or Vomiting 60 tablet 1    lidocaine-prilocaine (EMLA) 2.5-2.5 % cream Apply topically to port site 60 minutes before access as needed. 30 g 1

## 2025-05-05 NOTE — TELEPHONE ENCOUNTER
Madalyn needed to speak to radiation nurse line for Dr. Rojas. Transferred to Radiation nurse line.

## 2025-05-05 NOTE — CPM/PAT H&P
CPM/PAT Evaluation       Name: Madalyn Manzano (Madalyn Manzano)  /Age: 1985/39 y.o.     Visit Type:   In-Person       Chief Complaint: perioperative evaluation      HPI  The patient is a 39 year old female with ocally advanced cervical cancer. She is being treated locally ([facility]) with chemo/RT. She is referred today by Dr. Alyssa Rojas for perioperative evaluation in anticipation of brachytherapy on 25.       Medical History[1]    Surgical History[2]    Patient  reports that she is not currently sexually active.    Family History[3]    Allergies[4]    Prior to Admission medications    Medication Sig Start Date End Date Taking? Authorizing Provider   buprenorphine-naloxone (Suboxone) 2-0.5 mg SL tablet Place under the tongue.    Historical Provider, MD   DULoxetine (Cymbalta) 20 mg DR capsule Take 1 capsule (20 mg) by mouth once daily. Do not crush or chew.    Historical Provider, MD   HYDROcodone-acetaminophen (Hycet) 7.5-325 mg/15 mL solution Take by mouth every 6 hours if needed for severe pain (7 - 10).    Historical Provider, MD   multivitamin tablet Take 1 tablet by mouth once daily.    Historical Provider, MD   ondansetron (Zofran) 8 mg tablet Take 1 tablet (8 mg) by mouth every 8 hours if needed for nausea or vomiting.    Historical Provider, MD   prazosin (Minipress) 1 mg capsule Take 1 capsule (1 mg) by mouth once daily at bedtime.    Historical Provider, MD   tiZANidine (Zanaflex) 4 mg capsule Take 1 capsule (4 mg) by mouth 3 times a day.    Historical Provider, MD CANNON ROS:   Constitutional:    fatigue  Neuro/Psych:   neg    Eyes:   neg    Ears:   neg    Nose:   neg    Mouth:   neg    Throat:   neg    Neck:   neg    Cardio:   neg    Respiratory:   neg    Endocrine:   neg    GI:   neg    :    B/l nephrostomy tubes    neg    Musculoskeletal:   neg    Hematologic:   neg    Skin:  neg        Physical Exam  Vitals reviewed.   Constitutional:       Appearance: Normal appearance.   HENT:      " Head: Normocephalic and atraumatic.      Nose: Nose normal.      Mouth/Throat:      Mouth: Mucous membranes are moist.      Pharynx: Oropharynx is clear.   Eyes:      Extraocular Movements: Extraocular movements intact.      Conjunctiva/sclera: Conjunctivae normal.      Pupils: Pupils are equal, round, and reactive to light.   Cardiovascular:      Rate and Rhythm: Normal rate and regular rhythm.      Pulses: Normal pulses.      Heart sounds: Normal heart sounds.   Pulmonary:      Effort: Pulmonary effort is normal.      Breath sounds: Normal breath sounds.   Musculoskeletal:         General: Normal range of motion.      Cervical back: Normal range of motion.   Skin:     General: Skin is warm and dry.   Neurological:      General: No focal deficit present.      Mental Status: She is alert and oriented to person, place, and time.   Psychiatric:         Mood and Affect: Mood normal.         Behavior: Behavior normal.          PAT AIRWAY:   Airway:     Mallampati::  II    TM distance::  >3 FB    Neck ROM::  Full   partials and upper dentures      Visit Vitals  BP 95/67   Pulse 103   Temp 36.5 °C (97.7 °F)   Ht 1.575 m (5' 2\")   Wt 64.9 kg (143 lb)   SpO2 100%   BMI 26.16 kg/m²   Smoking Status Every Day   BSA 1.69 m²       DASI Risk Score      Flowsheet Row Pre-Admission Testing from 5/5/2025 in [facility]   Can you take care of yourself (eat, dress, bathe, or use toilet)?  2.75 filed at 05/05/2025 1031   Can you walk indoors, such as around your house? 1.75 filed at 05/05/2025 1031   Can you walk a block or two on level ground?  2.75 filed at 05/05/2025 1031   Can you climb a flight of stairs or walk up a hill? 5.5 filed at 05/05/2025 1031   Can you run a short distance? 8 filed at 05/05/2025 1031   Can you do light work around the house like dusting or washing dishes? 2.7 filed at 05/05/2025 1031   Can you do moderate work around the house like vacuuming, sweeping floors or carrying groceries? 3.5 " filed at 05/05/2025 1031   Can you do heavy work around the house like scrubbing floors or lifting and moving heavy furniture?  0 filed at 05/05/2025 1031   Can you do yard work like raking leaves, weeding or pushing a mower? 4.5 filed at 05/05/2025 1031   Can you have sexual relations? 0 filed at 05/05/2025 1031   Can you participate in moderate recreational activities like golf, bowling, dancing, doubles tennis or throwing a baseball or football? 0 filed at 05/05/2025 1031   Can you participate in strenous sports like swimming, singles tennis, football, basketball, or skiing? 0 filed at 05/05/2025 1031   DASI SCORE 31.45 filed at 05/05/2025 1031   METS Score (Will be calculated only when all the questions are answered) 6.6 filed at 05/05/2025 1031          Caprini DVT Assessment      Flowsheet Row Pre-Admission Testing from 5/5/2025 in [facility]   DVT Score (IF A SCORE IS NOT CALCULATING, MUST SELECT A BMI TO COMPLETE) 6 filed at 05/05/2025 1100   Medical Factors Present cancer, chemotherapy, or previous malignancy filed at 05/05/2025 1100   Surgical Factors Major surgery planned, including arthroscopic and laproscopic (1-2 hours) filed at 05/05/2025 1100   BMI (BMI MUST BE CHOSEN) 30 or less filed at 05/05/2025 1100          Modified Frailty Index    No data to display       [id number] Stroke Risk Points  Current as of 18 minutes ago        N/A 0 to 9 Points:      Last Change: N/A          The AHV1CQ2-WNOo risk score (Lip LUIS ANGEL, et al. 2009. © 2010 American [employer]) quantifies the risk of stroke for a patient with atrial fibrillation. For patients without atrial fibrillation or under the age of 18 this score appears as N/A. Higher score values generally indicate higher risk of stroke.        This score is not applicable to this patient. Components are not calculated.          Revised Cardiac Risk Index      Flowsheet Row Pre-Admission Testing from 5/5/2025 in [facility]   High-Risk Surgery (Intraperitoneal, Intrathoracic,Suprainguinal vascular) 0 filed at 05/05/2025 1101   History of ischemic heart disease (History of MI, History of positive exercuse test, Current chest paint considered due to myocardial ischemia, Use of nitrate therapy, ECG with pathological Q Waves) 0 filed at 05/05/2025 1101   History of congestive heart failure (pulmonary edemia, bilateral rales or S3 gallop, Paroxysmal nocturnal dyspnea, CXR showing pulmonary vascular redistribution) 0 filed at 05/05/2025 1101   History of cerebrovascular disease (Prior TIA or stroke) 0 filed at 05/05/2025 1101   Pre-operative insulin treatment 0 filed at 05/05/2025 1101   Pre-operative creatinine>2 mg/dl 0 filed at 05/05/2025 1101   Revised Cardiac Risk Calculator 0 filed at 05/05/2025 1101          Apfel Simplified Score      Flowsheet Row Pre-Admission Testing from 5/5/2025 in [facility]   Smoking status 0 filed at 05/05/2025 1101   History of motion sickness or PONV  0 filed at 05/05/2025 1101   Use of postoperative opioids 1 filed at 05/05/2025 1101   Gender - Female 1=Yes filed at 05/05/2025 1101   Apfel Simplified Score Calculator 2 filed at 05/05/2025 1101          Risk Analysis Index Results This Encounter    No data found in the last 10 encounters.       Stop Bang Score      Flowsheet Row Pre-Admission Testing from 5/5/2025 in [facility]   Do you snore loudly? 0 filed at 05/05/2025 1031   Do you often feel tired or fatigued after your sleep? 1 filed at 05/05/2025 1031   Has anyone ever observed you stop breathing in your sleep? 0 filed at 05/05/2025 1031   Do you have or are you being treated for high blood pressure? 0 filed at 05/05/2025 1031   Recent BMI (Calculated) 26.2 filed at 05/05/2025 1031   Is BMI greater than 35 kg/m2? 0=No filed at 05/05/2025 1031   Age older than 50 years old? 0=No filed at 05/05/2025 1031   Is your neck circumference greater than 17  inches (Male) or 16 inches (Female)? 0 filed at 05/05/2025 1031   Gender - Male 0=No filed at 05/05/2025 1031   STOP-BANG Total Score 1 filed at 05/05/2025 1031          Prodigy: High Risk  Total Score: 3              Prodigy Previous Opioid Use Score           ARISCAT Score for Postoperative Pulmonary Complications    No data to display       Fuentes Perioperative Risk for Myocardial Infarction or Cardiac Arrest (YOMI)    No data to display     No results found for this or any previous visit (from the past 4 weeks).     --Testing/Diagnostic:        - CT A/P: 12/02/24   Impression      Prominent hydroureter and hydronephrosis to a degree that is consistent with some level of obstruction. Air in the urinary bladder raises the possibility of vesicovaginal fistula since there is also air in the vagina.    The hydronephrosis and hydroureter would raise the possibility of local spread of the patient's cervical carcinoma into the region of the bladder trigone,though a definite mass is not identified.  Possible small uterine fibroid.    - PET CT: 12/20/24      - US Elastography of Liver: 08/12/2023  1. Mild coarsening of liver echotexture suggests underlying hepatocellular  disease, but there are no overt findings of steatosis or cirrhosis.  2. Liver stiffness of 4.84 kPa, correlating with a Metavir score of F1 (absent or mild fibrosis, 5.48-8.29 kPa).    Assessment and Plan:     Anesthesia  The patient denies problems with anesthesia in the past such as PONV, prolonged sedation, awareness, dental damage, aspiration, cardiac arrest, difficult intubation, or unexpected hospital admissions.     Airway  No documented or reported history of airway difficulty.     Neurology  The patient has anxiety managed on medication. No grossly apparent neurological perioperative risk.     The patient is at increased risk for perioperative stroke secondary to female gender, general anesthesia. Handouts for preoperative brain exercises given  to patient.    HEENT  No diagnoses or significant findings on chart review or clinical presentation and evaluation.    Cardiovascular  No diagnoses or significant findings on chart review or clinical presentation and evaluation.    METS  The patient's functional capacity is greater than 4 METS.  RCRI  The patient meets 0 RCRI criteria and therefor has a 3.9% risk of major adverse cardiac complications.  YOMI score which indicates a 0.4% risk of intraoperative or 30-day postoperative MACE (major adverse cardiac event).    Cardiology Evaluation  The patient is not followed by cardiology.    She is scheduled for non-cardiac surgery associated with elevated risk. The patient has no major cardiac contraindications to non- cardiac surgery.    Pulmonary  No significant findings on chart review or clinical presentation and evaluation. The patient is at increased risk of perioperative pulmonary complications secondary to ongoing tobacco abuse. Smoking cessation recommended.     STOP BANG 1, which places patient at low risk for having ALYSSA.  ARISCAT 16, low, 1.6% risk of in-hospital postoperative pulmonary complications  PRODIGY 0, low risk of respiratory depression episode.     Patient given PI sheet for preoperative deep breathing exercises.  Encourage  incentive spirometry in the postoperative period as deemed necessary.    Endocrine  No diagnoses or significant findings on chart review or clinical presentation and evaluation.    Gastrointestinal  The patient has history ofhep C, not treated. CMP shows LFTs within normal limits.     Gastro: Marlys Gallego 09/21/23......per note, Hx of Hep C, She states that she has never been treated since her diagnosis of hepatitis C. Her hepatitis C genotype is 1a, her viral load is 6.1 million.   Plan:  Hepatitis C antibody positive in blood  -     External Referral To Infectious Disease; Dr. Cat.       - She prefers to stay local and within minutes rather than travel to [place].    -  US elastography shows no evidence of cirrhosis at this time     2. F/U in 3 months or sooner as needed     Eat 10- 0,  self-perceived oropharyngeal dysphagia scale (0-40)     Genitourinary  The patient has Stage CORNELIA cervical SCC, cT4 cN1a cM0, with vesicovaginal fistula and obstructive uropathy s/p bilateral nephrostomy. She also has a PET avid right external iliac lymph node. She started on carbotaxol on 1/15/2025 and completed 2 cycles.   Rad Onc: Alyssa Rojas 04/07/25     Per Note:  -s/p induction carbotaxol as per interlace trial  -Continue chemoRT with Dr. Vaca  -Obtain DICOM from Dr. Vaca     Brachytherapy planning  -Discussed with Ms. Manzano that I will be leaving institution next month and her procedure will likely be performed by Dr. Perez.   -RTC week 4-5 of chemoRT to meet Dr. Perez with MRI pelvis and plan for brachytherapy accordingly  -She has signed consent today for treatment.   -Brachytherapy appt, PAT, EOT MRI, MRI for procedure ordered    HemeOnc: Jonathan Yin  at [facility] 03/24/25 Consult for cervical cancer with right external iliac lymphadenopathy     Plan for IR nephrostomy tube exchange on 6/2    Renal  No renal diagnoses or significant findings on chart review or clinical presentation and evaluation.    Hematology  The patient has history of anemia, thrombocytopenia, had blood transfusion 2/2025.     Caprini score 6, high risk of perioperative VTE.     Patient instructed to ambulate as soon as possible postoperatively to decrease thromboembolic risk. Initiate mechanical DVT prophylaxis as soon as possible and initiate chemical prophylaxis when deemed safe from a bleeding standpoint post surgery.     Transfusion Evaluation  T&S not obtained. Low likelihood for perioperative transfusion of blood or blood products.    Musculoskeletal  No diagnoses or significant findings on chart review or clinical presentation and evaluation.    ID  No diagnoses or significant findings on chart  review or clinical presentation and evaluation.    -Preoperative medication instructions were provided and reviewed with the patient.  Any additional testing or evaluation was explained to the patient.  NPO Instructions were discussed, and the patient's questions were answered prior to conclusion of this encounter. Patient verbalized understanding of preoperative instructions. After Visit Summary given.      5/5 1520 reviewed labs, Patient WBCs 1.4, Plts 5, H/H 5/8/17.7. Patient notified, directed to ER. Dr. Rojas sent staff message regarding findings.    Patient received 2u RBCs at OSH her brachytherapy has been cancelled until labs have stabilized per Dr. Rojas.           [1]   Past Medical History:  Diagnosis Date    Anemia     Anxiety     Cervical cancer     Hepatitis C     untreated, likely contracted through IVDU    History of blood transfusion     Transfused Feb 2025    History of chemotherapy     Hydronephrosis     s/p bilateral nephrostomy tube placement for hydronephrosis due to obstructive cervical ca.    Thrombocytopenia (CMS-HCC)    [2]   Past Surgical History:  Procedure Laterality Date    MEDIPORT     [3] No family history on file.  [4]   Allergies  Allergen Reactions    Sulfamethoxazole-Trimethoprim Hives

## 2025-05-05 NOTE — PREPROCEDURE INSTRUCTIONS
Fasting Guidelines    NPO Instructions:    With sedation, food or liquid in your stomach can enter your lungs causing serious complications  Increases nausea and vomiting    When do I need to stop eating and drinking before my surgery?  Do not eat any food after midnight the night before your surgery/procedure  You may have up to 13.5 ounces of clear liquids until TWO hours before your instructed arrival time to the hospital. This includes water, black tea/coffee (no milk or creamer), apple juice, and electrolyte replacement beverages (Gatorade)  You may chew gum until TWO hours before your surgery/procedure    Additional Instructions:     Avoid herbal supplements, multivitamins and NSAIDS (non-steroidal anti-inflammatory drugs) such as Advil, Aleve, Ibuprofen, Naproxen, Excedrin, Meloxicam or Celebrex for at least 7 days prior to surgery. May take Tylenol as needed.    Avoid tobacco and alcohol products for 24 hours prior to surgery.    CONTACT SURGEON'S OFFICE IF YOU DEVELOP:  * Fever = 100.4 F   * New respiratory symptoms (e.g. cough, shortness of breath, respiratory distress, sore throat)  * Recent loss of taste or smell  *Flu like symptoms such as headache, fatigue or gastrointestinal symptoms  * You develop any open sores, shingles, burning or painful urination   AND/OR:  * You no longer wish to have the surgery.  * Any other personal circumstances change that may lead to the need to cancel or defer this surgery.  *You were admitted to any hospital within one week of your planned procedure.    Seven/Six Days before Surgery:  Review your medication instructions, stop indicated medications    Day of Surgery:  Review your medication instructions, take indicated medications  Wear comfortable loose fitting clothing  Do not use moisturizers, creams, lotions or perfume  All jewelry and valuables should be left at home    [facility]  [facility]  Oputt-[phone]  [phone]  Email-[email or web address]              Preoperative Brain Exercises    What are brain exercises?  A brain exercise is any activity that engages your thinking (cognitive) skills.    What types of activities are considered brain exercises?  Jigsaw puzzles, crossword puzzles, word jumble, memory games, word search, and many more.  Many can be found free online or on your phone via a mobile yousif.    Why should I do brain exercises before my surgery?  More recent research has shown brain exercise before surgery can lower the risk of postoperative delirium (confusion) which can be especially important for older adults.  Patients who did brain exercises for 5 to 10 hours the days before surgery, cut their risk of postoperative delirium in half up to 1 week after surgery.         [facility]    Preoperative Deep Breathing Exercises    Why it is important to do deep breathing exercises before my surgery?  Deep breathing exercises strengthen your breathing muscles.  This helps you to recover after your surgery and decreases the chance of breathing complications.      How are the deep breathing exercises done?  Sit straight with your back supported.  Breathe in deeply and slowly through your nose. Your lower rib cage should expand and your abdomen may move forward.  Hold that breath for 3 to 5 seconds.  Breathe out through pursed lips, slowly and completely.  Rest and repeat 10 times every hour while awake.  Rest longer if you become dizzy or lightheaded.         Patient and Family Education             Ways You Can Help Prevent Blood Clots             This handout explains some simple things you can do to help prevent blood clots.      Blood clots are blockages that can form in the body's veins. When a blood clot forms in your deep veins, it may be called a deep vein thrombosis, or DVT for short. Blood clots can happen in any part of the  body where blood flows, but they are most common in the arms and legs. If a piece of a blood clot breaks free and travels to the lungs, it is called a pulmonary embolus (PE). A PE can be a very serious problem.         Being in the hospital or having surgery can raise your chances of getting a blood clot because you may not be well enough to move around as much as you normally do.         Ways you can help prevent blood clots in the hospital         Wearing SCDs. SCDs stands for Sequential Compression Devices.   SCDs are special sleeves that wrap around your legs  They attach to a pump that fills them with air to gently squeeze your legs every few minutes.   This helps return the blood in your legs to your heart.   SCDs should only be taken off when walking or bathing.   SCDs may not be comfortable, but they can help save your life.               Wearing compression stockings - if your doctor orders them. These special snug fitting stockings gently squeeze your legs to help blood flow.       Walking. Walking helps move the blood in your legs.   If your doctor says it is ok, try walking the halls at least   5 times a day. Ask us to help you get up, so you don't fall.      Taking any blood thinning medicines your doctor orders.        Page 1 of 2     [facility]; 3/23   Ways you can help prevent blood clots at home       Wearing compression stockings - if your doctor orders them. ? Walking - to help move the blood in your legs.       Taking any blood thinning medicines your doctor orders.      Signs of a blood clot or PE      Tell your doctor or nurse know right away if you have of the problems listed below.    If you are at home, seek medical care right away. Call 911 for chest pain or problems breathing.               Signs of a blood clot (DVT) - such as pain,  swelling, redness or warmth in your arm or leg      Signs of a pulmonary embolism (PE) - such as chest     pain or feeling short of breath

## 2025-05-06 ENCOUNTER — DOCUMENTATION (OUTPATIENT)
Dept: RADIATION ONCOLOGY | Facility: HOSPITAL | Age: 40
End: 2025-05-06
Payer: COMMERCIAL

## 2025-05-06 VITALS
HEART RATE: 81 BPM | HEIGHT: 62 IN | SYSTOLIC BLOOD PRESSURE: 121 MMHG | DIASTOLIC BLOOD PRESSURE: 60 MMHG | OXYGEN SATURATION: 99 % | WEIGHT: 143 LBS | TEMPERATURE: 97.6 F | RESPIRATION RATE: 16 BRPM | BODY MASS INDEX: 26.31 KG/M2

## 2025-05-06 LAB
ABO/RH: NORMAL
ANTIBODY SCREEN: NEGATIVE
ARM BAND NUMBER: NORMAL
BLOOD BANK DISPENSE STATUS: NORMAL
BLOOD BANK DISPENSE STATUS: NORMAL
BLOOD BANK SAMPLE EXPIRATION: NORMAL
BPU ID: NORMAL
BPU ID: NORMAL
COMPONENT: NORMAL
COMPONENT: NORMAL
CROSSMATCH RESULT: NORMAL
CROSSMATCH RESULT: NORMAL
EKG ATRIAL RATE: 71 BPM
EKG P AXIS: 64 DEGREES
EKG P-R INTERVAL: 124 MS
EKG Q-T INTERVAL: 368 MS
EKG QRS DURATION: 76 MS
EKG QTC CALCULATION (BAZETT): 399 MS
EKG R AXIS: 77 DEGREES
EKG T AXIS: 57 DEGREES
EKG VENTRICULAR RATE: 71 BPM
HCT VFR BLD AUTO: 23.3 % (ref 36–46)
HGB BLD-MCNC: 8.2 G/DL (ref 12–16)
PATH REVIEW-CBC DIFFERENTIAL: NORMAL
TRANSFUSION STATUS: NORMAL
TRANSFUSION STATUS: NORMAL
UNIT DIVISION: 0
UNIT DIVISION: 0

## 2025-05-06 PROCEDURE — 85014 HEMATOCRIT: CPT

## 2025-05-06 PROCEDURE — 93010 ELECTROCARDIOGRAM REPORT: CPT | Performed by: INTERNAL MEDICINE

## 2025-05-06 PROCEDURE — 85018 HEMOGLOBIN: CPT

## 2025-05-06 NOTE — PROGRESS NOTES
Called Ms. Manzano this morning.   She reports she was treated with 2u pRBCs at OSH and sent home.   Discussed with Ms. Manzano that we are cancelling her Brachytherapy case for 05/12. We will reschedule once her labs have stabilized.   Pt expressed understanding and all questions were answered.     Furthermore, discussed her case with Dr. Yin ([facility] med onc) who is aware of her pancytopenia and will be managing.   Discussed her case with Dr. Vaca ([facility] rad onc) who is aware.     Finally, Dr. Josue Perez and Dr. José Miguel Roche (Rad Onc) are aware of case cancellation due to pancytopenia. Case will likely be rescheduled with one of them as brachytherapy provider.

## 2025-05-09 ENCOUNTER — TELEPHONE (OUTPATIENT)
Dept: GYNECOLOGIC ONCOLOGY | Age: 40
End: 2025-05-09

## 2025-05-09 NOTE — TELEPHONE ENCOUNTER
Called patient to schedule follow up with office. At this time, patient does not want to schedule an appointment. Reports everything is on hold at this time due to blood work levels decreasing. Will be going to [facility] 5/20/2025.

## 2025-05-12 ENCOUNTER — APPOINTMENT (OUTPATIENT)
Dept: RADIATION ONCOLOGY | Facility: HOSPITAL | Age: 40
End: 2025-05-12
Payer: COMMERCIAL

## 2025-05-12 ENCOUNTER — APPOINTMENT (OUTPATIENT)
Dept: RADIOLOGY | Facility: HOSPITAL | Age: 40
End: 2025-05-12
Payer: COMMERCIAL

## 2025-05-12 ENCOUNTER — TELEMEDICINE (OUTPATIENT)
Dept: ONCOLOGY | Age: 40
End: 2025-05-12
Payer: COMMERCIAL

## 2025-05-12 ENCOUNTER — TELEPHONE (OUTPATIENT)
Dept: ONCOLOGY | Age: 40
End: 2025-05-12

## 2025-05-12 DIAGNOSIS — C77.5 METASTASIS TO ILIAC LYMPH NODE (HCC): Primary | ICD-10-CM

## 2025-05-12 DIAGNOSIS — D75.89 MYELOSUPPRESSION AFTER CHEMOTHERAPY: ICD-10-CM

## 2025-05-12 DIAGNOSIS — T45.1X5A MYELOSUPPRESSION AFTER CHEMOTHERAPY: ICD-10-CM

## 2025-05-12 DIAGNOSIS — D64.81 ANEMIA ASSOCIATED WITH CHEMOTHERAPY: ICD-10-CM

## 2025-05-12 DIAGNOSIS — C53.0 MALIGNANT NEOPLASM OF ENDOCERVIX (HCC): ICD-10-CM

## 2025-05-12 DIAGNOSIS — T45.1X5A ANEMIA ASSOCIATED WITH CHEMOTHERAPY: ICD-10-CM

## 2025-05-12 DIAGNOSIS — N13.9 OBSTRUCTIVE UROPATHY: ICD-10-CM

## 2025-05-12 PROCEDURE — 99215 OFFICE O/P EST HI 40 MIN: CPT | Performed by: INTERNAL MEDICINE

## 2025-05-12 NOTE — PATIENT INSTRUCTIONS
Refer to be seen sooner for nephrostomy tube exchange  Oncology nurse navigator  To assure patient will be seen by GYN oncology at [facility]  CBC CMP weekly for 2 weeks  RTC in 2 weeks

## 2025-05-12 NOTE — PROGRESS NOTES
negative for headaches, dizziness, seizures, weakness, numbness       OBJECTIVE:         There were no vitals filed for this visit.          PHYSICAL EXAMINATION:  [ INSTRUCTIONS:  \"[x]\" Indicates a positive item  \"[]\" Indicates a negative item  -- DELETE ALL ITEMS NOT EXAMINED]  Vital Signs: (As obtained by patient/caregiver or practitioner observation)    Blood pressure-  Heart rate-    Respiratory rate-    Temperature-  Pulse oximetry-     Constitutional: [x] Appears well-developed and well-nourished [] No apparent distress      [] Abnormal-   Mental status  [x] Alert and awake  [x] Oriented to person/place/time [x]Able to follow commands      Eyes:  EOM    [x]  Normal  [] Abnormal-  Sclera  []  Normal  [] Abnormal -         Discharge []  None visible  [] Abnormal -    HENT:   [x] Normocephalic, atraumatic.  [] Abnormal   [] Mouth/Throat: Mucous membranes are moist.     External Ears [] Normal  [] Abnormal-     Neck: [x] No visualized mass     Pulmonary/Chest: [x] Respiratory effort normal.  [x] No visualized signs of difficulty breathing or respiratory distress        [] Abnormal-      Musculoskeletal:   [x] Normal gait with no signs of ataxia         [x] Normal range of motion of neck        [] Abnormal-       Neurological:        [x] No Facial Asymmetry (Cranial nerve 7 motor function) (limited exam to video visit)          [x] No gaze palsy        [] Abnormal-         Skin:        [x] No significant exanthematous lesions or discoloration noted on facial skin         [] Abnormal-            Psychiatric:       [] Normal Affect [] No Hallucinations        [] Abnormal-     Other pertinent observable physical exam findings-     Due to this being a TeleHealth encounter, evaluation of the following organ systems is limited: Vitals/Constitutional/EENT/Resp/CV/GI//MS/Neuro/Skin/Heme-Lymph-Imm.    LABORATORY DATA:     Lab Results   Component Value Date    WBC 1.3 (LL) 05/05/2025    HGB 8.2 (L) 05/06/2025    HCT 23.3

## 2025-05-12 NOTE — TELEPHONE ENCOUNTER
Name: Nola Llanes  : 1985  MRN: [id number]    Oncology Navigation Follow-Up Note    Contact Type:  Telephone    Notes: Call made to Nola, who states she is scheduled for nephrostomy tube change on 25. Pt had appointment with Dr. Estevez this morning and states she is having pain at nephrostomy tube site and fever yesterday. Pt states she took ibuprofen with relief. Per Dr. Estevez pt needs to have tubes changed sooner if possible. Nola inquired about possibility of being referred to nephrologist locally or having this done closer to home. Pt informed that writer will discuss with Dr. Estevez and let her know.  Pt is scheduled at  on 25 to have brachy therapy and then possibly to see surgeon. Pt informed if fever, worsening pain, or signs of infection at nephrostomy tube site occur to go to the ER immediately. Nola verbalized understanding.    Discussed with Dr. Estevez. Per Dr. Estevez pt does not need to see nephrologist but okay to check with [place] [facility] to see if it is okay for her to have nephrostomy tubes changed in [place].    Call made to .  Spoke to Parris in IR at  [phone] option 1. Nola is scheduled for 25 at 11 am for nephrostomy tube change at  main [place]. Patient notified by phone and is aware of date and time. Pt informed that local IR will be contacted about future nephrostomy tube changes. Nola verbalized understanding.     Electronically signed by Ghada Fragoso RN on 2025 at 10:47 AM

## 2025-05-13 ENCOUNTER — APPOINTMENT (OUTPATIENT)
Dept: RADIATION ONCOLOGY | Facility: HOSPITAL | Age: 40
End: 2025-05-13
Payer: COMMERCIAL

## 2025-05-13 ENCOUNTER — TELEPHONE (OUTPATIENT)
Dept: RADIATION ONCOLOGY | Facility: HOSPITAL | Age: 40
End: 2025-05-13
Payer: COMMERCIAL

## 2025-05-13 DIAGNOSIS — C53.9 MALIGNANT NEOPLASM OF CERVIX, UNSPECIFIED SITE (MULTI): Primary | ICD-10-CM

## 2025-05-13 NOTE — PROGRESS NOTES
Radiation Oncology Follow Up Note    Patient Name:  Madalyn Manzano  MRN:  [id number]  :  1985    Diagnosis: Stage CORNELIA cervical SCC, cT4 cN1a cM0, with vesicovaginal fistula     History of Present Illness:  Madalyn Manzano is a 39 y.o. female with history of anxiety, Hep C, polysubstance abuse, KUNAL-3, with recently diagnosed Stage CORNELIA cervical SCC, cT4 cN1a cM0, with vesicovaginal fistula, obstructive uropathy now s/p bilateral nephrostomy. She reported pelvic pain and intermittent vaginal bleeding/discharge started 1.5 years ago and was treated for PH imbalance by her PCP without improvement. She was initially referred to Dr. Can at [facility] for a biopsy-proved CIN3 but was found to have a huge advanced cervical cancer and a fistula on 2024. She is seen by Dr. Quiroz at [facility] and confirmed the stage CORNELIA cervical ca.    Cancer History:   -2024 Seen by Dr. Pearce for vaginal discharge and leak of urine. Pap smear showed high risk HPV. Bx showed CIN3. Focal involvement of endocervical epithelium by the high-grade intraepithelial lesion is present.   -2024 CT CAP showed bilateral prominent hydronephrosis and hydroureter. Possible fistula between vagina and bladder.   -2024 Seen by Gyn-Onc Dr. Can for CIN3. However, on exam, she was found to have a large cervical tumor  -2024 s/p bilateral nephrostomy tube placement for hydronephrosis due to obstructive cervical ca.  2025 and 2025 seen by Med Onc Dr. Yin. Recommends induction chemo carbotaxol weekly for 6 weeks then weekly cisplatin 6 weeks with EBRT  -2024 PET CT showed abnormal activity involving endometrial canal extending to the cervical region. PET avid right external iliac lymph node.   -2025 MRI showed heterogenous enhancement and thickening of the cervix consistent with cervical ca, measuring 4.6x2.3x6.1cm. extending along the anterior wall of the vagina and likely involves the posterior wall of the  urinary bladder. Likely a fistulous tract between the urinary bladder and vagina. Bilateral pelvic sidewall and external iliac lymph nodes.   -1/16/2025 started on induction chemotherapy carbotaxol weekly for 6-week as per [facility] Protocol with Dr. Yin (med onc)   -1/27/2025 Seen by Dr. Quiroz for second opinion. Agreed with the plan.  - Initiated EBRT under Dr. Jeanne Vaca's direction    -4/7/2025 seen by Dr. Rojas and planned for brachytherapy after EBRT.  -04/22/2025 Last cisplatin  -Completed 45 Gy in 25 fractions under Dr. Jeanne Vaca's care on 04/28  -4/28/2025 MRI pelvis showed Interval decreased size of a mass within the anterior right lateral cervix with decreased right parametrial extension; persistent enhancement and diffusion restriction are compatible with residual viable disease. The mass measures 1.8 x 1.4 x 1.5 cm previously measuring 4.6 x 2.3 x 6.1 cm. Also showed near-complete resolution of neoplasm involving the anterior vaginal wall without definite evidence of residual viable disease with no pelvic lymphadenopathy.   -5/05/2025 saw by Dr. Rojas  with plan for brachytherapy on 5/12/2025 which canceled due to pancytopenia noted on PAT on 5/5/2025 (Hb <6, Plt 6). Patient was advised her to go directly to the [facility] for admission, but she declined. According to the patient, she went to an outside hospital ([facility]), where she received 3 units of PRBC and was discharged.    The patient presents today for continued discussion and repeat lab testing.    Review of Systems:   Pls also see RN note    The patient's current pain level was assessed.  They report currently having a pain of 2 out of 10.  They feel their pain is under control with the use of pain medications.  The patient has a documented plan of care to address pain.     Today she reports Today, she reports feeling more tired compared to two weeks ago but is still able to carry out her daily activities. Her appetite  is fine, and she has not experienced significant weight loss recently. She complains of discomfort from the left-sided nephrostomy tube and is scheduled for a nephrostomy tube replacement tomorrow. She continues to experience urinary leakage due to a vesicovaginal fistula and is wearing pads for that. She feels nauseous, for which she is taking medication. She denies abdominal pain or diarrhea.    Her most recent lab results from yesterday showed significant improvement in her blood counts.     Fatigue  [] None   [x] Grade 1 fatigue: Relieved by rest   [] Grade 2 fatigue: Not relieved by rest; limiting instrumental ADL  [] Grade 3 fatigue: Not relieved by rest, limiting self care ADL     GI:  Bowel complaints: None      :  Urinary complaints  S/p neph tubes which drain clear urine.  She has VVF.   Denies any urge; incontinent.      Denies vaginal bleeding     Describes hot flashes.        5   0  Smoking 1/2 pack day; occasional EtOH    Prior Radiotherapy:  Yes, describe:    See HPI    Current Systemic Treatment:  carbotaxelx2 cycles, + concurrent cisplatin, LD 2025; prior to current malignancy no other chemo      OBJECTIVE    Lab:  Lab Results   Component Value Date    WBC 4.0 (L) 2025    HGB 9.8 (L) 2025    HCT 29.5 (L) 2025     2025     (L) 2025     Lab Results   Component Value Date    GLUCOSE 109 (H) 2025    CALCIUM 9.5 2025     2025    K 4.5 2025    CO2 27 2025     2025    BUN 4 (L) 2025    CREATININE 0.61 2025            Physical Exam:  BP 94/66   Pulse (!) 112   Temp 36.2 °C (97.2 °F) (Temporal)   Resp 18   Wt 64 kg (141 lb)   SpO2 97%   BMI 25.79 kg/m²      Gen: normal appearing, in NAD  ENT: Eyes symmetric  Resp: Breathing comfortably in room air  CV: Normal perfusion  Abd: Soft and non-distended    Gyn: The patient expressed feeling tired and wants to go home as soon as  possible. She preferred not to undergo the genital exam today.  Extremities: symmetric  Skin: Normal  Extremities: symmetric  Skin: Normal    Imaging:  The pertinent imaging results were reviewed and discussed with the patient.  Notably,   MRI pelvis 1/7/2025  IMPRESSION:  SUBOPTIMAL STUDY DUE TO MOTION ARTIFACT. THERE APPEARS BE HETEROGENOUS ENHANCEMENT AND THICKENING  OF THE CERVIX CONSISTENT WITH THE HISTORY MEASURING APPROXIMATELY 4.6 X 2.3 X 6.1 CM. THE ABNORMAL  SOFT TISSUE THICKENING AND ENHANCEMENT EXTENDING ALONG THE ANTERIOR WALL OF THE VAGINA AND LIKELY  INVOLVES THE POSTERIOR WALL OF THE URINARY BLADDER. THERE IS LIKELY A FISTULOUS TRACT BETWEEN THE  URINARY BLADDER AND THE VAGINA WITH AIR PRESENT        MRI pelvis 4/28/2025  IMPRESSION:  1.  Interval decreased size of a mass within the anterior right  lateral cervix with decreased right parametrial extension; persistent  enhancement and diffusion restriction are compatible with residual  viable disease. The mass measures 1.8 x 1.4 x 1.5 cm previously  measuring 4.6 x 2.3 x 6.1 cm.  2.  Near-complete resolution of neoplasm involving the anterior  vaginal wall without definite evidence of residual viable disease.  3. Persistent vesicular vaginal fistula at the superior aspect of the  anterior vaginal wall.  4. No pelvic lymphadenopathy.        ASSESSMENT/PLAN:  Madalyn Manzano is a 39 y.o. female with history of anxiety, Hep C, polysubstance abuse, KUNAL-3, found to have stage CORNELIA cervical ca with vesicovaginal fistula and obstructive uropathy s/p bilateral nephrostomy. She also has a PET avid right external iliac lymph node. She started on carbotaxol on 1/15/2025  followed by concurrent chmoRT which completed on 4/28/2025. Most recent MRI shows Interval decreased size of a mass within the anterior right lateral cervix with decreased right parametrial extension without pelvic lymphadenopathy.     WE updated her of the lab results today which have  demonstrated sufficient recovery to proceed with brachytherapy planning.   Logistics, rationale and potential toxicities from brachytherapy were reviewed, including procedure related complications.  Patient updated the procedure consent.     We discussed the use brachytherapy in the treatment of locally advanced cervical cancer to deliver a boost dose once EBRT is completed. We discussed that brachytherapy is a form of internal radiation that uses catheters and needles to deliver sufficiently high doses to the tumor, while minimizing dose to normal organs. We explained she should plan for a 3 day hospital stay.      We discussed that the use of brachytherapy in the treatment of cervical cancer is associated with improvement in overall survival. Furthermore, we reviewed emerging data which shows improvement in overall survival and locoregional control when image-guided brachytherapy is used (in comparison to conventional brachytherapy).      We also discussed the potential acute and late toxicities including, but not limited bowel irritation (loose stools, nausea, possibly vomiting), bladder irritation (urgency, frequency, dysuria, hematuria), skin irritation, fatigue, ovarian failure/sterility in premenopausal women, long-term sexual dysfunction, changes in bowel or bladder function, bowel obstruction, decrease in blood counts and second malignancy.     We specifically discussed possible worsening of her vesicovaginal fistula and possible management options in future.       PLAN:  -Proceed with template-based interstitial/intracavitary GYN brachytherapy (MAC applicator)   -OR date (will update the patient after finalize the OR date with brachy and anesthesiology team). plan for one insertion , 3 day admission.  -Discussed bowel prep, labs needed for procedure    Discussed with attending faculty, Dr. Perez     No orders of the defined types were placed in this encounter.      Future Appointments   Date Time  Provider Department Center   2025 12:00 PM [facility] IR 2 [facility] Rad Cent         Neyda Young MD  PGY-5, Radiation Oncology  2025 2:22 PM   For  Josue Perez MD, MMM  Senior Attending Physician, [facility]  Professor, [employer]   Our [place]: “To Heal, To Teach, To Discover.”  RN partner: [phone]/ [email or web address]    Phone (scheduling): [phone]/ [email or web address]  Proton Therapy (scheduling): [phone]/ [email or web address]  Phone (after hours): [phone]    ATTENDING ADDENDUM:  I saw and evaluated the patient with the resident. I personally obtained the key and critical portions of the history and physical exam and directly counseled the patient of the treatment plan. I reviewed the resident documentation and discussed the patient with the resident. I agree with the resident's medical decision making as documented in the note.    I have contacted her medical oncologist to update him of the lab results. Communication initiated with Anesthesiology team to schedule her for the procedure.    RN team will contact patient with final plan including pre-procedure care.    LONGITUDINAL CARE, EXTRA EFFORT/ : The patient will be followed longitudinally by providers (including APPs) in the department of radiation oncology for monitoring treatment effects during and after radiation. Additional effort needed in the setting of locally advanced cancer, complex presentation and coordination of care with local providers.     Total time: 45 min  Prep: 10 min  In-room: 25 min  Chartin min  Care coordination: 5 min

## 2025-05-13 NOTE — TELEPHONE ENCOUNTER
Call placed to patient for purpose of reminder of upcoming follow-up appointment with Dr. Perez on 5/20/25 and that team would like pt to have blood drawn about 1 hour before that appointment.  Unable to reach pt by phone and voicemail not accepting messages.  Unable to send [employer] reminder since pt's status reads messages will not be received.  Will attempt to call pt again.

## 2025-05-14 ENCOUNTER — TELEPHONE (OUTPATIENT)
Dept: RADIATION ONCOLOGY | Facility: HOSPITAL | Age: 40
End: 2025-05-14
Payer: COMMERCIAL

## 2025-05-14 ENCOUNTER — HOSPITAL ENCOUNTER (OUTPATIENT)
Age: 40
Discharge: HOME | End: 2025-05-14
Payer: COMMERCIAL

## 2025-05-14 ENCOUNTER — APPOINTMENT (OUTPATIENT)
Dept: RADIATION ONCOLOGY | Facility: HOSPITAL | Age: 40
End: 2025-05-14
Payer: COMMERCIAL

## 2025-05-14 DIAGNOSIS — T45.1X5A ANEMIA ASSOCIATED WITH CHEMOTHERAPY: ICD-10-CM

## 2025-05-14 DIAGNOSIS — D64.81 ANEMIA ASSOCIATED WITH CHEMOTHERAPY: ICD-10-CM

## 2025-05-14 DIAGNOSIS — C77.5 METASTASIS TO ILIAC LYMPH NODE (HCC): ICD-10-CM

## 2025-05-14 LAB
ABSOLUTE BANDS: 0.18 K/UL (ref 0–1)
BANDS: 13 % (ref 0–10)
BASOPHILS # BLD: ABNORMAL K/UL (ref 0–0.2)
BASOPHILS NFR BLD: ABNORMAL % (ref 0–2)
EOSINOPHIL # BLD: 0.01 K/UL (ref 0–0.4)
EOSINOPHILS RELATIVE PERCENT: 1 % (ref 0–5)
ERYTHROCYTE [DISTWIDTH] IN BLOOD BY AUTOMATED COUNT: 16.5 % (ref 12.1–15.2)
HCT VFR BLD AUTO: 19.6 % (ref 36–46)
HGB BLD-MCNC: 6.8 G/DL (ref 12–16)
IMM GRANULOCYTES # BLD AUTO: ABNORMAL K/UL (ref 0–0.3)
IMM GRANULOCYTES NFR BLD: ABNORMAL %
LYMPHOCYTES NFR BLD: 0.68 K/UL (ref 1–4.8)
LYMPHOCYTES RELATIVE PERCENT: 49 % (ref 15–40)
MCH RBC QN AUTO: 34.3 PG (ref 26–34)
MCHC RBC AUTO-ENTMCNC: 34.7 G/DL (ref 31–37)
MCV RBC AUTO: 99 FL (ref 80–100)
METAMYELOCYTES ABSOLUTE COUNT: 0.06 K/UL
METAMYELOCYTES: 4 %
MONOCYTES NFR BLD: 0.2 K/UL (ref 0–1)
MONOCYTES NFR BLD: 14 % (ref 4–8)
MORPHOLOGY: ABNORMAL
NEUTROPHILS NFR BLD: 19 % (ref 47–75)
NEUTS SEG NFR BLD: 0.27 K/UL (ref 2.5–7)
PLATELET # BLD AUTO: 36 K/UL (ref 140–450)
PMV BLD AUTO: 10.8 FL (ref 6–12)
RBC # BLD AUTO: 1.98 M/UL (ref 4–5.2)
WBC OTHER # BLD: 1.4 K/UL (ref 3.5–11)

## 2025-05-14 PROCEDURE — 36415 COLL VENOUS BLD VENIPUNCTURE: CPT

## 2025-05-14 PROCEDURE — 85025 COMPLETE CBC W/AUTO DIFF WBC: CPT

## 2025-05-14 RX ORDER — HEPARIN 100 UNIT/ML
500 SYRINGE INTRAVENOUS ONCE
Status: DISCONTINUED | OUTPATIENT
Start: 2025-05-14 | End: 2025-05-15 | Stop reason: HOSPADM

## 2025-05-14 NOTE — TELEPHONE ENCOUNTER
I was able to get in touch with pt this morning and requested that she have labs drawn at [facility] on 5/20/25 about 1 hour before her 1100 follow-up visit  with Dr. Perez so lab values would be available during the visit.   Pt verbalized understanding of plan.

## 2025-05-15 ENCOUNTER — HOSPITAL ENCOUNTER (OUTPATIENT)
Dept: INFUSION THERAPY | Age: 40
Discharge: HOME | End: 2025-05-15
Payer: COMMERCIAL

## 2025-05-15 VITALS
DIASTOLIC BLOOD PRESSURE: 55 MMHG | TEMPERATURE: 97.9 F | HEART RATE: 93 BPM | SYSTOLIC BLOOD PRESSURE: 104 MMHG | RESPIRATION RATE: 16 BRPM | OXYGEN SATURATION: 100 %

## 2025-05-15 DIAGNOSIS — C77.5 METASTASIS TO ILIAC LYMPH NODE (HCC): Primary | ICD-10-CM

## 2025-05-15 DIAGNOSIS — C53.0 MALIGNANT NEOPLASM OF ENDOCERVIX (HCC): Primary | ICD-10-CM

## 2025-05-15 DIAGNOSIS — C53.0 MALIGNANT NEOPLASM OF ENDOCERVIX (HCC): ICD-10-CM

## 2025-05-15 LAB
ABO/RH: NORMAL
ANTIBODY SCREEN: NEGATIVE
BLOOD BANK DISPENSE STATUS: NORMAL
BLOOD BANK SAMPLE EXPIRATION: NORMAL
BPU ID: NORMAL
COMPONENT: NORMAL
CROSSMATCH RESULT: NORMAL
TRANSFUSION STATUS: NORMAL
UNIT DIVISION: 0

## 2025-05-15 PROCEDURE — 36430 TRANSFUSION BLD/BLD COMPNT: CPT

## 2025-05-15 PROCEDURE — 86850 RBC ANTIBODY SCREEN: CPT

## 2025-05-15 PROCEDURE — 2580000003 HC RX 258: Performed by: INTERNAL MEDICINE

## 2025-05-15 PROCEDURE — 86901 BLOOD TYPING SEROLOGIC RH(D): CPT

## 2025-05-15 PROCEDURE — P9016 RBC LEUKOCYTES REDUCED: HCPCS

## 2025-05-15 PROCEDURE — 86920 COMPATIBILITY TEST SPIN: CPT

## 2025-05-15 PROCEDURE — 86900 BLOOD TYPING SEROLOGIC ABO: CPT

## 2025-05-15 RX ORDER — ALBUTEROL SULFATE 90 UG/1
4 INHALANT RESPIRATORY (INHALATION) PRN
OUTPATIENT
Start: 2025-05-15

## 2025-05-15 RX ORDER — HEPARIN 100 UNIT/ML
500 SYRINGE INTRAVENOUS PRN
Status: CANCELLED | OUTPATIENT
Start: 2025-05-15

## 2025-05-15 RX ORDER — SODIUM CHLORIDE 9 MG/ML
25 INJECTION, SOLUTION INTRAVENOUS PRN
OUTPATIENT
Start: 2025-05-15

## 2025-05-15 RX ORDER — ACETAMINOPHEN 325 MG/1
650 TABLET ORAL
OUTPATIENT
Start: 2025-05-15

## 2025-05-15 RX ORDER — ONDANSETRON 2 MG/ML
8 INJECTION INTRAMUSCULAR; INTRAVENOUS
OUTPATIENT
Start: 2025-05-15

## 2025-05-15 RX ORDER — DIPHENHYDRAMINE HYDROCHLORIDE 50 MG/ML
50 INJECTION, SOLUTION INTRAMUSCULAR; INTRAVENOUS
OUTPATIENT
Start: 2025-05-15

## 2025-05-15 RX ORDER — SODIUM CHLORIDE 9 MG/ML
INJECTION, SOLUTION INTRAVENOUS CONTINUOUS
OUTPATIENT
Start: 2025-05-15

## 2025-05-15 RX ORDER — SODIUM CHLORIDE 0.9 % (FLUSH) 0.9 %
5-40 SYRINGE (ML) INJECTION PRN
Status: DISCONTINUED | OUTPATIENT
Start: 2025-05-15 | End: 2025-05-16 | Stop reason: HOSPADM

## 2025-05-15 RX ORDER — EPINEPHRINE 1 MG/ML
0.3 INJECTION, SOLUTION INTRAMUSCULAR; SUBCUTANEOUS PRN
OUTPATIENT
Start: 2025-05-15

## 2025-05-15 RX ORDER — HYDROCORTISONE SODIUM SUCCINATE 100 MG/2ML
100 INJECTION INTRAMUSCULAR; INTRAVENOUS
OUTPATIENT
Start: 2025-05-15

## 2025-05-15 RX ORDER — SODIUM CHLORIDE 0.9 % (FLUSH) 0.9 %
5-40 SYRINGE (ML) INJECTION PRN
Status: CANCELLED | OUTPATIENT
Start: 2025-05-15

## 2025-05-15 RX ORDER — HEPARIN 100 UNIT/ML
500 SYRINGE INTRAVENOUS PRN
Status: DISCONTINUED | OUTPATIENT
Start: 2025-05-15 | End: 2025-05-16 | Stop reason: HOSPADM

## 2025-05-15 RX ORDER — SODIUM CHLORIDE 9 MG/ML
INJECTION, SOLUTION INTRAVENOUS PRN
Status: COMPLETED | OUTPATIENT
Start: 2025-05-15 | End: 2025-05-15

## 2025-05-15 RX ADMIN — SODIUM CHLORIDE: 0.9 INJECTION, SOLUTION INTRAVENOUS at 11:41

## 2025-05-15 NOTE — PROGRESS NOTES
Patient arrives for blood transfusion. Ambulates to room 202 with steady gait. Fresh fruit cup ordered from kitchen. Patient denies further needs at this time.

## 2025-05-19 ENCOUNTER — TELEPHONE (OUTPATIENT)
Dept: RADIATION ONCOLOGY | Facility: HOSPITAL | Age: 40
End: 2025-05-19
Payer: COMMERCIAL

## 2025-05-19 ENCOUNTER — HOSPITAL ENCOUNTER (OUTPATIENT)
Dept: INFUSION THERAPY | Age: 40
Discharge: HOME | End: 2025-05-19
Payer: COMMERCIAL

## 2025-05-19 DIAGNOSIS — C53.0 MALIGNANT NEOPLASM OF ENDOCERVIX (HCC): Primary | ICD-10-CM

## 2025-05-19 DIAGNOSIS — C77.5 METASTASIS TO ILIAC LYMPH NODE (HCC): ICD-10-CM

## 2025-05-19 DIAGNOSIS — T45.1X5A ANEMIA ASSOCIATED WITH CHEMOTHERAPY: ICD-10-CM

## 2025-05-19 DIAGNOSIS — D64.81 ANEMIA ASSOCIATED WITH CHEMOTHERAPY: ICD-10-CM

## 2025-05-19 LAB
ABSOLUTE BANDS: 0.3 K/UL (ref 0–1)
BANDS: 11 % (ref 0–10)
BASOPHILS # BLD: ABNORMAL K/UL (ref 0–0.2)
BASOPHILS NFR BLD: ABNORMAL % (ref 0–2)
EOSINOPHIL # BLD: 0.03 K/UL (ref 0–0.4)
EOSINOPHILS RELATIVE PERCENT: 1 % (ref 0–5)
ERYTHROCYTE [DISTWIDTH] IN BLOOD BY AUTOMATED COUNT: 17.7 % (ref 12.1–15.2)
HCT VFR BLD AUTO: 27.9 % (ref 36–46)
HGB BLD-MCNC: 9.6 G/DL (ref 12–16)
IMM GRANULOCYTES # BLD AUTO: ABNORMAL K/UL (ref 0–0.3)
IMM GRANULOCYTES NFR BLD: ABNORMAL %
LYMPHOCYTES NFR BLD: 0.57 K/UL (ref 1–4.8)
LYMPHOCYTES RELATIVE PERCENT: 21 % (ref 15–40)
MCH RBC QN AUTO: 33 PG (ref 26–34)
MCHC RBC AUTO-ENTMCNC: 34.4 G/DL (ref 31–37)
MCV RBC AUTO: 95.9 FL (ref 80–100)
METAMYELOCYTES ABSOLUTE COUNT: 0.08 K/UL
METAMYELOCYTES: 3 %
MONOCYTES NFR BLD: 0.43 K/UL (ref 0–1)
MONOCYTES NFR BLD: 16 % (ref 4–8)
MORPHOLOGY: ABNORMAL
MORPHOLOGY: ABNORMAL
MYELOCYTES ABSOLUTE COUNT: 0.08 K/UL
MYELOCYTES: 3 %
NEUTROPHILS NFR BLD: 45 % (ref 47–75)
NEUTS SEG NFR BLD: 1.21 K/UL (ref 2.5–7)
PLATELET # BLD AUTO: 129 K/UL (ref 140–450)
PMV BLD AUTO: 10.6 FL (ref 6–12)
RBC # BLD AUTO: 2.91 M/UL (ref 4–5.2)
WBC OTHER # BLD: 2.7 K/UL (ref 3.5–11)

## 2025-05-19 PROCEDURE — 6360000002 HC RX W HCPCS: Performed by: INTERNAL MEDICINE

## 2025-05-19 PROCEDURE — 2500000003 HC RX 250 WO HCPCS: Performed by: INTERNAL MEDICINE

## 2025-05-19 PROCEDURE — 85025 COMPLETE CBC W/AUTO DIFF WBC: CPT

## 2025-05-19 PROCEDURE — 96523 IRRIG DRUG DELIVERY DEVICE: CPT

## 2025-05-19 RX ORDER — ACETAMINOPHEN 325 MG/1
650 TABLET ORAL
OUTPATIENT
Start: 2025-05-19

## 2025-05-19 RX ORDER — DIPHENHYDRAMINE HYDROCHLORIDE 50 MG/ML
50 INJECTION, SOLUTION INTRAMUSCULAR; INTRAVENOUS
OUTPATIENT
Start: 2025-05-19

## 2025-05-19 RX ORDER — ONDANSETRON 2 MG/ML
8 INJECTION INTRAMUSCULAR; INTRAVENOUS
OUTPATIENT
Start: 2025-05-19

## 2025-05-19 RX ORDER — HYDROCORTISONE SODIUM SUCCINATE 100 MG/2ML
100 INJECTION INTRAMUSCULAR; INTRAVENOUS
OUTPATIENT
Start: 2025-05-19

## 2025-05-19 RX ORDER — SODIUM CHLORIDE 0.9 % (FLUSH) 0.9 %
5-40 SYRINGE (ML) INJECTION PRN
OUTPATIENT
Start: 2025-05-19

## 2025-05-19 RX ORDER — HEPARIN 100 UNIT/ML
500 SYRINGE INTRAVENOUS PRN
OUTPATIENT
Start: 2025-05-19

## 2025-05-19 RX ORDER — HEPARIN 100 UNIT/ML
500 SYRINGE INTRAVENOUS PRN
Status: DISCONTINUED | OUTPATIENT
Start: 2025-05-19 | End: 2025-05-20 | Stop reason: HOSPADM

## 2025-05-19 RX ORDER — SODIUM CHLORIDE 0.9 % (FLUSH) 0.9 %
5-40 SYRINGE (ML) INJECTION PRN
Status: DISCONTINUED | OUTPATIENT
Start: 2025-05-19 | End: 2025-05-20 | Stop reason: HOSPADM

## 2025-05-19 RX ORDER — SODIUM CHLORIDE 9 MG/ML
INJECTION, SOLUTION INTRAVENOUS CONTINUOUS
OUTPATIENT
Start: 2025-05-19

## 2025-05-19 RX ORDER — ALBUTEROL SULFATE 90 UG/1
4 INHALANT RESPIRATORY (INHALATION) PRN
OUTPATIENT
Start: 2025-05-19

## 2025-05-19 RX ORDER — SODIUM CHLORIDE 9 MG/ML
25 INJECTION, SOLUTION INTRAVENOUS PRN
OUTPATIENT
Start: 2025-05-19

## 2025-05-19 RX ORDER — EPINEPHRINE 1 MG/ML
0.3 INJECTION, SOLUTION INTRAMUSCULAR; SUBCUTANEOUS PRN
OUTPATIENT
Start: 2025-05-19

## 2025-05-19 RX ADMIN — HEPARIN 500 UNITS: 100 SYRINGE at 12:15

## 2025-05-19 RX ADMIN — SODIUM CHLORIDE, PRESERVATIVE FREE 20 ML: 5 INJECTION INTRAVENOUS at 12:15

## 2025-05-20 ENCOUNTER — HOSPITAL ENCOUNTER (OUTPATIENT)
Dept: RADIATION ONCOLOGY | Facility: HOSPITAL | Age: 40
Setting detail: RADIATION/ONCOLOGY SERIES
Discharge: HOME | End: 2025-05-20
Payer: COMMERCIAL

## 2025-05-20 ENCOUNTER — LAB (OUTPATIENT)
Dept: LAB | Facility: HOSPITAL | Age: 40
End: 2025-05-20
Payer: COMMERCIAL

## 2025-05-20 VITALS
TEMPERATURE: 97.2 F | SYSTOLIC BLOOD PRESSURE: 94 MMHG | RESPIRATION RATE: 18 BRPM | HEART RATE: 112 BPM | BODY MASS INDEX: 25.79 KG/M2 | DIASTOLIC BLOOD PRESSURE: 66 MMHG | OXYGEN SATURATION: 97 % | WEIGHT: 141 LBS

## 2025-05-20 DIAGNOSIS — C53.9 CERVICAL CANCER, FIGO STAGE IVA: Primary | ICD-10-CM

## 2025-05-20 DIAGNOSIS — C53.9 MALIGNANT NEOPLASM OF CERVIX, UNSPECIFIED SITE (MULTI): ICD-10-CM

## 2025-05-20 LAB
ANION GAP SERPL CALC-SCNC: 14 MMOL/L (ref 10–20)
BASOPHILS # BLD MANUAL: 0 X10*3/UL (ref 0–0.1)
BASOPHILS NFR BLD MANUAL: 0 %
BUN SERPL-MCNC: 4 MG/DL (ref 6–23)
CALCIUM SERPL-MCNC: 9.5 MG/DL (ref 8.6–10.3)
CHLORIDE SERPL-SCNC: 104 MMOL/L (ref 98–107)
CO2 SERPL-SCNC: 27 MMOL/L (ref 21–32)
CREAT SERPL-MCNC: 0.61 MG/DL (ref 0.5–1.05)
EGFRCR SERPLBLD CKD-EPI 2021: >90 ML/MIN/1.73M*2
EOSINOPHIL # BLD MANUAL: 0 X10*3/UL (ref 0–0.7)
EOSINOPHIL NFR BLD MANUAL: 0 %
ERYTHROCYTE [DISTWIDTH] IN BLOOD BY AUTOMATED COUNT: 18.7 % (ref 11.5–14.5)
GLUCOSE SERPL-MCNC: 109 MG/DL (ref 74–99)
HCT VFR BLD AUTO: 29.5 % (ref 36–46)
HGB BLD-MCNC: 9.8 G/DL (ref 12–16)
IMM GRANULOCYTES # BLD AUTO: 0.27 X10*3/UL (ref 0–0.7)
IMM GRANULOCYTES NFR BLD AUTO: 6.8 % (ref 0–0.9)
LYMPHOCYTES # BLD MANUAL: 0.44 X10*3/UL (ref 1.2–4.8)
LYMPHOCYTES NFR BLD MANUAL: 11 %
MAGNESIUM SERPL-MCNC: 1.44 MG/DL (ref 1.6–2.4)
MCH RBC QN AUTO: 33.3 PG (ref 26–34)
MCHC RBC AUTO-ENTMCNC: 33.2 G/DL (ref 32–36)
MCV RBC AUTO: 100 FL (ref 80–100)
METAMYELOCYTES # BLD MANUAL: 0.32 X10*3/UL
METAMYELOCYTES NFR BLD MANUAL: 8 %
MONOCYTES # BLD MANUAL: 0.6 X10*3/UL (ref 0.1–1)
MONOCYTES NFR BLD MANUAL: 15 %
MYELOCYTES # BLD MANUAL: 0.16 X10*3/UL
MYELOCYTES NFR BLD MANUAL: 4 %
NEUTROPHILS # BLD MANUAL: 2.48 X10*3/UL (ref 1.2–7.7)
NEUTS BAND # BLD MANUAL: 0.04 X10*3/UL (ref 0–0.7)
NEUTS BAND NFR BLD MANUAL: 1 %
NEUTS SEG # BLD MANUAL: 2.44 X10*3/UL (ref 1.2–7)
NEUTS SEG NFR BLD MANUAL: 61 %
NRBC BLD-RTO: 1 /100 WBCS (ref 0–0)
OVALOCYTES BLD QL SMEAR: ABNORMAL
PHOSPHATE SERPL-MCNC: 4.4 MG/DL (ref 2.5–4.9)
PLATELET # BLD AUTO: 135 X10*3/UL (ref 150–450)
POLYCHROMASIA BLD QL SMEAR: ABNORMAL
POTASSIUM SERPL-SCNC: 4.5 MMOL/L (ref 3.5–5.3)
RBC # BLD AUTO: 2.94 X10*6/UL (ref 4–5.2)
RBC MORPH BLD: ABNORMAL
SODIUM SERPL-SCNC: 140 MMOL/L (ref 136–145)
TOTAL CELLS COUNTED BLD: 100
WBC # BLD AUTO: 4 X10*3/UL (ref 4.4–11.3)

## 2025-05-20 PROCEDURE — 84100 ASSAY OF PHOSPHORUS: CPT

## 2025-05-20 PROCEDURE — 99215 OFFICE O/P EST HI 40 MIN: CPT | Performed by: RADIOLOGY

## 2025-05-20 PROCEDURE — 83735 ASSAY OF MAGNESIUM: CPT

## 2025-05-20 PROCEDURE — 85027 COMPLETE CBC AUTOMATED: CPT

## 2025-05-20 PROCEDURE — 85007 BL SMEAR W/DIFF WBC COUNT: CPT

## 2025-05-20 PROCEDURE — 80048 BASIC METABOLIC PNL TOTAL CA: CPT

## 2025-05-20 RX ORDER — LORAZEPAM 1 MG/1
1 TABLET ORAL EVERY 6 HOURS PRN
COMMUNITY

## 2025-05-20 ASSESSMENT — ENCOUNTER SYMPTOMS
EYES NEGATIVE: 1
BACK PAIN: 1
NEUROLOGICAL NEGATIVE: 1
RESPIRATORY NEGATIVE: 1
CARDIOVASCULAR NEGATIVE: 1
NAUSEA: 1
HEMATOLOGIC/LYMPHATIC NEGATIVE: 1
PSYCHIATRIC NEGATIVE: 1
CONSTITUTIONAL NEGATIVE: 1

## 2025-05-20 ASSESSMENT — PATIENT HEALTH QUESTIONNAIRE - PHQ9
1. LITTLE INTEREST OR PLEASURE IN DOING THINGS: NOT AT ALL
SUM OF ALL RESPONSES TO PHQ9 QUESTIONS 1 AND 2: 0
2. FEELING DOWN, DEPRESSED OR HOPELESS: NOT AT ALL

## 2025-05-20 ASSESSMENT — COLUMBIA-SUICIDE SEVERITY RATING SCALE - C-SSRS
1. IN THE PAST MONTH, HAVE YOU WISHED YOU WERE DEAD OR WISHED YOU COULD GO TO SLEEP AND NOT WAKE UP?: NO
2. HAVE YOU ACTUALLY HAD ANY THOUGHTS OF KILLING YOURSELF?: NO
6. HAVE YOU EVER DONE ANYTHING, STARTED TO DO ANYTHING, OR PREPARED TO DO ANYTHING TO END YOUR LIFE?: NO

## 2025-05-20 ASSESSMENT — PAIN SCALES - GENERAL: PAINLEVEL_OUTOF10: 3

## 2025-05-20 NOTE — PROGRESS NOTES
Radiation Oncology Nursing Note    Pain: The patient's current pain level was assessed.  They report currently having a pain of 6 out of 10.  They feel their pain is under control with the use of pain medications.    Review of Systems:  Review of Systems   Constitutional: Negative.    HENT:  Negative.     Eyes: Negative.    Respiratory: Negative.     Cardiovascular: Negative.    Gastrointestinal:  Positive for nausea.   Genitourinary:  Positive for bladder incontinence (d/t fistula).         Bilateral nephro tubes since 12/13/2024; tubes to be changed tomorrow   Musculoskeletal:  Positive for back pain (from bilateral nephro tubes).   Skin: Negative.    Neurological: Negative.    Hematological: Negative.    Psychiatric/Behavioral: Negative.        Patient here alone as a FUV to discuss interstitial brachy.  Patient has been feeling fairly well except for nausea.  Patient has bilateral nephro tubes.

## 2025-05-21 ENCOUNTER — HOSPITAL ENCOUNTER (OUTPATIENT)
Dept: RADIOLOGY | Facility: HOSPITAL | Age: 40
Discharge: HOME | End: 2025-05-21
Payer: COMMERCIAL

## 2025-05-21 VITALS
OXYGEN SATURATION: 98 % | RESPIRATION RATE: 15 BRPM | SYSTOLIC BLOOD PRESSURE: 102 MMHG | HEART RATE: 74 BPM | TEMPERATURE: 97.3 F | DIASTOLIC BLOOD PRESSURE: 80 MMHG

## 2025-05-21 DIAGNOSIS — N13.30 UNSPECIFIED HYDRONEPHROSIS: ICD-10-CM

## 2025-05-21 DIAGNOSIS — C53.9 MALIGNANT NEOPLASM OF CERVIX UTERI, UNSPECIFIED: ICD-10-CM

## 2025-05-21 PROCEDURE — 7100000009 HC PHASE TWO TIME - INITIAL BASE CHARGE

## 2025-05-21 PROCEDURE — 50435 EXCHANGE NEPHROSTOMY CATH: CPT | Mod: RT | Performed by: RADIOLOGY

## 2025-05-21 PROCEDURE — 99152 MOD SED SAME PHYS/QHP 5/>YRS: CPT

## 2025-05-21 PROCEDURE — 2550000001 HC RX 255 CONTRASTS: Mod: JZ | Performed by: RADIOLOGY

## 2025-05-21 PROCEDURE — C1729 CATH, DRAINAGE: HCPCS

## 2025-05-21 PROCEDURE — 99153 MOD SED SAME PHYS/QHP EA: CPT

## 2025-05-21 PROCEDURE — 2720000007 HC OR 272 NO HCPCS

## 2025-05-21 PROCEDURE — 7100000010 HC PHASE TWO TIME - EACH INCREMENTAL 1 MINUTE

## 2025-05-21 PROCEDURE — 2500000004 HC RX 250 GENERAL PHARMACY W/ HCPCS (ALT 636 FOR OP/ED): Mod: JZ | Performed by: RADIOLOGY

## 2025-05-21 PROCEDURE — 99152 MOD SED SAME PHYS/QHP 5/>YRS: CPT | Performed by: RADIOLOGY

## 2025-05-21 PROCEDURE — C1769 GUIDE WIRE: HCPCS

## 2025-05-21 RX ORDER — CEFTRIAXONE 1 G/1
INJECTION, POWDER, FOR SOLUTION INTRAMUSCULAR; INTRAVENOUS
Status: COMPLETED | OUTPATIENT
Start: 2025-05-21 | End: 2025-05-21

## 2025-05-21 RX ORDER — MIDAZOLAM HYDROCHLORIDE 1 MG/ML
INJECTION INTRAMUSCULAR; INTRAVENOUS
Status: COMPLETED | OUTPATIENT
Start: 2025-05-21 | End: 2025-05-21

## 2025-05-21 RX ORDER — FENTANYL CITRATE 50 UG/ML
INJECTION, SOLUTION INTRAMUSCULAR; INTRAVENOUS
Status: COMPLETED | OUTPATIENT
Start: 2025-05-21 | End: 2025-05-21

## 2025-05-21 RX ADMIN — IOHEXOL 50 ML: 350 INJECTION, SOLUTION INTRAVENOUS at 12:56

## 2025-05-21 RX ADMIN — MIDAZOLAM HYDROCHLORIDE 1 MG: 2 INJECTION, SOLUTION INTRAMUSCULAR; INTRAVENOUS at 12:21

## 2025-05-21 RX ADMIN — CEFTRIAXONE SODIUM 2 G: 1 INJECTION, POWDER, FOR SOLUTION INTRAMUSCULAR; INTRAVENOUS at 12:13

## 2025-05-21 RX ADMIN — FENTANYL CITRATE 50 MCG: 50 INJECTION, SOLUTION INTRAMUSCULAR; INTRAVENOUS at 12:21

## 2025-05-21 ASSESSMENT — PAIN SCALES - GENERAL

## 2025-05-21 NOTE — PRE-PROCEDURE NOTE
Interventional Radiology Preprocedure Note    Percutaneous nephrostomy tube exchange, bilateral    Indication for procedure: Diagnoses of Unspecified hydronephrosis and Malignant neoplasm of cervix uteri, unspecified were pertinent to this visit.    Relevant review of systems: NA    Relevant Labs:   Lab Results   Component Value Date    CREATININE 0.61 05/20/2025    EGFR >90 05/20/2025       Planned Sedation/Anesthesia: Moderate    Airway assessment: normal    Directed physical examination:    Aox3.  Resting comfortably in bed.  Respiratory rate/effort within normal limits.  Chest wall skin clean/intact.      Mallampati: II (hard and soft palate, upper portion of tonsils and uvula visible)    ASA Score: ASA 2 - Patient with mild systemic disease with no functional limitations    Benefits, risks and alternatives of procedure and planned sedation have been discussed with the patient and/or their representative. All questions answered and they agree to proceed.     Reviewed and approved by SOURAV WHITLEY on 5/21/25 at 11:49 AM.

## 2025-05-21 NOTE — DISCHARGE INSTRUCTIONS
You received moderate sedation:  - Do not drive a car, or operate any machinery or power tools of any kind.  - Do not drink any alcoholic drinks.  - Do not take any over the counter medications that may cause drowsiness.  - Do not make any important decisions or sign any legal documents.  - You need to have a responsible adult accompany you home.  - You may resume your normal diet.  - We strongly suggest that a responsible adult be with you for the rest of the day and also during the night. This is for your protection and safety.     For questions related to your procedure:  Please call [phone] between the hours of 7:00am-5:00pm Monday through Friday.  Please call [phone] after 5:00pm and on weekends and holidays.     In the event of an emergency call 911 or go to your nearest emergency room.

## 2025-05-21 NOTE — POST-PROCEDURE NOTE
Interventional Radiology Brief Postprocedure Note    Attending: Jerad Fields MD    Assistant: Daniele Hawkins MD    Diagnosis: Cervical cancer, bilateral percutaneous nephrostomy tubes    Description of procedure: Nephrostomy tube exchange, bilateral     Patient was prepped and draped in the usual sterile manner.  Time out was performed with team members in agreement.   image demonstrated bilateral nephrostomy tubes terminating in the renal pelvis.  Subsequently, an amplatz wire were sequentially advanced through the nephrostomy tubes without difficulty, followed by placement of new 12 F x 25 cm nephrostomy tubes.  Contrast injection confirmed intraluminal placement. Patient tolerated the procedure well. See PACS for full details.     Anesthesia:  MAC Moderate    Complications: None    Estimated Blood Loss: none    Medications (Filter: Administrations occurring from 1155 to 1300 on 05/21/25) As of 05/21/25 1300      cefTRIAXone (Rocephin) vial (g) Total dose:  2 g      Date/Time Rate/Dose/Volume Action       05/21/25  1213 2 g Given               fentaNYL PF (Sublimaze) injection (mcg) Total dose:  50 mcg      Date/Time Rate/Dose/Volume Action       05/21/25  1221 50 mcg Given               midazolam (Versed) injection (mg) Total dose:  1 mg      Date/Time Rate/Dose/Volume Action       05/21/25  1221 1 mg Given               iohexol (OMNIPaque) 350 mg iodine/mL solution 50 mL (mL) Total volume:  50 mL Dosing weight:  64      Date/Time Rate/Dose/Volume Action       05/21/25  1256 50 mL Given                   No specimens collected      See detailed result report with images in PACS.    The patient tolerated the procedure well without incident or complication and is in stable condition.     Reviewed and approved by DANIELE HAWKINS on 5/21/25 at 1:10 PM.

## 2025-05-22 ENCOUNTER — TELEPHONE (OUTPATIENT)
Dept: RADIATION ONCOLOGY | Facility: HOSPITAL | Age: 40
End: 2025-05-22
Payer: COMMERCIAL

## 2025-05-22 NOTE — TELEPHONE ENCOUNTER
Called patient to discuss her brachytherapy and the possibility of scheduling it for 5/28-5/30.  Patient stated that she should be able to make those days but will check her mom's availability to help out.

## 2025-05-26 DIAGNOSIS — C53.9 MALIGNANT NEOPLASM OF CERVIX, UNSPECIFIED SITE (MULTI): Primary | ICD-10-CM

## 2025-05-26 RX ORDER — POLYETHYLENE GLYCOL 3350, SODIUM SULFATE ANHYDROUS, SODIUM BICARBONATE, SODIUM CHLORIDE, POTASSIUM CHLORIDE 236; 22.74; 6.74; 5.86; 2.97 G/4L; G/4L; G/4L; G/4L; G/4L
4 POWDER, FOR SOLUTION ORAL ONCE
Qty: 4000 ML | Refills: 0 | Status: SHIPPED | OUTPATIENT
Start: 2025-05-26 | End: 2025-05-30 | Stop reason: HOSPADM

## 2025-05-27 ENCOUNTER — TELEPHONE (OUTPATIENT)
Dept: RADIATION ONCOLOGY | Facility: HOSPITAL | Age: 40
End: 2025-05-27
Payer: COMMERCIAL

## 2025-05-27 ENCOUNTER — TELEMEDICINE (OUTPATIENT)
Dept: ONCOLOGY | Age: 40
End: 2025-05-27
Payer: COMMERCIAL

## 2025-05-27 ENCOUNTER — HOSPITAL ENCOUNTER (OUTPATIENT)
Age: 40
Discharge: HOME | End: 2025-05-27
Payer: COMMERCIAL

## 2025-05-27 DIAGNOSIS — T45.1X5A MYELOSUPPRESSION AFTER CHEMOTHERAPY: ICD-10-CM

## 2025-05-27 DIAGNOSIS — C53.0 MALIGNANT NEOPLASM OF ENDOCERVIX (HCC): ICD-10-CM

## 2025-05-27 DIAGNOSIS — C53.9 MALIGNANT NEOPLASM OF CERVIX, UNSPECIFIED SITE (MULTI): Primary | ICD-10-CM

## 2025-05-27 DIAGNOSIS — T45.1X5A ANEMIA ASSOCIATED WITH CHEMOTHERAPY: ICD-10-CM

## 2025-05-27 DIAGNOSIS — C77.5 METASTASIS TO ILIAC LYMPH NODE (HCC): Primary | ICD-10-CM

## 2025-05-27 DIAGNOSIS — D64.81 ANEMIA ASSOCIATED WITH CHEMOTHERAPY: ICD-10-CM

## 2025-05-27 DIAGNOSIS — D69.6 THROMBOCYTOPENIA: ICD-10-CM

## 2025-05-27 DIAGNOSIS — D75.89 MYELOSUPPRESSION AFTER CHEMOTHERAPY: ICD-10-CM

## 2025-05-27 DIAGNOSIS — T45.1X5D ADVERSE EFFECT OF CHEMOTHERAPY, SUBSEQUENT ENCOUNTER: ICD-10-CM

## 2025-05-27 LAB
BASOPHILS # BLD: 0.03 K/UL (ref 0–0.2)
BASOPHILS NFR BLD: 1 % (ref 0–2)
EOSINOPHIL # BLD: 0.02 K/UL (ref 0–0.4)
EOSINOPHILS RELATIVE PERCENT: 0 % (ref 0–5)
ERYTHROCYTE [DISTWIDTH] IN BLOOD BY AUTOMATED COUNT: 23.1 % (ref 12.1–15.2)
HCT VFR BLD AUTO: 29.3 % (ref 36–46)
HGB BLD-MCNC: 9.5 G/DL (ref 12–16)
IMM GRANULOCYTES # BLD AUTO: 0.16 K/UL (ref 0–0.3)
IMM GRANULOCYTES NFR BLD: 3 % (ref 0–5)
LYMPHOCYTES NFR BLD: 0.83 K/UL (ref 1–4.8)
LYMPHOCYTES RELATIVE PERCENT: 17 % (ref 15–40)
MCH RBC QN AUTO: 33.6 PG (ref 26–34)
MCHC RBC AUTO-ENTMCNC: 32.4 G/DL (ref 31–37)
MCV RBC AUTO: 103.5 FL (ref 80–100)
MONOCYTES NFR BLD: 0.58 K/UL (ref 0–1)
MONOCYTES NFR BLD: 12 % (ref 4–8)
MORPHOLOGY: ABNORMAL
NEUTROPHILS NFR BLD: 66 % (ref 47–75)
NEUTS SEG NFR BLD: 3.16 K/UL (ref 2.5–7)
PLATELET # BLD AUTO: 291 K/UL (ref 140–450)
PMV BLD AUTO: 9.6 FL (ref 6–12)
RBC # BLD AUTO: 2.83 M/UL (ref 4–5.2)
WBC OTHER # BLD: 4.8 K/UL (ref 3.5–11)

## 2025-05-27 PROCEDURE — 36415 COLL VENOUS BLD VENIPUNCTURE: CPT

## 2025-05-27 PROCEDURE — 85025 COMPLETE CBC W/AUTO DIFF WBC: CPT

## 2025-05-27 PROCEDURE — 99214 OFFICE O/P EST MOD 30 MIN: CPT | Performed by: INTERNAL MEDICINE

## 2025-05-27 RX ORDER — POLYETHYLENE GLYCOL 3350, SODIUM SULFATE ANHYDROUS, SODIUM BICARBONATE, SODIUM CHLORIDE, POTASSIUM CHLORIDE 236; 22.74; 6.74; 5.86; 2.97 G/4L; G/4L; G/4L; G/4L; G/4L
4000 POWDER, FOR SOLUTION ORAL ONCE
Qty: 4000 ML | Refills: 0 | Status: SHIPPED | OUTPATIENT
Start: 2025-05-27 | End: 2025-05-30 | Stop reason: HOSPADM

## 2025-05-27 NOTE — PROGRESS NOTES
"Radiation Oncology Interval History & Physical:   I have reviewed the full History and Physical dated on 4/7/2025 and update from 5/20/2025. History and physical, and relevant findings reviewed. No interval changes except as below:    In brief, Madalyn Manzano is a 39 y.o. female with stage CORNELIA cervical SCC, cT4 cN1a cM0, with vesicovaginal fistula, bstructive uropathy now s/p bilateral nephrostomy. She also has a PET avid right external iliac lymph node. She started on carbotaxol on 1/15/2025, She was then started on concurrent chemoradiation with weekly Cisplatin (last on 4/22/2025), and has completed EBRT to the pelvis on 4/28, 45Gy in 25fx to the pelvis under Dr. Jeanne Vaca's care. Post-treatment MRI on 4/28/2025 showed Interval decreased size of a mass within the anterior right lateral cervix with decreased right parametrial extension without pelvic lymphadenopathy. Brachytherapy on 5/12/2025 which canceled due to pancytopenia noted on PAT on 5/5/2025 (Hb <6, Plt 6). She now presents to undergo brachytherapy after recovery of her counts.    Today she reports feeling good. Denies chest pain, SOB, Fever, vaginal discharge.   No new GI/ symptoms.     Consumed less than half a cup of coffee and took ibuprofen this morning around 4:30 am. Followed the bowel preparation instructions starting yesterday and took it this morning around 6:30 am which was reported to the anesthesiology team.   No other new symptoms.    Recent labs reviewed.   Lab Results   Component Value Date    WBC 4.0 (L) 05/20/2025    HGB 9.8 (L) 05/20/2025    HCT 29.5 (L) 05/20/2025     05/20/2025     (L) 05/20/2025      No results found for: \"NEUTROABS\"  Lab Results   Component Value Date    GLUCOSE 109 (H) 05/20/2025    CALCIUM 9.5 05/20/2025     05/20/2025    K 4.5 05/20/2025    CO2 27 05/20/2025     05/20/2025    BUN 4 (L) 05/20/2025    CREATININE 0.61 05/20/2025         Home Medications Reviewed: no changes noted "   Allergies Reviewed: no changes noted     PAT: Yes 5/5/2025    ERAS (Enhanced Recovery After Surgery):  ·  ERAS Patient: yes    Consent: yes, 5/20/2025     Plan:  - To OR for brachytherapy device placement.  She will have MAC applicator interstitial brachytherapy implant placed on 5/28/2025, and will receive treatments (total 25-30 Gy in 5 fx) on 5/28/2025 PM, 5/29/2025 AM, 5/29/2025 PM, 5/30/2025 AM, 5/30/2025PM.  - Admit for observation and treatment under Gyn Oncology after the procedure.    Discussed with MD Neyda Randolph MD  PGY-5, Radiation Oncology  5/28/2025 7:31 AM   For    Josue Perez MD, MMM  Senior Attending Physician, [facility]  Professor, [employer]   Our [place]: “To Heal, To Teach, To Discover.”  Phone (after hours): [phone]  RN partner: Abi Hodge  Radiation Oncology (scheduling): Nikki Bradshaw  Proton Therapy (scheduling): Lilian Vázquez  Brachytherapy (scheduling): Josey Dye    ATTENDING ADDENDUM:  I saw and evaluated the patient with the resident. I personally obtained the key and critical portions of the history and physical exam and directly counseled the patient of the treatment plan. I reviewed the resident documentation and discussed the patient with the resident. I agree with the resident's medical decision making as documented in the note.    Hand off provided to Anesthesia about patient drinking coffee and laxative in morning.

## 2025-05-27 NOTE — TELEPHONE ENCOUNTER
Called patient and let her know that her labs looked good and she is all set for brachytherapy tomorrow.  Reminded patient to report to admitting at 6:30 tomorrow.  Pt. Verbalized understanding.

## 2025-05-27 NOTE — PROGRESS NOTES
Madalyn Manzano  [id number]  1985 05/28/25    Name of Procedure: High-dose rate tempalte-based intracavitary/ Interstitial Brachytherapy (Uterus/Cervix/perineal)  Performing Physician: Josue Perez MD  Assisting Physician: Neyda Young MD     Anesthesia: MAC Anesthesia + Epidural    Indications: Madalyn Manzano is an 39 y.o. female who is having surgery for Stage CORNELIA cervical SCC, cT4 cN1a cM0, with vesicovaginal fistula.    The patient was seen in the preoperative area. The risks, benefits, complications, treatment options, non-operative alternatives, expected recovery and outcomes were discussed with the patient. The possibilities of reaction to medication, pulmonary aspiration, injury to surrounding structures, bleeding, recurrent infection, the need for additional procedures, failure to diagnose a condition, and creating a complication requiring transfusion or operation were discussed with the patient. The patient concurred with the proposed plan, giving informed consent.  The patient has been actively warmed in preoperative area.     Findings: Age appropriate external genitalia. No bleeding or discharge at introitus. Diffuse radiation dermatitis through the perineum including vulva and bilateral medial inguinal creases.   On digital examination prior to painting/draping the patient, the vagina was noted to be tapering superiorly into the cervix, which was otherwise flush with fornices and a central palpable os. The anterior fornix was thick as part of residual cervix/tumor with two small openings noted in the mid-anterior vaginal wall, likely site of fistula. There was a fibrotic scar noted to extend inferiorly to the distal anterior vaginal just stopping short of the introitus. Posterior vaginal wall was free. Uterus anteverted.  ADINA was performed and revealed that bilateral lateral parametria was free. ADINA triggered residual BM from bowel prep.    Resistance was met with 16Fr Solano, and hence, it  was changed to 14 Fr, which entered smoothly. There was no significant urine drainage, so distention of harp balloon was confirmed on ultrasound.    Speculum examination was completed after painting/draping and attempted back filling of bladder, which identified conical vaginal fornices leading to the residual, truncated cervix with necrotic appearing central region. Two openings were noted in mid-anterior vaginal wall, both less than 5 mm, with urine leakage seen from one of the openings.  Bladder filling couldn't be achieved due to significant resistance and continued leakage. Fibrotic band was noted extending from right half in mid-vagina to distal anterior vaginal closer to introitus.    Procedure Details:   The patient was taken to the operating room by the anesthesia team who placed Epidural catheter. Once ready, the patient was placed on the table in the supine position and sedation was initiated. Preoperative antibiotics are indicated. Venous thrombosis prophylaxis was initiated with bilateral sequential compression devices.    The radiation oncology team then performed a separate timeout. The patient was placed into stirrups in dorsal lithotomy position. Digital vaginal exanimation was  preformed which demonstrated the above findings.      The patient was then prepped and draped in the usual sterile fashion.  A Harp catheter was placed during the procedure. After assessment  of the anatomy a sterile speculum was placed and the anatomy was identified. Uterus sounded to 6 cm.    Brachytherapy was planned using the MAC device. Ultrasound guidance was used to identify the target volume and for needle placement. We used Mac Applicator with 17 needles and 6 cm tandem with a cervical stopper. Fixed Mac Applicator to the perineal skin with 4 sutures.      The device placement was then confirmed with CT scan while under anesthesia. Needle placements were adjusted as appropriate.      At the end of procedure,  anesthesia was reversed. Examination of the Solano bag showed no hematuria. Postoperative  counts were completed.     She tolerated the procedure well. Blood loss was less than 5 cc. The patient will be sent for post-operative planning MRI imaging and subsequent treatment in the department of radiation oncology.    Complications:  None; patient tolerated the procedure well.      Disposition: Radiation oncology department accompanied by RN from recovery. The patient will be sent for post-operative planning MR imaging and treatment in the department of radiation oncology after MRI.     Condition: stable     Additional Details:  -Following the delivery of today's fraction, the patient will be admitted by the Gyn-Onc team overnight for observation.  -Pain: epidural control  -DVT prophylaxis: heparin TID (Please hold after 8am on Friday 5/30), SCDs    Dr. Perez was present and scrubbed for the duration of procedure.     Neyda Young MD  PGY-5, Radiation Oncology  5/28/2025 4:20 PM   For  Josue Perez MD, MMM  Senior Attending Physician, [facility]  Professor, [employer]   Our [place]: “To Heal, To Teach, To Discover.”  Phone (after hours): [phone]  RN partner: Abi Hodge  Radiation Oncology (scheduling): Nikki Bradshaw  Proton Therapy (scheduling): Lilian Vázquez  Brachytherapy (scheduling): Josey Dye    ATTENDING ADDENDUM:  I saw and evaluated the patient with the resident. I was scrubbed in for the entire procedure.   I personally obtained the key and critical portions of the history and physical exam and directly counseled the patient of the treatment plan. I reviewed the resident documentation and discussed the patient with the resident. I agree with the resident's medical decision making as documented in the note.

## 2025-05-27 NOTE — TELEPHONE ENCOUNTER
Called and spoke with Ms. Jose Juan.  Informed her that a prescription for Golytely was sent to her pharmacy and that she needs to get it this morning and start taking it.  Also discussed her getting updated labs prior to starting the Golytely.  Reminded patient that she needs to be here at 6:30 tomorrow morning, and confirmed that she got her instructions through [facility].  Patient verbalized understanding of all of the above.

## 2025-05-27 NOTE — PROGRESS NOTES
oncology and Dr. Joshua and the plan to proceed with induction chemotherapy and subsequently concurrent chemo RT also patient will seek a second opinion    Interval history  Feeling better  Status post nephrostomy tube exchange  No hematuria  Hemoglobin up and so platelet  Flank pain improved  During this visit patient's allergy, social, medical, surgical history and medications were reviewed and updated.      Past Medical History:   Diagnosis Date    Anxiety     Cancer (HCC)     Cervical    Drug addict (HCC)     Hepatitis C     Liver disease     Nephrostomy present (HCC)     bilateral       Past Surgical History:   Procedure Laterality Date    TUBAL LIGATION  2015       Allergies   Allergen Reactions    Septra [Sulfamethoxazole-Trimethoprim] Hives       Current Outpatient Medications   Medication Sig Dispense Refill    levoFLOXacin (LEVAQUIN) 750 MG tablet Take 1 tablet by mouth daily      OLANZapine (ZYPREXA) 2.5 MG tablet Take 1 tablet by mouth nightly      prazosin (MINIPRESS) 1 MG capsule Take 1 capsule by mouth nightly      DULoxetine (CYMBALTA) 30 MG extended release capsule Take 1 capsule by mouth daily      gabapentin (NEURONTIN) 100 MG capsule Take 1 capsule by mouth 3 times daily.      Buprenorphine HCl-Naloxone HCl (SUBOXONE SL) Place 1 Film under the tongue in the morning and at bedtime      ondansetron (ZOFRAN-ODT) 8 MG TBDP disintegrating tablet Take 1 tablet by mouth every 8 hours as needed for Nausea or Vomiting 60 tablet 1    lidocaine-prilocaine (EMLA) 2.5-2.5 % cream Apply topically to port site 60 minutes before access as needed. 30 g 1    HYDROcodone-acetaminophen (NORCO) 5-325 MG per tablet TAKE 1 TABLET BY MOUTH EVERY 4 HOURS AS NEEDED FOR PAIN FOR 14 DAYS      tiZANidine (ZANAFLEX) 4 MG tablet TAKE 1 TABLET BY MOUTH NIGHTLY AS NEEDED for muscle spasm 30 tablet 2    Multiple Vitamin (MULTI VITAMIN PO) Take by mouth daily       No current facility-administered medications for this visit.

## 2025-05-28 ENCOUNTER — ANESTHESIA (OUTPATIENT)
Dept: RADIATION ONCOLOGY | Facility: HOSPITAL | Age: 40
End: 2025-05-28
Payer: COMMERCIAL

## 2025-05-28 ENCOUNTER — HOSPITAL ENCOUNTER (OUTPATIENT)
Dept: RADIOLOGY | Facility: HOSPITAL | Age: 40
Discharge: HOME | End: 2025-05-28
Payer: COMMERCIAL

## 2025-05-28 ENCOUNTER — HOSPITAL ENCOUNTER (INPATIENT)
Dept: RADIATION ONCOLOGY | Facility: HOSPITAL | Age: 40
LOS: 1 days | Discharge: HOME | End: 2025-05-30
Attending: STUDENT IN AN ORGANIZED HEALTH CARE EDUCATION/TRAINING PROGRAM | Admitting: STUDENT IN AN ORGANIZED HEALTH CARE EDUCATION/TRAINING PROGRAM
Payer: COMMERCIAL

## 2025-05-28 ENCOUNTER — HOSPITAL ENCOUNTER (OUTPATIENT)
Dept: RADIATION ONCOLOGY | Facility: HOSPITAL | Age: 40
Setting detail: RADIATION/ONCOLOGY SERIES
Discharge: HOME | End: 2025-05-28
Payer: COMMERCIAL

## 2025-05-28 ENCOUNTER — HOSPITAL ENCOUNTER (OUTPATIENT)
Dept: RADIOLOGY | Facility: EXTERNAL LOCATION | Age: 40
Discharge: HOME | End: 2025-05-28

## 2025-05-28 ENCOUNTER — ANESTHESIA EVENT (OUTPATIENT)
Dept: RADIATION ONCOLOGY | Facility: HOSPITAL | Age: 40
End: 2025-05-28
Payer: COMMERCIAL

## 2025-05-28 VITALS
DIASTOLIC BLOOD PRESSURE: 55 MMHG | OXYGEN SATURATION: 98 % | HEART RATE: 61 BPM | RESPIRATION RATE: 16 BRPM | TEMPERATURE: 97.5 F | SYSTOLIC BLOOD PRESSURE: 105 MMHG

## 2025-05-28 DIAGNOSIS — C53.9 CERVICAL CANCER, FIGO STAGE IVA: Primary | ICD-10-CM

## 2025-05-28 DIAGNOSIS — C53.9 CERVICAL CANCER, FIGO STAGE IVA: ICD-10-CM

## 2025-05-28 DIAGNOSIS — N13.9 OBSTRUCTIVE UROPATHY: ICD-10-CM

## 2025-05-28 DIAGNOSIS — C53.9 MALIGNANT NEOPLASM OF CERVIX, UNSPECIFIED SITE (MULTI): ICD-10-CM

## 2025-05-28 DIAGNOSIS — C53.0 MALIGNANT NEOPLASM OF ENDOCERVIX (MULTI): ICD-10-CM

## 2025-05-28 LAB
RAD ONC MSQ ACTUAL FRACTIONS DELIVERED: 1
RAD ONC MSQ ACTUAL SESSION DELIVERED DOSE: 600 CGRAY
RAD ONC MSQ ACTUAL TOTAL DOSE: 600 CGRAY
RAD ONC MSQ ELAPSED DAYS: 0
RAD ONC MSQ LAST DATE: NORMAL
RAD ONC MSQ PRESCRIBED FRACTIONAL DOSE: 600 CGRAY
RAD ONC MSQ PRESCRIBED NUMBER OF FRACTIONS: 5
RAD ONC MSQ PRESCRIBED TECHNIQUE: NORMAL
RAD ONC MSQ PRESCRIBED TOTAL DOSE: 3000 CGRAY
RAD ONC MSQ PRESCRIPTION PATTERN COMMENT: NORMAL
RAD ONC MSQ START DATE: NORMAL
RAD ONC MSQ TREATMENT COURSE NUMBER: 1
RAD ONC MSQ TREATMENT SITE: NORMAL

## 2025-05-28 PROCEDURE — 2500000002 HC RX 250 W HCPCS SELF ADMINISTERED DRUGS (ALT 637 FOR MEDICARE OP, ALT 636 FOR OP/ED)

## 2025-05-28 PROCEDURE — 77332 RADIATION TREATMENT AID(S): CPT | Performed by: RADIOLOGY

## 2025-05-28 PROCEDURE — C1715 BRACHYTHERAPY NEEDLE: HCPCS | Performed by: RADIOLOGY

## 2025-05-28 PROCEDURE — 3700000002 HC GENERAL ANESTHESIA TIME - EACH INCREMENTAL 1 MINUTE: Performed by: ANESTHESIOLOGY

## 2025-05-28 PROCEDURE — 3700000001 HC GENERAL ANESTHESIA TIME - INITIAL BASE CHARGE: Performed by: ANESTHESIOLOGY

## 2025-05-28 PROCEDURE — A57155 PR INSERT,UTERINE TANDEMS  AND /OR VAG OVOIDS: Performed by: ANESTHESIOLOGY

## 2025-05-28 PROCEDURE — 77772 HDR RDNCL NTRSTL/ICAV BRCHTX: CPT | Performed by: RADIOLOGY

## 2025-05-28 PROCEDURE — 2500000002 HC RX 250 W HCPCS SELF ADMINISTERED DRUGS (ALT 637 FOR MEDICARE OP, ALT 636 FOR OP/ED): Performed by: STUDENT IN AN ORGANIZED HEALTH CARE EDUCATION/TRAINING PROGRAM

## 2025-05-28 PROCEDURE — 3700000014 EPIDURAL BLOCK: Performed by: ANESTHESIOLOGY

## 2025-05-28 PROCEDURE — 76965 ECHO GUIDANCE RADIOTHERAPY: CPT | Performed by: RADIOLOGY

## 2025-05-28 PROCEDURE — 0UHD7YZ INSERTION OF OTHER DEVICE INTO UTERUS AND CERVIX, VIA NATURAL OR ARTIFICIAL OPENING: ICD-10-PCS | Performed by: RADIOLOGY

## 2025-05-28 PROCEDURE — 77295 3-D RADIOTHERAPY PLAN: CPT | Performed by: RADIOLOGY

## 2025-05-28 PROCEDURE — 2500000004 HC RX 250 GENERAL PHARMACY W/ HCPCS (ALT 636 FOR OP/ED): Performed by: STUDENT IN AN ORGANIZED HEALTH CARE EDUCATION/TRAINING PROGRAM

## 2025-05-28 PROCEDURE — 96372 THER/PROPH/DIAG INJ SC/IM: CPT | Performed by: STUDENT IN AN ORGANIZED HEALTH CARE EDUCATION/TRAINING PROGRAM

## 2025-05-28 PROCEDURE — 99223 1ST HOSP IP/OBS HIGH 75: CPT

## 2025-05-28 PROCEDURE — 77012 CT SCAN FOR NEEDLE BIOPSY: CPT | Performed by: RADIOLOGY

## 2025-05-28 PROCEDURE — A57155 PR INSERT,UTERINE TANDEMS  AND /OR VAG OVOIDS: Performed by: ANESTHESIOLOGIST ASSISTANT

## 2025-05-28 PROCEDURE — 2500000004 HC RX 250 GENERAL PHARMACY W/ HCPCS (ALT 636 FOR OP/ED): Mod: JW,TB

## 2025-05-28 PROCEDURE — 2500000001 HC RX 250 WO HCPCS SELF ADMINISTERED DRUGS (ALT 637 FOR MEDICARE OP)

## 2025-05-28 PROCEDURE — 2500000001 HC RX 250 WO HCPCS SELF ADMINISTERED DRUGS (ALT 637 FOR MEDICARE OP): Performed by: STUDENT IN AN ORGANIZED HEALTH CARE EDUCATION/TRAINING PROGRAM

## 2025-05-28 PROCEDURE — 77290 THER RAD SIMULAJ FIELD CPLX: CPT | Performed by: RADIOLOGY

## 2025-05-28 PROCEDURE — 2500000004 HC RX 250 GENERAL PHARMACY W/ HCPCS (ALT 636 FOR OP/ED): Mod: JZ | Performed by: ANESTHESIOLOGIST ASSISTANT

## 2025-05-28 PROCEDURE — 2500000005 HC RX 250 GENERAL PHARMACY W/O HCPCS: Performed by: ANESTHESIOLOGIST ASSISTANT

## 2025-05-28 PROCEDURE — 57156 INS VAG BRACHYTX DEVICE: CPT | Performed by: RADIOLOGY

## 2025-05-28 PROCEDURE — C1717 BRACHYTX, NON-STR,HDR IR-192: HCPCS | Performed by: RADIOLOGY

## 2025-05-28 PROCEDURE — G0378 HOSPITAL OBSERVATION PER HR: HCPCS

## 2025-05-28 PROCEDURE — 77370 RADIATION PHYSICS CONSULT: CPT | Mod: 59 | Performed by: RADIOLOGY

## 2025-05-28 PROCEDURE — DU1198Z HIGH DOSE RATE (HDR) BRACHYTHERAPY OF CERVIX USING IRIDIUM 192 (IR-192): ICD-10-PCS | Performed by: RADIOLOGY

## 2025-05-28 RX ORDER — CEFAZOLIN 1 G/1
INJECTION, POWDER, FOR SOLUTION INTRAVENOUS
Status: DISPENSED
Start: 2025-05-28 | End: 2025-05-28

## 2025-05-28 RX ORDER — NORETHINDRONE AND ETHINYL ESTRADIOL 0.5-0.035
KIT ORAL AS NEEDED
Status: DISCONTINUED | OUTPATIENT
Start: 2025-05-28 | End: 2025-05-28

## 2025-05-28 RX ORDER — ONDANSETRON HYDROCHLORIDE 2 MG/ML
INJECTION, SOLUTION INTRAVENOUS AS NEEDED
Status: DISCONTINUED | OUTPATIENT
Start: 2025-05-28 | End: 2025-05-28

## 2025-05-28 RX ORDER — SODIUM CHLORIDE, SODIUM LACTATE, POTASSIUM CHLORIDE, CALCIUM CHLORIDE 600; 310; 30; 20 MG/100ML; MG/100ML; MG/100ML; MG/100ML
INJECTION, SOLUTION INTRAVENOUS CONTINUOUS PRN
Status: DISCONTINUED | OUTPATIENT
Start: 2025-05-28 | End: 2025-05-28

## 2025-05-28 RX ORDER — KETOROLAC TROMETHAMINE 30 MG/ML
INJECTION, SOLUTION INTRAMUSCULAR; INTRAVENOUS AS NEEDED
Status: DISCONTINUED | OUTPATIENT
Start: 2025-05-28 | End: 2025-05-28

## 2025-05-28 RX ORDER — FENTANYL CITRATE 50 UG/ML
INJECTION, SOLUTION INTRAMUSCULAR; INTRAVENOUS AS NEEDED
Status: DISCONTINUED | OUTPATIENT
Start: 2025-05-28 | End: 2025-05-28

## 2025-05-28 RX ORDER — METHOCARBAMOL 100 MG/ML
INJECTION, SOLUTION INTRAMUSCULAR; INTRAVENOUS
Status: DISPENSED
Start: 2025-05-28 | End: 2025-05-28

## 2025-05-28 RX ORDER — PROPOFOL 10 MG/ML
INJECTION, EMULSION INTRAVENOUS CONTINUOUS PRN
Status: DISCONTINUED | OUTPATIENT
Start: 2025-05-28 | End: 2025-05-28

## 2025-05-28 RX ORDER — ROPIVACAINE HYDROCHLORIDE 2 MG/ML
INJECTION, SOLUTION EPIDURAL; INFILTRATION; PERINEURAL CONTINUOUS PRN
Status: DISCONTINUED | OUTPATIENT
Start: 2025-05-28 | End: 2025-05-28

## 2025-05-28 RX ORDER — KETOROLAC TROMETHAMINE 30 MG/ML
INJECTION, SOLUTION INTRAMUSCULAR; INTRAVENOUS
Status: COMPLETED
Start: 2025-05-28 | End: 2025-05-28

## 2025-05-28 RX ORDER — SIMETHICONE 80 MG
80 TABLET,CHEWABLE ORAL 4 TIMES DAILY PRN
Status: DISCONTINUED | OUTPATIENT
Start: 2025-05-28 | End: 2025-05-30 | Stop reason: HOSPADM

## 2025-05-28 RX ORDER — MIDAZOLAM HYDROCHLORIDE 1 MG/ML
INJECTION, SOLUTION INTRAMUSCULAR; INTRAVENOUS AS NEEDED
Status: DISCONTINUED | OUTPATIENT
Start: 2025-05-28 | End: 2025-05-28

## 2025-05-28 RX ORDER — FENTANYL CITRATE 50 UG/ML
INJECTION, SOLUTION INTRAMUSCULAR; INTRAVENOUS
Status: COMPLETED
Start: 2025-05-28 | End: 2025-05-28

## 2025-05-28 RX ORDER — DIPHENOXYLATE HYDROCHLORIDE AND ATROPINE SULFATE 2.5; .025 MG/1; MG/1
2 TABLET ORAL 4 TIMES DAILY
Status: DISCONTINUED | OUTPATIENT
Start: 2025-05-28 | End: 2025-05-30 | Stop reason: HOSPADM

## 2025-05-28 RX ORDER — LIDOCAINE HYDROCHLORIDE 20 MG/ML
INJECTION, SOLUTION EPIDURAL; INFILTRATION; INTRACAUDAL; PERINEURAL
Status: DISPENSED
Start: 2025-05-28 | End: 2025-05-28

## 2025-05-28 RX ORDER — LOPERAMIDE HYDROCHLORIDE 2 MG/1
2 CAPSULE ORAL EVERY 8 HOURS
Status: DISCONTINUED | OUTPATIENT
Start: 2025-05-28 | End: 2025-05-30 | Stop reason: HOSPADM

## 2025-05-28 RX ORDER — METOCLOPRAMIDE HYDROCHLORIDE 5 MG/ML
INJECTION INTRAMUSCULAR; INTRAVENOUS
Status: DISPENSED
Start: 2025-05-28 | End: 2025-05-28

## 2025-05-28 RX ORDER — SODIUM CHLORIDE, SODIUM LACTATE, POTASSIUM CHLORIDE, CALCIUM CHLORIDE 600; 310; 30; 20 MG/100ML; MG/100ML; MG/100ML; MG/100ML
INJECTION, SOLUTION INTRAVENOUS
Status: COMPLETED
Start: 2025-05-28 | End: 2025-05-28

## 2025-05-28 RX ORDER — PRAZOSIN HYDROCHLORIDE 1 MG/1
1 CAPSULE ORAL NIGHTLY
Status: DISCONTINUED | OUTPATIENT
Start: 2025-05-28 | End: 2025-05-30 | Stop reason: HOSPADM

## 2025-05-28 RX ORDER — METOCLOPRAMIDE HYDROCHLORIDE 5 MG/ML
INJECTION INTRAMUSCULAR; INTRAVENOUS AS NEEDED
Status: DISCONTINUED | OUTPATIENT
Start: 2025-05-28 | End: 2025-05-28

## 2025-05-28 RX ORDER — NORETHINDRONE AND ETHINYL ESTRADIOL 0.5-0.035
KIT ORAL
Status: COMPLETED
Start: 2025-05-28 | End: 2025-05-28

## 2025-05-28 RX ORDER — BUPRENORPHINE AND NALOXONE 2; .5 MG/1; MG/1
1 FILM, SOLUBLE BUCCAL; SUBLINGUAL NIGHTLY
Status: DISCONTINUED | OUTPATIENT
Start: 2025-05-28 | End: 2025-05-30 | Stop reason: HOSPADM

## 2025-05-28 RX ORDER — GLYCOPYRROLATE 0.2 MG/ML
INJECTION INTRAMUSCULAR; INTRAVENOUS AS NEEDED
Status: DISCONTINUED | OUTPATIENT
Start: 2025-05-28 | End: 2025-05-28

## 2025-05-28 RX ORDER — DULOXETIN HYDROCHLORIDE 20 MG/1
20 CAPSULE, DELAYED RELEASE ORAL DAILY
Status: DISCONTINUED | OUTPATIENT
Start: 2025-05-28 | End: 2025-05-29

## 2025-05-28 RX ORDER — METHOCARBAMOL 100 MG/ML
INJECTION, SOLUTION INTRAMUSCULAR; INTRAVENOUS AS NEEDED
Status: DISCONTINUED | OUTPATIENT
Start: 2025-05-28 | End: 2025-05-28

## 2025-05-28 RX ORDER — FAMOTIDINE 10 MG/ML
INJECTION INTRAVENOUS AS NEEDED
Status: DISCONTINUED | OUTPATIENT
Start: 2025-05-28 | End: 2025-05-28

## 2025-05-28 RX ORDER — SODIUM BICARBONATE 1 MEQ/ML
SYRINGE (ML) INTRAVENOUS
Status: DISPENSED
Start: 2025-05-28 | End: 2025-05-29

## 2025-05-28 RX ORDER — MIDAZOLAM HYDROCHLORIDE 1 MG/ML
INJECTION INTRAMUSCULAR; INTRAVENOUS
Status: DISPENSED
Start: 2025-05-28 | End: 2025-05-28

## 2025-05-28 RX ORDER — CEFAZOLIN 1 G/1
INJECTION, POWDER, FOR SOLUTION INTRAVENOUS AS NEEDED
Status: DISCONTINUED | OUTPATIENT
Start: 2025-05-28 | End: 2025-05-28

## 2025-05-28 RX ORDER — ONDANSETRON HYDROCHLORIDE 2 MG/ML
INJECTION, SOLUTION INTRAVENOUS
Status: COMPLETED
Start: 2025-05-28 | End: 2025-05-28

## 2025-05-28 RX ORDER — LIDOCAINE HYDROCHLORIDE AND EPINEPHRINE 15; 5 MG/ML; UG/ML
INJECTION, SOLUTION EPIDURAL AS NEEDED
Status: DISCONTINUED | OUTPATIENT
Start: 2025-05-28 | End: 2025-05-28

## 2025-05-28 RX ORDER — HEPARIN SODIUM 5000 [USP'U]/ML
5000 INJECTION, SOLUTION INTRAVENOUS; SUBCUTANEOUS EVERY 8 HOURS
Status: DISCONTINUED | OUTPATIENT
Start: 2025-05-28 | End: 2025-05-30 | Stop reason: HOSPADM

## 2025-05-28 RX ORDER — LORAZEPAM 1 MG/1
1 TABLET ORAL EVERY 6 HOURS PRN
Status: DISCONTINUED | OUTPATIENT
Start: 2025-05-28 | End: 2025-05-30 | Stop reason: HOSPADM

## 2025-05-28 RX ORDER — EPINEPHRINE 1 MG/ML
INJECTION, SOLUTION, CONCENTRATE INTRAVENOUS
Status: DISPENSED
Start: 2025-05-28 | End: 2025-05-28

## 2025-05-28 RX ORDER — PHENYLEPHRINE HYDROCHLORIDE 10 MG/ML
INJECTION INTRAVENOUS AS NEEDED
Status: DISCONTINUED | OUTPATIENT
Start: 2025-05-28 | End: 2025-05-28

## 2025-05-28 RX ORDER — PROPOFOL 10 MG/ML
INJECTION, EMULSION INTRAVENOUS
Status: COMPLETED
Start: 2025-05-28 | End: 2025-05-28

## 2025-05-28 RX ADMIN — LIDOCAINE HYDROCHLORIDE AND EPINEPHRINE 3 ML: 15; 5 INJECTION, SOLUTION EPIDURAL at 10:14

## 2025-05-28 RX ADMIN — LIDOCAINE HYDROCHLORIDE AND EPINEPHRINE 2 ML: 15; 5 INJECTION, SOLUTION EPIDURAL at 09:20

## 2025-05-28 RX ADMIN — DULOXETINE 20 MG: 20 CAPSULE, DELAYED RELEASE ORAL at 20:22

## 2025-05-28 RX ADMIN — PHENYLEPHRINE HYDROCHLORIDE 120 MCG: 10 INJECTION INTRAVENOUS at 09:14

## 2025-05-28 RX ADMIN — PHENYLEPHRINE HYDROCHLORIDE 120 MCG: 10 INJECTION INTRAVENOUS at 10:12

## 2025-05-28 RX ADMIN — PHENYLEPHRINE HYDROCHLORIDE 80 MCG: 10 INJECTION INTRAVENOUS at 09:24

## 2025-05-28 RX ADMIN — ROPIVACAINE HYDROCHLORIDE 10 ML/HR: 2 INJECTION, SOLUTION EPIDURAL; INFILTRATION; PERINEURAL at 12:15

## 2025-05-28 RX ADMIN — FAMOTIDINE 20 MG: 10 INJECTION, SOLUTION INTRAVENOUS at 09:20

## 2025-05-28 RX ADMIN — FENTANYL CITRATE 100 MCG: 50 INJECTION, SOLUTION INTRAMUSCULAR; INTRAVENOUS at 09:15

## 2025-05-28 RX ADMIN — KETOROLAC TROMETHAMINE 30 MG: 30 INJECTION, SOLUTION INTRAMUSCULAR at 12:16

## 2025-05-28 RX ADMIN — PROPOFOL 50 MCG/KG/MIN: 10 INJECTION, EMULSION INTRAVENOUS at 09:05

## 2025-05-28 RX ADMIN — PHENYLEPHRINE HYDROCHLORIDE 80 MCG: 10 INJECTION INTRAVENOUS at 09:45

## 2025-05-28 RX ADMIN — GLYCOPYRROLATE 0.2 MG: 0.2 INJECTION INTRAMUSCULAR; INTRAVENOUS at 09:26

## 2025-05-28 RX ADMIN — EPHEDRINE SULFATE 10 MG: 50 INJECTION INTRAVENOUS at 10:26

## 2025-05-28 RX ADMIN — METHOCARBAMOL 1000 MG: 100 INJECTION INTRAMUSCULAR; INTRAVENOUS at 11:30

## 2025-05-28 RX ADMIN — BUPRENORPHINE AND NALOXONE 1 FILM: 2; .5 FILM BUCCAL; SUBLINGUAL at 22:27

## 2025-05-28 RX ADMIN — CEFAZOLIN 2 G: 330 INJECTION, POWDER, FOR SOLUTION INTRAMUSCULAR; INTRAVENOUS at 08:43

## 2025-05-28 RX ADMIN — DIPHENOXYLATE HYDROCHLORIDE AND ATROPINE SULFATE 2 TABLET: .025; 2.5 TABLET ORAL at 17:58

## 2025-05-28 RX ADMIN — LIDOCAINE HYDROCHLORIDE AND EPINEPHRINE 10 ML: 15; 5 INJECTION, SOLUTION EPIDURAL at 09:01

## 2025-05-28 RX ADMIN — PRAZOSIN HYDROCHLORIDE 1 MG: 1 CAPSULE ORAL at 20:22

## 2025-05-28 RX ADMIN — PHENYLEPHRINE HYDROCHLORIDE 120 MCG: 10 INJECTION INTRAVENOUS at 09:02

## 2025-05-28 RX ADMIN — HEPARIN SODIUM 5000 UNITS: 5000 INJECTION, SOLUTION INTRAVENOUS; SUBCUTANEOUS at 20:22

## 2025-05-28 RX ADMIN — LIDOCAINE HYDROCHLORIDE AND EPINEPHRINE 3 ML: 15; 5 INJECTION, SOLUTION EPIDURAL at 11:28

## 2025-05-28 RX ADMIN — HYDROMORPHONE HYDROCHLORIDE 0.2 MG: 1 INJECTION, SOLUTION INTRAMUSCULAR; INTRAVENOUS; SUBCUTANEOUS at 21:11

## 2025-05-28 RX ADMIN — PHENYLEPHRINE HYDROCHLORIDE 120 MCG: 10 INJECTION INTRAVENOUS at 09:20

## 2025-05-28 RX ADMIN — EPHEDRINE SULFATE 10 MG: 50 INJECTION INTRAVENOUS at 10:32

## 2025-05-28 RX ADMIN — SODIUM CHLORIDE, SODIUM LACTATE, POTASSIUM CHLORIDE, AND CALCIUM CHLORIDE: .6; .31; .03; .02 INJECTION, SOLUTION INTRAVENOUS at 08:28

## 2025-05-28 RX ADMIN — LIDOCAINE HYDROCHLORIDE AND EPINEPHRINE 2 ML: 15; 5 INJECTION, SOLUTION EPIDURAL at 10:10

## 2025-05-28 RX ADMIN — EPHEDRINE SULFATE 15 MG: 50 INJECTION INTRAVENOUS at 10:53

## 2025-05-28 RX ADMIN — ONDANSETRON 4 MG: 2 INJECTION, SOLUTION INTRAMUSCULAR; INTRAVENOUS at 12:16

## 2025-05-28 RX ADMIN — LIDOCAINE HYDROCHLORIDE AND EPINEPHRINE 2 ML: 15; 5 INJECTION, SOLUTION EPIDURAL at 09:15

## 2025-05-28 RX ADMIN — LIDOCAINE HYDROCHLORIDE AND EPINEPHRINE 3 ML: 15; 5 INJECTION, SOLUTION EPIDURAL at 09:12

## 2025-05-28 RX ADMIN — DIPHENOXYLATE HYDROCHLORIDE AND ATROPINE SULFATE 2 TABLET: .025; 2.5 TABLET ORAL at 21:11

## 2025-05-28 RX ADMIN — EPHEDRINE SULFATE 15 MG: 50 INJECTION INTRAVENOUS at 11:28

## 2025-05-28 RX ADMIN — PHENYLEPHRINE HYDROCHLORIDE 80 MCG: 10 INJECTION INTRAVENOUS at 10:02

## 2025-05-28 RX ADMIN — LOPERAMIDE HYDROCHLORIDE 2 MG: 2 CAPSULE ORAL at 17:58

## 2025-05-28 RX ADMIN — MIDAZOLAM 2 MG: 1 INJECTION INTRAMUSCULAR; INTRAVENOUS at 08:32

## 2025-05-28 RX ADMIN — LORAZEPAM 1 MG: 1 TABLET ORAL at 20:21

## 2025-05-28 RX ADMIN — METOCLOPRAMIDE 10 MG: 5 INJECTION, SOLUTION INTRAMUSCULAR; INTRAVENOUS at 10:25

## 2025-05-28 SDOH — ECONOMIC STABILITY: HOUSING INSECURITY: IN THE LAST 12 MONTHS, WAS THERE A TIME WHEN YOU WERE NOT ABLE TO PAY THE MORTGAGE OR RENT ON TIME?: NO

## 2025-05-28 SDOH — SOCIAL STABILITY: SOCIAL INSECURITY: WITHIN THE LAST YEAR, HAVE YOU BEEN HUMILIATED OR EMOTIONALLY ABUSED IN OTHER WAYS BY YOUR PARTNER OR EX-PARTNER?: NO

## 2025-05-28 SDOH — SOCIAL STABILITY: SOCIAL INSECURITY: ARE THERE ANY APPARENT SIGNS OF INJURIES/BEHAVIORS THAT COULD BE RELATED TO ABUSE/NEGLECT?: NO

## 2025-05-28 SDOH — ECONOMIC STABILITY: HOUSING INSECURITY: IN THE PAST 12 MONTHS, HOW MANY TIMES HAVE YOU MOVED WHERE YOU WERE LIVING?: 0

## 2025-05-28 SDOH — ECONOMIC STABILITY: FOOD INSECURITY: WITHIN THE PAST 12 MONTHS, THE FOOD YOU BOUGHT JUST DIDN'T LAST AND YOU DIDN'T HAVE MONEY TO GET MORE.: NEVER TRUE

## 2025-05-28 SDOH — ECONOMIC STABILITY: INCOME INSECURITY: IN THE PAST 12 MONTHS HAS THE ELECTRIC, GAS, OIL, OR WATER COMPANY THREATENED TO SHUT OFF SERVICES IN YOUR HOME?: NO

## 2025-05-28 SDOH — ECONOMIC STABILITY: FOOD INSECURITY: HOW HARD IS IT FOR YOU TO PAY FOR THE VERY BASICS LIKE FOOD, HOUSING, MEDICAL CARE, AND HEATING?: NOT HARD AT ALL

## 2025-05-28 SDOH — SOCIAL STABILITY: SOCIAL INSECURITY: HAVE YOU HAD THOUGHTS OF HARMING ANYONE ELSE?: NO

## 2025-05-28 SDOH — ECONOMIC STABILITY: FOOD INSECURITY: WITHIN THE PAST 12 MONTHS, YOU WORRIED THAT YOUR FOOD WOULD RUN OUT BEFORE YOU GOT THE MONEY TO BUY MORE.: NEVER TRUE

## 2025-05-28 SDOH — ECONOMIC STABILITY: TRANSPORTATION INSECURITY: IN THE PAST 12 MONTHS, HAS LACK OF TRANSPORTATION KEPT YOU FROM MEDICAL APPOINTMENTS OR FROM GETTING MEDICATIONS?: NO

## 2025-05-28 SDOH — SOCIAL STABILITY: SOCIAL INSECURITY: WITHIN THE LAST YEAR, HAVE YOU BEEN AFRAID OF YOUR PARTNER OR EX-PARTNER?: NO

## 2025-05-28 SDOH — ECONOMIC STABILITY: HOUSING INSECURITY: AT ANY TIME IN THE PAST 12 MONTHS, WERE YOU HOMELESS OR LIVING IN A SHELTER (INCLUDING NOW)?: NO

## 2025-05-28 SDOH — SOCIAL STABILITY: SOCIAL INSECURITY
WITHIN THE LAST YEAR, HAVE YOU BEEN KICKED, HIT, SLAPPED, OR OTHERWISE PHYSICALLY HURT BY YOUR PARTNER OR EX-PARTNER?: NO

## 2025-05-28 SDOH — SOCIAL STABILITY: SOCIAL INSECURITY: DO YOU FEEL ANYONE HAS EXPLOITED OR TAKEN ADVANTAGE OF YOU FINANCIALLY OR OF YOUR PERSONAL PROPERTY?: NO

## 2025-05-28 SDOH — SOCIAL STABILITY: SOCIAL INSECURITY
WITHIN THE LAST YEAR, HAVE YOU BEEN RAPED OR FORCED TO HAVE ANY KIND OF SEXUAL ACTIVITY BY YOUR PARTNER OR EX-PARTNER?: NO

## 2025-05-28 SDOH — SOCIAL STABILITY: SOCIAL INSECURITY: HAS ANYONE EVER THREATENED TO HURT YOUR FAMILY OR YOUR PETS?: NO

## 2025-05-28 SDOH — SOCIAL STABILITY: SOCIAL INSECURITY: DOES ANYONE TRY TO KEEP YOU FROM HAVING/CONTACTING OTHER FRIENDS OR DOING THINGS OUTSIDE YOUR HOME?: NO

## 2025-05-28 SDOH — SOCIAL STABILITY: SOCIAL INSECURITY: DO YOU FEEL UNSAFE GOING BACK TO THE PLACE WHERE YOU ARE LIVING?: NO

## 2025-05-28 SDOH — SOCIAL STABILITY: SOCIAL INSECURITY: WERE YOU ABLE TO COMPLETE ALL THE BEHAVIORAL HEALTH SCREENINGS?: YES

## 2025-05-28 SDOH — SOCIAL STABILITY: SOCIAL INSECURITY: ARE YOU OR HAVE YOU BEEN THREATENED OR ABUSED PHYSICALLY, EMOTIONALLY, OR SEXUALLY BY ANYONE?: NO

## 2025-05-28 SDOH — SOCIAL STABILITY: SOCIAL INSECURITY: HAVE YOU HAD ANY THOUGHTS OF HARMING ANYONE ELSE?: NO

## 2025-05-28 SDOH — SOCIAL STABILITY: SOCIAL INSECURITY: ABUSE: ADULT

## 2025-05-28 ASSESSMENT — COGNITIVE AND FUNCTIONAL STATUS - GENERAL
HELP NEEDED FOR BATHING: A LITTLE
WALKING IN HOSPITAL ROOM: A LITTLE
DRESSING REGULAR LOWER BODY CLOTHING: A LITTLE
MOVING FROM LYING ON BACK TO SITTING ON SIDE OF FLAT BED WITH BEDRAILS: A LITTLE
TURNING FROM BACK TO SIDE WHILE IN FLAT BAD: A LITTLE
DAILY ACTIVITIY SCORE: 18
MOBILITY SCORE: 18
DRESSING REGULAR LOWER BODY CLOTHING: A LITTLE
PERSONAL GROOMING: A LITTLE
PERSONAL GROOMING: A LITTLE
HELP NEEDED FOR BATHING: A LITTLE
EATING MEALS: A LITTLE
WALKING IN HOSPITAL ROOM: A LITTLE
TOILETING: A LITTLE
MOVING TO AND FROM BED TO CHAIR: A LITTLE
TURNING FROM BACK TO SIDE WHILE IN FLAT BAD: A LITTLE
TOILETING: A LITTLE
CLIMB 3 TO 5 STEPS WITH RAILING: A LITTLE
MOVING TO AND FROM BED TO CHAIR: A LITTLE
PATIENT BASELINE BEDBOUND: NO
WALKING IN HOSPITAL ROOM: A LITTLE
HELP NEEDED FOR BATHING: A LITTLE
CLIMB 3 TO 5 STEPS WITH RAILING: A LITTLE
DAILY ACTIVITIY SCORE: 18
STANDING UP FROM CHAIR USING ARMS: A LITTLE
TOILETING: A LITTLE
TURNING FROM BACK TO SIDE WHILE IN FLAT BAD: A LITTLE
DRESSING REGULAR UPPER BODY CLOTHING: A LITTLE
PERSONAL GROOMING: A LITTLE
MOBILITY SCORE: 17
STANDING UP FROM CHAIR USING ARMS: A LITTLE
MOBILITY SCORE: 18
STANDING UP FROM CHAIR USING ARMS: A LITTLE
DRESSING REGULAR LOWER BODY CLOTHING: A LITTLE
DRESSING REGULAR UPPER BODY CLOTHING: A LITTLE
MOVING TO AND FROM BED TO CHAIR: A LITTLE
MOVING FROM LYING ON BACK TO SITTING ON SIDE OF FLAT BED WITH BEDRAILS: A LITTLE
EATING MEALS: A LITTLE
CLIMB 3 TO 5 STEPS WITH RAILING: A LOT
DRESSING REGULAR UPPER BODY CLOTHING: A LITTLE
MOVING FROM LYING ON BACK TO SITTING ON SIDE OF FLAT BED WITH BEDRAILS: A LITTLE
EATING MEALS: A LITTLE
DAILY ACTIVITIY SCORE: 18

## 2025-05-28 ASSESSMENT — ACTIVITIES OF DAILY LIVING (ADL)
HEARING - RIGHT EAR: FUNCTIONAL
WALKS IN HOME: NEEDS ASSISTANCE
PATIENT'S MEMORY ADEQUATE TO SAFELY COMPLETE DAILY ACTIVITIES?: YES
HEARING - LEFT EAR: FUNCTIONAL
JUDGMENT_ADEQUATE_SAFELY_COMPLETE_DAILY_ACTIVITIES: YES
GROOMING: NEEDS ASSISTANCE
FEEDING YOURSELF: NEEDS ASSISTANCE
ADEQUATE_TO_COMPLETE_ADL: YES
LACK_OF_TRANSPORTATION: NO
TOILETING: NEEDS ASSISTANCE
BATHING: NEEDS ASSISTANCE
DRESSING YOURSELF: NEEDS ASSISTANCE
LACK_OF_TRANSPORTATION: NO

## 2025-05-28 ASSESSMENT — LIFESTYLE VARIABLES
HOW OFTEN DO YOU HAVE A DRINK CONTAINING ALCOHOL: NEVER
SKIP TO QUESTIONS 9-10: 1
HOW MANY STANDARD DRINKS CONTAINING ALCOHOL DO YOU HAVE ON A TYPICAL DAY: PATIENT DOES NOT DRINK
HOW OFTEN DO YOU HAVE 6 OR MORE DRINKS ON ONE OCCASION: NEVER
AUDIT-C TOTAL SCORE: 0
AUDIT-C TOTAL SCORE: 0

## 2025-05-28 ASSESSMENT — PAIN - FUNCTIONAL ASSESSMENT
PAIN_FUNCTIONAL_ASSESSMENT: 0-10

## 2025-05-28 ASSESSMENT — PAIN SCALES - GENERAL
PAINLEVEL_OUTOF10: 3
PAINLEVEL_OUTOF10: 0 - NO PAIN
PAINLEVEL_OUTOF10: 0 - NO PAIN
PAINLEVEL_OUTOF10: 7
PAINLEVEL_OUTOF10: 2

## 2025-05-28 ASSESSMENT — COLUMBIA-SUICIDE SEVERITY RATING SCALE - C-SSRS
2. HAVE YOU ACTUALLY HAD ANY THOUGHTS OF KILLING YOURSELF?: NO
1. IN THE PAST MONTH, HAVE YOU WISHED YOU WERE DEAD OR WISHED YOU COULD GO TO SLEEP AND NOT WAKE UP?: NO
6. HAVE YOU EVER DONE ANYTHING, STARTED TO DO ANYTHING, OR PREPARED TO DO ANYTHING TO END YOUR LIFE?: NO

## 2025-05-28 ASSESSMENT — PATIENT HEALTH QUESTIONNAIRE - PHQ9
1. LITTLE INTEREST OR PLEASURE IN DOING THINGS: NOT AT ALL
2. FEELING DOWN, DEPRESSED OR HOPELESS: NOT AT ALL
SUM OF ALL RESPONSES TO PHQ9 QUESTIONS 1 & 2: 0

## 2025-05-28 ASSESSMENT — PAIN DESCRIPTION - LOCATION: LOCATION: VAGINA

## 2025-05-28 ASSESSMENT — PAIN DESCRIPTION - DESCRIPTORS: DESCRIPTORS: DISCOMFORT;SHARP

## 2025-05-28 ASSESSMENT — PAIN DESCRIPTION - ORIENTATION: ORIENTATION: MID

## 2025-05-28 NOTE — ANESTHESIA POSTPROCEDURE EVALUATION
Patient: Madalyn Manzano    Procedure Summary       Date: 05/28/25 Room / Location: [facility]    Anesthesia Start: 0828 Anesthesia Stop: 1218    Procedure: BRACHYTHERAPY Diagnosis: Cervical cancer, FIGO stage CORNELIA    Scheduled Providers: Josue Perez MD; Thomas Robertson MD; JESSICA Horton Responsible Provider: Thomas Robertson MD    Anesthesia Type: epidural ASA Status: 2            Anesthesia Type: epidural    Vitals Value Taken Time   /53 05/28/25 12:34   Temp 36.3 05/28/25 12:34   Pulse 48 05/28/25 12:34   Resp 16 05/28/25 12:34   SpO2 100 05/28/25 12:34       Anesthesia Post Evaluation    Patient location during evaluation: bedside  Patient participation: complete - patient cannot participate  Level of consciousness: sleepy but conscious  Pain management: adequate  Airway patency: patent  Cardiovascular status: acceptable  Respiratory status: acceptable  Hydration status: acceptable  Postoperative Nausea and Vomiting: none        No notable events documented.

## 2025-05-28 NOTE — NURSING NOTE
Madalyn Manzano admitted to [facility] from ___ on 05/28/25 .  PT verified by __name and DOB__.  Anticipated discharge disposition: Home  RN signature:  Catrina

## 2025-05-28 NOTE — CARE PLAN
The patient's goals for the shift include Patient will remain free from fall and injury    The clinical goals for the shift include Patient pain <7 by end of shift    Problem: Pain - Adult  Goal: Verbalizes/displays adequate comfort level or baseline comfort level  Outcome: Progressing     Problem: Safety - Adult  Goal: Free from fall injury  Outcome: Progressing     Problem: Discharge Planning  Goal: Discharge to home or other facility with appropriate resources  Outcome: Progressing     Problem: Chronic Conditions and Co-morbidities  Goal: Patient's chronic conditions and co-morbidity symptoms are monitored and maintained or improved  Outcome: Progressing     Problem: Nutrition  Goal: Nutrient intake appropriate for maintaining nutritional needs  Outcome: Progressing

## 2025-05-28 NOTE — CONSULTS
Consults  Madalyn Manzano is a 39 y.o. year old female patient who presents for Bracytherapy with Dr. Perez on 05/28/25 . Acute Pain consulted for block for postoperative pain control.     Anticipated Postop Pain Issues -   Palliative: typically relieved with IV analgesics and regional local anesthetics  Provocative: typically with movement  Quality: typically burning and aching  Radiation: typically none  Severity: typically severe 8-10/10  Timing: typically constant    Medical History[1]     Surgical History[2]     Family History[3]     Social History     Socioeconomic History    Marital status: Single     Spouse name: Not on file    Number of children: Not on file    Years of education: Not on file    Highest education level: Not on file   Occupational History    Not on file   Tobacco Use    Smoking status: Every Day     Current packs/day: 0.50     Average packs/day: 0.5 packs/day for 29.4 years (14.7 ttl pk-yrs)     Types: Cigarettes     Start date: 1/1/1996    Smokeless tobacco: Never   Vaping Use    Vaping status: Former   Substance and Sexual Activity    Alcohol use: Yes     Comment: special occasions    Drug use: Not Currently     Types: Heroin     Comment: Pt with former drug use. Last use date 11/18/13. Heroin and cocaine.    Sexual activity: Not Currently   Other Topics Concern    Not on file   Social History Narrative    Not on file     Social Drivers of Health     Financial Resource Strain: Low Risk  (8/9/2024)    Received from [facility]    Overall Financial Resource Strain (CARDIA)     Difficulty of Paying Living Expenses: Not hard at all   Food Insecurity: No Food Insecurity (8/9/2024)    Received from [facility]    Hunger Vital Sign     Worried About Running Out of Food in the Last Year: Never true     Ran Out of Food in the Last Year: Never true   Transportation Needs: Unknown (8/9/2024)    Received from [facility]    PRAPARE -  Transportation     Lack of Transportation (Medical): Not on file     Lack of Transportation (Non-Medical): No   Physical Activity: Not on file   Stress: Not on file   Social Connections: Not on file   Intimate Partner Violence: Not on file   Housing Stability: Unknown (8/9/2024)    Received from [facility]    Housing Stability Vital Sign     Unable to Pay for Housing in the Last Year: Not on file     Number of Places Lived in the Last Year: Not on file     Unstable Housing in the Last Year: No        RX Allergies[4]      Review of Systems  Gen: No fatigue, anorexia, insomnia, fever.   Eyes: No vision loss, double vision, drainage, eye pain.   ENT: No pharyngitis, dry mouth, no hearing changes or ear discharge  Cardiac: No chest pain, palpitations, syncope, near syncope.   Pulmonary: No shortness of breath, cough, hemoptysis.   Heme/lymph: No swollen glands, fever, bleeding.   GI: No abdominal pain, change in bowel habits, melena, hematemesis, hematochezia, nausea, vomiting, diarrhea.   : No discharge, dysuria, frequency, urgency, hematuria.  Endo: No polyuria or weight loss.   Musculoskeletal: Negative for any pain or loss of ROM/weakness  Skin: No rashes or lesions  Neuro: Normal speech, no numbness or weakness. No gait difficulties  Review of systems is otherwise negative unless stated above or in history of present illness.    Physical Exam:  Constitutional:  no distress, alert and cooperative  Eyes: clear sclera  Head/Neck: No apparent injury, trachea midline  Respiratory/Thorax: Patent airways, thorax symmetric, breathing comfortably  Cardiovascular: no pitting edema  Gastrointestinal: Nondistended  Musculoskeletal: ROM intact  Extremities: no clubbing  Neurological: alert, segovia x4  Psychological: Appropriate affect    No results found for this or any previous visit (from the past 24 hours).     Madalyn Manzano is a 39 y.o. year old female patient who presents for Bracytherapy with   Ana on 05/28/25 . Acute Pain consulted for block for postoperative pain control.     Plan:    - Epidural placed by anesthesia team 05/28/25.   - Epidural PCEA with Ropivacaine 0.2%/NaCl 0.9% 500mL, Rate 10 cc/hr   - Epidural medication will not interfere with pain medication prescribed by the primary team.   - Please be aware of local anesthetic toxic dose and absorption variability before considering lidocaine patches  - Acute pain service will follow while epidural in place   - Rest of pain management per primary team    Acute Pain Resident  pg [phone] ph [phone]        [1]   Past Medical History:  Diagnosis Date    Anemia     Anxiety     Cervical cancer     Hepatitis C     untreated, likely contracted through IVDU    History of blood transfusion     Transfused Feb 2025    History of chemotherapy     Hydronephrosis     s/p bilateral nephrostomy tube placement for hydronephrosis due to obstructive cervical ca.    Thrombocytopenia    [2]   Past Surgical History:  Procedure Laterality Date    MEDIPORT     [3] No family history on file.  [4]   Allergies  Allergen Reactions    Sulfamethoxazole-Trimethoprim Hives

## 2025-05-28 NOTE — PROGRESS NOTES
Patient Name: Madalyn Manzano  MRN: [id number]  : 1985  Age: 39 y.o.  Diagnosis:   1. Malignant neoplasm of endocervix (Multi)  Rad Onc Msq Treatment Summary    Rad Onc Msq Treatment Summary        Date: 25     GYN HDR Nursing Flowsheet    Fraction: 1  Time: 3:58 PM  Assessment:     1536 - Transported patient to exam room after verifying patient name and birth date.  1543 - Dr. Perez in room and applicator connected to HDR unit without difficulty.  1555 - Treatment started without difficulty.  1606 - Treatment completed without difficulty.  1608 - Dr. Perez in room applicator/transfer cables removed without difficulty.  1630 - Discharged to floor in apparent stable condition.      Additional Notes:       By: Gema Clark RN      Electronically signed by Gema Clark RN on 25 at 3:58 PM

## 2025-05-28 NOTE — H&P
History Of Present Illness  39 y.o. female with stage CORNELIA cervical SCC, cT4 cN1a cM0, with vesicovaginal fistula, obstructive uropathy now s/p bilateral nephrostomy. She has a PET avid right external iliac lymph node. She started on carbotaxol on 1/15/2025, she was then started on concurrent chemoradiation with weekly Cisplatin (last on 4/22/2025), and has completed EBRT to the pelvis on 4/28, 45Gy in 25fx.    Presenting today for brachytherapy admission (mac applicator). Tolerated procedure well with epidural placement.     Past Medical History  She has a past medical history of Anemia, Anxiety, Cervical cancer, Hepatitis C, History of blood transfusion, History of chemotherapy, Hydronephrosis, and Thrombocytopenia.    Surgical History  She has a past surgical history that includes mediport.    Social History  She reports that she has been smoking cigarettes. She started smoking about 29 years ago. She has a 14.7 pack-year smoking history. She has never used smokeless tobacco. She reports current alcohol use. She reports that she does not currently use drugs after having used the following drugs: Heroin.     Allergies  Sulfamethoxazole-trimethoprim    Review of Systems   All other systems reviewed and are negative.       Physical Exam  Vitals reviewed.   Constitutional:       Appearance: Normal appearance.   HENT:      Head: Normocephalic and atraumatic.   Eyes:      Extraocular Movements: Extraocular movements intact.   Pulmonary:      Effort: Pulmonary effort is normal.   Abdominal:      General: There is no distension.   Musculoskeletal:      Cervical back: Normal range of motion.      Comments: BLEs symmetrical   Skin:     General: Skin is warm and dry.   Neurological:      General: No focal deficit present.      Mental Status: She is alert and oriented to person, place, and time.   Psychiatric:         Mood and Affect: Mood normal.         Behavior: Behavior normal.          Last Recorded Vitals  Blood  "pressure 109/70, pulse 60, temperature 36 °C (96.8 °F), temperature source Temporal, resp. rate 16, height 1.575 m (5' 2\"), weight 63.5 kg (140 lb), SpO2 98%.       Assessment/Plan   39 y.o. female with stage CORNELIA cervical SCC presenting for brachytherapy admission (mac applicator)    Brachytherapy  - S/p successful MAC applicator placement on 5/28  - To complete total of 5 fx this admission (5/28 PM, 5/29 AM, 5/29 PM, 5/30 AM, 5/30 PM)  - Pain control with epidural, acute pain team following  - CLD, adjunctive/supportive medications ordered  - DVT prophylaxis: heparin TID (to be held after 8am on Friday 5/30), SCDs     Known Obstructive Uropathy  - Bilateral nephrostomy tubes in place  - Next PCNT exchange 6/2    Comorbidities  - Hepatitis C per chart review, not on treatment  - Mood: home duloxetine/prazosin/ativan ordered  - Suboxone held on admission    Dispo: admit for brachytherapy treatment    To be staffed with Dr. Quiroz in AM.  Ian Chaudhary MD, PGY-3  Gynecologic Oncology  Pager [phone]   "

## 2025-05-28 NOTE — ANESTHESIA PREPROCEDURE EVALUATION
Patient: Madalyn Manzano    Procedure Information       Date/Time: 05/28/25 0872    Scheduled providers: Josue Perez MD; Thomas Robertson MD; JESSICA Horton    Procedure: BRACHYTHERAPY    Location: [facility]            Relevant Problems   Anesthesia (within normal limits)      Cardiac (within normal limits)      Pulmonary (within normal limits)      /Renal (within normal limits)      Liver (within normal limits)      Endocrine (within normal limits)      GYN   (+) Cervical cancer, FIGO stage CORNELIA   The patient has had no previous general anesthesia, and has no h/o anesthesia problems in the family.  The patient has had previous epidural anesthesia (x1) without complications.      Clinical information reviewed:                   NPO Detail:  No data recorded     Physical Exam    Airway  Mallampati: II  TM distance: >3 FB  Neck ROM: full     Cardiovascular - normal exam  Rhythm: regular  Rate: normal     Dental - normal exam    (+) upper dentures     Pulmonary - normal exam   Abdominal      Other findings: Lower partials        Anesthesia Plan    History of general anesthesia?: no  History of complications of general anesthesia?: unknown/emergency    ASA 2     epidural     Anesthetic plan and risks discussed with patient.  Use of blood products discussed with patient who consented to blood products.    Plan discussed with CAA and attending.

## 2025-05-28 NOTE — ANESTHESIA PROCEDURE NOTES
Epidural Block    Patient location during procedure: OR  Start time: 5/28/2025 8:30 AM  End time: 5/28/2025 12:00 AM  Reason for block: at surgeon's request  Staffing  Performed: attending   Authorized by: Thomas Robertson MD    Performed by: JESSICA Horton    Preanesthetic Checklist  Completed: patient identified, IV checked, risks and benefits discussed, surgical consent, pre-op evaluation, timeout performed and sterile techniques followed  Block Timeout  RN/Licensed healthcare professional reads aloud to the Anesthesia provider and entire team: Patient identity, procedure with side and site, patient position, and as applicable the availability of implants/special equipment/special requirements.  Patient on coagulant treatment: yes  Timeout performed at: 5/28/2025 8:30 AM  Block Placement  Patient position: sitting  Prep: ChloraPrep  Sterility prep: cap, drape, gloves and mask  Sedation level: no sedation  Patient monitoring: blood pressure, continuous pulse oximetry and heart rate  Approach: midline  Local numbing: lidocaine 1% to skin and subcutaneous tissues  Vertebral space: lumbar  Lumbar location: L3-L4  Epidural  Loss of resistance technique: saline  Guidance: landmark technique        Needle  Needle type: Tuohy   Needle gauge: 17  Needle length: 8.9cm  Needle insertion depth: 7 cm  Catheter type: multi-orifice  Catheter size: 19 G  Catheter at skin depth: 11 cm  Catheter securement method: clear occlusive dressing and liquid medical adhesive    Test dose: lidocaine 1.5% with epinephrine 1-to-200,000  Test dose: lidocaine 1.5% with epinephrine 1-to-200,000  Test dose result: no positive test dose            Assessment  Sensory level: T8. bilateral  Block outcome: patient comfortable  Number of attempts: 2  Events: no positive test dose  Procedure assessment: patient tolerated procedure well with no immediate complications

## 2025-05-29 ENCOUNTER — HOSPITAL ENCOUNTER (OUTPATIENT)
Dept: RADIOLOGY | Facility: EXTERNAL LOCATION | Age: 40
Discharge: HOME | End: 2025-05-29

## 2025-05-29 ENCOUNTER — HOSPITAL ENCOUNTER (OUTPATIENT)
Dept: RADIATION ONCOLOGY | Facility: HOSPITAL | Age: 40
Setting detail: RADIATION/ONCOLOGY SERIES
Discharge: HOME | End: 2025-05-29
Payer: COMMERCIAL

## 2025-05-29 DIAGNOSIS — C53.0 MALIGNANT NEOPLASM OF ENDOCERVIX (MULTI): ICD-10-CM

## 2025-05-29 DIAGNOSIS — C53.9 CERVICAL CANCER, FIGO STAGE IVA: ICD-10-CM

## 2025-05-29 DIAGNOSIS — C53.0 MALIGNANT NEOPLASM OF ENDOCERVIX (MULTI): Primary | ICD-10-CM

## 2025-05-29 LAB
ANION GAP SERPL CALC-SCNC: 12 MMOL/L (ref 10–20)
BUN SERPL-MCNC: 4 MG/DL (ref 6–23)
CALCIUM SERPL-MCNC: 8.7 MG/DL (ref 8.6–10.6)
CHLORIDE SERPL-SCNC: 108 MMOL/L (ref 98–107)
CO2 SERPL-SCNC: 25 MMOL/L (ref 21–32)
CREAT SERPL-MCNC: 0.5 MG/DL (ref 0.5–1.05)
EGFRCR SERPLBLD CKD-EPI 2021: >90 ML/MIN/1.73M*2
ERYTHROCYTE [DISTWIDTH] IN BLOOD BY AUTOMATED COUNT: 23.6 % (ref 11.5–14.5)
GLUCOSE SERPL-MCNC: 75 MG/DL (ref 74–99)
HCT VFR BLD AUTO: 26.2 % (ref 36–46)
HGB BLD-MCNC: 8.6 G/DL (ref 12–16)
MAGNESIUM SERPL-MCNC: 1.57 MG/DL (ref 1.6–2.4)
MCH RBC QN AUTO: 34.4 PG (ref 26–34)
MCHC RBC AUTO-ENTMCNC: 32.8 G/DL (ref 32–36)
MCV RBC AUTO: 105 FL (ref 80–100)
NRBC BLD-RTO: 0.4 /100 WBCS (ref 0–0)
PLATELET # BLD AUTO: 243 X10*3/UL (ref 150–450)
POTASSIUM SERPL-SCNC: 4.4 MMOL/L (ref 3.5–5.3)
RAD ONC MSQ ACTUAL FRACTIONS DELIVERED: 2
RAD ONC MSQ ACTUAL FRACTIONS DELIVERED: 3
RAD ONC MSQ ACTUAL SESSION DELIVERED DOSE: 600 CGRAY
RAD ONC MSQ ACTUAL SESSION DELIVERED DOSE: 600 CGRAY
RAD ONC MSQ ACTUAL TOTAL DOSE: 1200 CGRAY
RAD ONC MSQ ACTUAL TOTAL DOSE: 1800 CGRAY
RAD ONC MSQ ELAPSED DAYS: 1
RAD ONC MSQ ELAPSED DAYS: 1
RAD ONC MSQ LAST DATE: NORMAL
RAD ONC MSQ LAST DATE: NORMAL
RAD ONC MSQ PRESCRIBED FRACTIONAL DOSE: 600 CGRAY
RAD ONC MSQ PRESCRIBED FRACTIONAL DOSE: 600 CGRAY
RAD ONC MSQ PRESCRIBED NUMBER OF FRACTIONS: 5
RAD ONC MSQ PRESCRIBED NUMBER OF FRACTIONS: 5
RAD ONC MSQ PRESCRIBED TECHNIQUE: NORMAL
RAD ONC MSQ PRESCRIBED TECHNIQUE: NORMAL
RAD ONC MSQ PRESCRIBED TOTAL DOSE: 3000 CGRAY
RAD ONC MSQ PRESCRIBED TOTAL DOSE: 3000 CGRAY
RAD ONC MSQ PRESCRIPTION PATTERN COMMENT: NORMAL
RAD ONC MSQ PRESCRIPTION PATTERN COMMENT: NORMAL
RAD ONC MSQ START DATE: NORMAL
RAD ONC MSQ START DATE: NORMAL
RAD ONC MSQ TREATMENT COURSE NUMBER: 1
RAD ONC MSQ TREATMENT COURSE NUMBER: 1
RAD ONC MSQ TREATMENT SITE: NORMAL
RAD ONC MSQ TREATMENT SITE: NORMAL
RBC # BLD AUTO: 2.5 X10*6/UL (ref 4–5.2)
SODIUM SERPL-SCNC: 141 MMOL/L (ref 136–145)
WBC # BLD AUTO: 5 X10*3/UL (ref 4.4–11.3)

## 2025-05-29 PROCEDURE — 77318 BRACHYTX ISODOSE COMPLEX: CPT | Performed by: RADIOLOGY

## 2025-05-29 PROCEDURE — 2500000004 HC RX 250 GENERAL PHARMACY W/ HCPCS (ALT 636 FOR OP/ED)

## 2025-05-29 PROCEDURE — C1717 BRACHYTX, NON-STR,HDR IR-192: HCPCS | Performed by: RADIOLOGY

## 2025-05-29 PROCEDURE — 2500000002 HC RX 250 W HCPCS SELF ADMINISTERED DRUGS (ALT 637 FOR MEDICARE OP, ALT 636 FOR OP/ED)

## 2025-05-29 PROCEDURE — C1717 BRACHYTX, NON-STR,HDR IR-192: HCPCS | Mod: 76 | Performed by: RADIOLOGY

## 2025-05-29 PROCEDURE — 2500000002 HC RX 250 W HCPCS SELF ADMINISTERED DRUGS (ALT 637 FOR MEDICARE OP, ALT 636 FOR OP/ED): Performed by: STUDENT IN AN ORGANIZED HEALTH CARE EDUCATION/TRAINING PROGRAM

## 2025-05-29 PROCEDURE — 2500000001 HC RX 250 WO HCPCS SELF ADMINISTERED DRUGS (ALT 637 FOR MEDICARE OP)

## 2025-05-29 PROCEDURE — 96372 THER/PROPH/DIAG INJ SC/IM: CPT | Performed by: STUDENT IN AN ORGANIZED HEALTH CARE EDUCATION/TRAINING PROGRAM

## 2025-05-29 PROCEDURE — 77290 THER RAD SIMULAJ FIELD CPLX: CPT | Performed by: RADIOLOGY

## 2025-05-29 PROCEDURE — 83735 ASSAY OF MAGNESIUM: CPT

## 2025-05-29 PROCEDURE — 99231 SBSQ HOSP IP/OBS SF/LOW 25: CPT | Performed by: ANESTHESIOLOGY

## 2025-05-29 PROCEDURE — 2500000004 HC RX 250 GENERAL PHARMACY W/ HCPCS (ALT 636 FOR OP/ED): Performed by: STUDENT IN AN ORGANIZED HEALTH CARE EDUCATION/TRAINING PROGRAM

## 2025-05-29 PROCEDURE — 82374 ASSAY BLOOD CARBON DIOXIDE: CPT

## 2025-05-29 PROCEDURE — 1170000001 HC PRIVATE ONCOLOGY ROOM DAILY

## 2025-05-29 PROCEDURE — 2500000004 HC RX 250 GENERAL PHARMACY W/ HCPCS (ALT 636 FOR OP/ED): Mod: JZ | Performed by: NURSE PRACTITIONER

## 2025-05-29 PROCEDURE — 85027 COMPLETE CBC AUTOMATED: CPT

## 2025-05-29 PROCEDURE — 2500000001 HC RX 250 WO HCPCS SELF ADMINISTERED DRUGS (ALT 637 FOR MEDICARE OP): Performed by: STUDENT IN AN ORGANIZED HEALTH CARE EDUCATION/TRAINING PROGRAM

## 2025-05-29 PROCEDURE — 77012 CT SCAN FOR NEEDLE BIOPSY: CPT | Performed by: RADIOLOGY

## 2025-05-29 RX ORDER — NALOXONE HYDROCHLORIDE 0.4 MG/ML
0.2 INJECTION, SOLUTION INTRAMUSCULAR; INTRAVENOUS; SUBCUTANEOUS AS NEEDED
Status: DISCONTINUED | OUTPATIENT
Start: 2025-05-29 | End: 2025-05-30 | Stop reason: HOSPADM

## 2025-05-29 RX ORDER — HYDROMORPHONE HCL/0.9% NACL/PF 15 MG/30ML
PATIENT CONTROLLED ANALGESIA SYRINGE INTRAVENOUS CONTINUOUS
Refills: 0 | Status: DISCONTINUED | OUTPATIENT
Start: 2025-05-29 | End: 2025-05-30

## 2025-05-29 RX ORDER — DULOXETIN HYDROCHLORIDE 30 MG/1
30 CAPSULE, DELAYED RELEASE ORAL NIGHTLY
Status: DISCONTINUED | OUTPATIENT
Start: 2025-05-29 | End: 2025-05-30 | Stop reason: HOSPADM

## 2025-05-29 RX ORDER — MAGNESIUM SULFATE HEPTAHYDRATE 40 MG/ML
4 INJECTION, SOLUTION INTRAVENOUS ONCE
Status: COMPLETED | OUTPATIENT
Start: 2025-05-29 | End: 2025-05-29

## 2025-05-29 RX ADMIN — DIPHENOXYLATE HYDROCHLORIDE AND ATROPINE SULFATE 2 TABLET: .025; 2.5 TABLET ORAL at 06:12

## 2025-05-29 RX ADMIN — HEPARIN SODIUM 5000 UNITS: 5000 INJECTION, SOLUTION INTRAVENOUS; SUBCUTANEOUS at 11:44

## 2025-05-29 RX ADMIN — DULOXETINE 30 MG: 30 CAPSULE, DELAYED RELEASE ORAL at 21:04

## 2025-05-29 RX ADMIN — BUPRENORPHINE AND NALOXONE 1 FILM: 2; .5 FILM BUCCAL; SUBLINGUAL at 22:55

## 2025-05-29 RX ADMIN — LOPERAMIDE HYDROCHLORIDE 2 MG: 2 CAPSULE ORAL at 01:06

## 2025-05-29 RX ADMIN — DIPHENOXYLATE HYDROCHLORIDE AND ATROPINE SULFATE 2 TABLET: .025; 2.5 TABLET ORAL at 21:04

## 2025-05-29 RX ADMIN — HEPARIN SODIUM 5000 UNITS: 5000 INJECTION, SOLUTION INTRAVENOUS; SUBCUTANEOUS at 03:18

## 2025-05-29 RX ADMIN — HEPARIN SODIUM 5000 UNITS: 5000 INJECTION, SOLUTION INTRAVENOUS; SUBCUTANEOUS at 21:03

## 2025-05-29 RX ADMIN — LORAZEPAM 1 MG: 1 TABLET ORAL at 21:04

## 2025-05-29 RX ADMIN — LOPERAMIDE HYDROCHLORIDE 2 MG: 2 CAPSULE ORAL at 21:09

## 2025-05-29 RX ADMIN — PRAZOSIN HYDROCHLORIDE 1 MG: 1 CAPSULE ORAL at 21:04

## 2025-05-29 RX ADMIN — Medication: at 07:27

## 2025-05-29 RX ADMIN — MAGNESIUM SULFATE HEPTAHYDRATE 4 G: 40 INJECTION, SOLUTION INTRAVENOUS at 11:44

## 2025-05-29 RX ADMIN — DIPHENOXYLATE HYDROCHLORIDE AND ATROPINE SULFATE 2 TABLET: .025; 2.5 TABLET ORAL at 13:20

## 2025-05-29 ASSESSMENT — COGNITIVE AND FUNCTIONAL STATUS - GENERAL
CLIMB 3 TO 5 STEPS WITH RAILING: A LOT
TURNING FROM BACK TO SIDE WHILE IN FLAT BAD: A LITTLE
STANDING UP FROM CHAIR USING ARMS: A LITTLE
DRESSING REGULAR UPPER BODY CLOTHING: A LITTLE
CLIMB 3 TO 5 STEPS WITH RAILING: A LITTLE
DRESSING REGULAR UPPER BODY CLOTHING: A LITTLE
MOVING FROM LYING ON BACK TO SITTING ON SIDE OF FLAT BED WITH BEDRAILS: A LITTLE
TOILETING: A LITTLE
MOBILITY SCORE: 18
MOVING FROM LYING ON BACK TO SITTING ON SIDE OF FLAT BED WITH BEDRAILS: A LITTLE
TOILETING: A LITTLE
STANDING UP FROM CHAIR USING ARMS: A LITTLE
PERSONAL GROOMING: A LITTLE
HELP NEEDED FOR BATHING: A LITTLE
WALKING IN HOSPITAL ROOM: A LOT
MOBILITY SCORE: 16
MOVING TO AND FROM BED TO CHAIR: A LITTLE
DRESSING REGULAR LOWER BODY CLOTHING: A LITTLE
HELP NEEDED FOR BATHING: A LITTLE
DRESSING REGULAR LOWER BODY CLOTHING: A LITTLE
DAILY ACTIVITIY SCORE: 19
TURNING FROM BACK TO SIDE WHILE IN FLAT BAD: A LITTLE
DAILY ACTIVITIY SCORE: 19
MOVING TO AND FROM BED TO CHAIR: A LITTLE
WALKING IN HOSPITAL ROOM: A LITTLE
PERSONAL GROOMING: A LITTLE

## 2025-05-29 ASSESSMENT — PAIN SCALES - GENERAL
PAINLEVEL_OUTOF10: 5 - MODERATE PAIN
PAINLEVEL_OUTOF10: 6

## 2025-05-29 ASSESSMENT — PAIN - FUNCTIONAL ASSESSMENT
PAIN_FUNCTIONAL_ASSESSMENT: 0-10
PAIN_FUNCTIONAL_ASSESSMENT: 0-10

## 2025-05-29 NOTE — CARE PLAN
The patient's goals for the shift include Patient will remain free from fall and injury    The clinical goals for the shift include Pt will remain HDS    Problem: Pain - Adult  Goal: Verbalizes/displays adequate comfort level or baseline comfort level  Outcome: Progressing     Problem: Safety - Adult  Goal: Free from fall injury  Outcome: Progressing     Problem: Discharge Planning  Goal: Discharge to home or other facility with appropriate resources  Outcome: Progressing     Problem: Chronic Conditions and Co-morbidities  Goal: Patient's chronic conditions and co-morbidity symptoms are monitored and maintained or improved  Outcome: Progressing     Problem: Nutrition  Goal: Nutrient intake appropriate for maintaining nutritional needs  Outcome: Progressing     Problem: Fall/Injury  Goal: Not fall by end of shift  Outcome: Progressing  Goal: Be free from injury by end of the shift  Outcome: Progressing  Goal: Verbalize understanding of personal risk factors for fall in the hospital  Outcome: Progressing  Goal: Verbalize understanding of risk factor reduction measures to prevent injury from fall in the home  Outcome: Progressing  Goal: Use assistive devices by end of the shift  Outcome: Progressing  Goal: Pace activities to prevent fatigue by end of the shift  Outcome: Progressing     Problem: Pain  Goal: Takes deep breaths with improved pain control throughout the shift  Outcome: Progressing  Goal: Turns in bed with improved pain control throughout the shift  Outcome: Progressing  Goal: Walks with improved pain control throughout the shift  Outcome: Progressing  Goal: Performs ADL's with improved pain control throughout shift  Outcome: Progressing  Goal: Participates in PT with improved pain control throughout the shift  Outcome: Progressing  Goal: Free from opioid side effects throughout the shift  Outcome: Progressing  Goal: Free from acute confusion related to pain meds throughout the shift  Outcome: Progressing      Problem: Skin  Goal: Decreased wound size/increased tissue granulation at next dressing change  Outcome: Progressing  Flowsheets (Taken 5/29/2025 1214)  Decreased wound size/increased tissue granulation at next dressing change: Protective dressings over bony prominences  Goal: Participates in plan/prevention/treatment measures  Outcome: Progressing  Flowsheets (Taken 5/29/2025 1214)  Participates in plan/prevention/treatment measures: Elevate heels  Goal: Prevent/manage excess moisture  Outcome: Progressing  Flowsheets (Taken 5/29/2025 1214)  Prevent/manage excess moisture: Monitor for/manage infection if present  Goal: Prevent/minimize sheer/friction injuries  Outcome: Progressing  Flowsheets (Taken 5/29/2025 1214)  Prevent/minimize sheer/friction injuries:   Use pull sheet   HOB 30 degrees or less  Goal: Promote/optimize nutrition  Outcome: Progressing  Flowsheets (Taken 5/29/2025 1214)  Promote/optimize nutrition: Monitor/record intake including meals  Goal: Promote skin healing  Outcome: Progressing  Flowsheets (Taken 5/29/2025 1214)  Promote skin healing: Turn/reposition every 2 hours/use positioning/transfer devices

## 2025-05-29 NOTE — DISCHARGE INSTRUCTIONS
Call your provider if you have:  Vaginal bleeding soaking >2 pads per hour for 2 hours  Temperature greater than 100.4  Persistent nausea and vomiting  Uncontrolled pain   Any other questions or concerns you may have after discharge    In an emergency, call 911 or go to an Emergency Department at a nearby hospital.

## 2025-05-29 NOTE — PROGRESS NOTES
Pharmacy Medication History Review    Madalyn Manzano is a 39 y.o. female admitted for No Principal Problem: There is no principal problem currently on the Problem List. Please update the Problem List and refresh.. Pharmacy reviewed the patient's [id number] medications and allergies for accuracy.    Medications ADDED:  None   Medications CHANGED:  Duloxetine 20 mg dose increased to Duloxetine 30 mg   Patient taking tizanidine 4 mg differently than prescribed - patient prescribed tizanidine 4 mg 1 tab by mouth three time daily - patient taking tizanidine 4 mg 1 tab by mouth once daily   Medications REMOVED:   Golytely 236-22.74-6.74-5.86 gram solution  - duplicate entry   Golytely 236-22.74-6.74-5.86 gram solution - patient never took   Ondansetron 8 mg     The list below reflects the updated PTA list.   Prior to Admission Medications   Prescriptions Last Dose Informant   DULoxetine (Cymbalta) 30 mg DR capsule 5/27/2025 Evening Self   Sig: Take 1 capsule (30 mg) by mouth once daily. Do not crush or chew.   HYDROcodone-acetaminophen (Hycet) 5-325 mg tablet  5/27/2025 Evening Self   Sig: Take by mouth every three times daily if needed for severe pain (7 - 10).   LORazepam (Ativan) 1 mg tablet 5/27/2025 Evening Self   Sig: Take 1 tablet (1 mg) by mouth every 6 hours if needed for anxiety.   buprenorphine-naloxone (Suboxone) 2-0.5 mg SL film 5/27/2025 Evening Self   Sig: Place 1 film  under the tongue twice daily for 30 days .   multivitamin tablet 5/28/2025 Morning Self   Sig: Take 1 tablet by mouth once daily.   prazosin (Minipress) 1 mg capsule 5/27/2025 Evening Self   Sig: Take 1 capsule (1 mg) by mouth once daily at bedtime.   tiZANidine (Zanaflex) 4 mg tablet 5/27/2025 Evening Self   Sig: Take 1 capsule (4 mg) by mouth nightly prn.      Facility-Administered Medications Last Administration Doses Remaining   polyethylene glycol-electrolytes (Nulytely) solution 4,000 mL None recorded 1           The list below  "reflects the updated allergy list. Please review each documented allergy for additional clarification and justification.  Allergies  Reviewed by Shashank Mueller on 5/28/2025        Severity Reactions Comments    Sulfamethoxazole-trimethoprim Not Specified Hives             Patient accepts M2B at discharge.     Sources:   5/28/2025 [facility] CLINICAL SUMMARY   Patient interview   Epic dispense history  Epic allergy list   OARRS ( yes )     Additional Comments:  Patient knows the name and frequency for Duloxetine however patient is unsure of strength - per epic fill history patient takes Duloxetine 30 mg [facility]28[facility]20[facility] patient takes Duloxetine 30 mg 1 tab by mouth once daily     OARRS   5/21/2025 SUBOXONE 2-0.5 MG SL film DS: 30 QTY: 60   5/21/2025 HYDROCODONE-ACETAMINOPHEN 5-325 MG DS: 30 QTY: 90  4/16/2025 LORAZEPAM 1 MG DS: 30 QTY: 90   12/18/2024 GABAPENTIN 100 MG DS: 30 QTY: 90     Shashank Mueller  Pharmacy Technician  05/28/25     Secure Chat preferred   If no response call [phone] or [employer] \"Med Rec\"   "

## 2025-05-29 NOTE — PROGRESS NOTES
Patient Name: Madalyn Manzano  MRN: [id number]  : 1985  Age: 39 y.o.  Diagnosis:   1. Malignant neoplasm of endocervix (Multi)  Rad Onc Msq Treatment Summary    Rad Onc Msq Treatment Summary        Date: 25     GYN HDR Nursing Flowsheet    Fraction: 3  Time: 5:29 PM  Assessment:     1646 - Escorted/transported patient from exam room after verifying patient name and birth date.  1650 - Dr. Roche in room and applicator connected to HDR unit without difficulty.  165 - Treatment started without difficulty.  171 - Treatment completed without difficulty.  171 - Dr. Roche in room applicator/transfer cables removed without difficulty.  1745 - Discharged to floor in apparent stable condition.      Additional Notes: pt awilda treatment well.      By: Gema Clark RN      Electronically signed by Gema Clark RN on 25 at 5:29 PM

## 2025-05-29 NOTE — CARE PLAN
The clinical goals for the shift include Pt will remain HDS    Problem: Pain - Adult  Goal: Verbalizes/displays adequate comfort level or baseline comfort level  5/29/2025 0529 by Jeffrey Don RN  Outcome: Progressing  Flowsheets (Taken 5/29/2025 0528)  Verbalizes/displays adequate comfort level or baseline comfort level:   Encourage patient to monitor pain and request assistance   Assess pain using appropriate pain scale   Administer analgesics based on type and severity of pain and evaluate response   Implement non-pharmacological measures as appropriate and evaluate response   Consider cultural and social influences on pain and pain management  5/29/2025 0528 by Jeffrey Don RN  Outcome: Progressing  Flowsheets (Taken 5/29/2025 0528)  Verbalizes/displays adequate comfort level or baseline comfort level:   Encourage patient to monitor pain and request assistance   Assess pain using appropriate pain scale   Administer analgesics based on type and severity of pain and evaluate response   Implement non-pharmacological measures as appropriate and evaluate response   Consider cultural and social influences on pain and pain management     Problem: Safety - Adult  Goal: Free from fall injury  5/29/2025 0529 by Jeffrey Don RN  Outcome: Progressing  Flowsheets (Taken 5/29/2025 0529)  Free from fall injury: Instruct family/caregiver on patient safety  5/29/2025 0528 by Jeffrey Don RN  Outcome: Progressing  Flowsheets (Taken 5/29/2025 0528)  Free from fall injury: Instruct family/caregiver on patient safety     Problem: Fall/Injury  Goal: Not fall by end of shift  Outcome: Progressing  Goal: Be free from injury by end of the shift  Outcome: Progressing     Problem: Pain  Goal: Takes deep breaths with improved pain control throughout the shift  Outcome: Progressing

## 2025-05-29 NOTE — PROGRESS NOTES
"Madalyn Manzano is a 39 y.o. female on day 0 of admission presenting with cervical SCC admitted for brachytherapy.    Subjective   Currently having a lot of pain, tearful from how much pain she is having but rates currently 4/10. Drinking well, denies n/v, CP, SOB. Amenable to idea of PCA pump for pain control.     Objective     Physical Exam  Constitutional: No visible distress, alert and cooperative  Eyes: clear sclera  Head/Neck: Normocephalic  Respiratory/Thorax: Normal respiratory effort on RA, CTAB  Cardiovascular: RRR, no murmurs, rubs or gallops  Gastrointestinal: Abdomen soft, no tenderness to palpation, without rebound or guarding  Musculoskeletal: grossly normal ROM  Extremities: BLEs symmetrical, trace edema noted bilaterally  Neurological: A&Ox3  Psychological: Appropriate mood and behavior  Skin: warm, dry, no lesions      Last Recorded Vitals  Blood pressure 96/60, pulse 66, temperature 36.4 °C (97.5 °F), temperature source Temporal, resp. rate 17, height 1.575 m (5' 2\"), weight 63.5 kg (140 lb), SpO2 96%.  Intake/Output last 3 Shifts:  I/O last 3 completed shifts:  In: 1440 (22.7 mL/kg) [P.O.:240; I.V.:1200 (18.9 mL/kg)]  Out: 850 (13.4 mL/kg) [Urine:850 (0.4 mL/kg/hr)]  Weight: 63.5 kg     Relevant Results  Lab Results   Component Value Date    WBC 5.0 05/29/2025    HGB 8.6 (L) 05/29/2025    HCT 26.2 (L) 05/29/2025     (H) 05/29/2025     05/29/2025      Assessment & Plan    39 y.o. female with stage CORNELIA cervical SCC presenting for brachytherapy admission (mac applicator)     Brachytherapy  - S/p successful MAC applicator placement on 5/28  - To complete total of 5 fx this admission (5/28 PM, 5/29 AM, 5/29 PM, 5/30 AM, 5/30 PM). S/p first treatment.  - Pain control with epidural, acute pain team following. Currently with poor control, PCA ordered. Home suboxone re-ordered.  - CLD, adjunctive/supportive medications ordered  - DVT prophylaxis: heparin TID (to be held after 8am on Friday " 5/30), SCDs      Known Obstructive Uropathy  - Bilateral nephrostomy tubes in place, draining clear urine  - Next PCNT exchange 6/2     Comorbidities  - Hepatitis C per chart review, not on treatment - will discuss on rounds  - Mood: home duloxetine/prazosin/ativan ordered  - Suboxone re-ordered given significant pain as above     Dispo: admit for brachytherapy treatment    Patient seen and discussed with Dr. Richie James MD PGY-1

## 2025-05-29 NOTE — PROGRESS NOTES
05/29/25 1100   Discharge Planning   Living Arrangements Spouse/significant other   Support Systems Spouse/significant other   Assistance Needed none   Type of Residence Private residence   Who is requesting discharge planning? Provider   Home or Post Acute Services None   Expected Discharge Disposition Home     39 y.o. female with stage CORNELIA cervical SCC presenting for brachytherapy admission (mac applicator).     This SW/TCC met with pt to introduce self and role.  Per the medical team, this patient has no anticipated discharge needs; full assessment deferred at this time.  Please advise SW/TCC if discharge planning needs arise. ADOD 5/30, HNN. Margaret Mckeon, RN TCC

## 2025-05-29 NOTE — PROGRESS NOTES
Patient Name: Madalyn Manzano  MRN: [id number]  : 1985  Age: 39 y.o.  Diagnosis:   1. Malignant neoplasm of endocervix (Multi)  Rad Onc CT Sim Images        Date: 25     GYN HDR Nursing Flowsheet    Fraction: 2  Time: 11:03 AM  Assessment:     1043 - Transported patient to treatment room after verifying patient name and birth date.  1047 - Dr. Roche in room and applicator connected to HDR unit without difficulty.  1058 - Treatment started without difficulty.  1112 - Treatment completed without difficulty.  1114 - Dr. Roche in room applicator/transfer cables removed without difficulty.  1130 - Discharged to home/floor in apparent stable condition.    Additional Notes: pt awilda treatment well.      By: Gema Clark RN      Electronically signed by Gema Clark RN on 25 at 11:03 AM

## 2025-05-29 NOTE — PROGRESS NOTES
Postop Pain HPI -   Palliative: relieved with IV analgesics and regional local anesthetics  Provocative: movement  Quality:  burning and aching  Radiation:  none  Severity:  4/10  Timing: constant    24-HOUR OPIOID CONSUMPTION:  Dilaudid PCA     Scheduled medications  Scheduled Medications[1]  Continuous medications  Continuous Medications[2]  PRN medications  PRN Medications[3]     Physical Exam:  Constitutional:  no distress, alert and cooperative  Eyes: clear sclera  Head/Neck: No apparent injury, trachea midline  Respiratory/Thorax: Patent airways, thorax symmetric, breathing comfortably  Cardiovascular: no pitting edema  Gastrointestinal: Nondistended  Musculoskeletal: ROM intact  Extremities: no clubbing  Neurological: alert, segovia x4  Psychological: Appropriate affect    Results for orders placed or performed during the hospital encounter of 05/28/25 (from the past 24 hours)   CBC   Result Value Ref Range    WBC 5.0 4.4 - 11.3 x10*3/uL    nRBC 0.4 (H) 0.0 - 0.0 /100 WBCs    RBC 2.50 (L) 4.00 - 5.20 x10*6/uL    Hemoglobin 8.6 (L) 12.0 - 16.0 g/dL    Hematocrit 26.2 (L) 36.0 - 46.0 %     (H) 80 - 100 fL    MCH 34.4 (H) 26.0 - 34.0 pg    MCHC 32.8 32.0 - 36.0 g/dL    RDW 23.6 (H) 11.5 - 14.5 %    Platelets 243 150 - 450 x10*3/uL   Basic metabolic panel   Result Value Ref Range    Glucose 75 74 - 99 mg/dL    Sodium 141 136 - 145 mmol/L    Potassium 4.4 3.5 - 5.3 mmol/L    Chloride 108 (H) 98 - 107 mmol/L    Bicarbonate 25 21 - 32 mmol/L    Anion Gap 12 10 - 20 mmol/L    Urea Nitrogen 4 (L) 6 - 23 mg/dL    Creatinine 0.50 0.50 - 1.05 mg/dL    eGFR >90 >60 mL/min/1.73m*2    Calcium 8.7 8.6 - 10.6 mg/dL   Magnesium   Result Value Ref Range    Magnesium 1.57 (L) 1.60 - 2.40 mg/dL       Madalyn Manzano is a 39 y.o. year old female patient who presents for Bracytherapy with Dr. Perez on 05/28/25 . Acute Pain consulted for block for postoperative pain control.      Plan:     - Epidural placed by anesthesia team  05/28/25.   - c/w Epidural PCEA with Ropivacaine 0.2%/NaCl 0.9% 500mL, Rate 10 cc/hr   - Epidural medication will not interfere with pain medication prescribed by the primary team.   - Please be aware of local anesthetic toxic dose and absorption variability before considering lidocaine patches  - Acute pain service will follow while epidural in place   - Rest of pain management per primary team     Acute Pain Resident  pg [phone] ph [phone]        [1] buprenorphine-naloxone, 1 Film, sublingual, Nightly  diphenoxylate-atropine, 2 tablet, oral, 4x daily  DULoxetine, 30 mg, oral, Nightly  heparin (porcine), 5,000 Units, subcutaneous, q8h  loperamide, 2 mg, oral, q8h  magnesium sulfate, 4 g, intravenous, Once  prazosin, 1 mg, oral, Nightly  [2] HYDROmorphone,   [3] PRN medications: LORazepam, naloxone, simethicone

## 2025-05-29 NOTE — HOSPITAL COURSE
Patient was admitted on 5/28 for scheduled brachytherapy treatment. She underwent 5 total fractions. She had the brachytherapy device placed and removed without complication. Please see rad onc documentation for further details. Pain was controlled with epidural and PCA. On day of discharge, she was able to tolerate a PO diet and medications, void, and ambulate.

## 2025-05-30 ENCOUNTER — HOSPITAL ENCOUNTER (OUTPATIENT)
Dept: RADIATION ONCOLOGY | Facility: HOSPITAL | Age: 40
Setting detail: RADIATION/ONCOLOGY SERIES
Discharge: HOME | End: 2025-05-30
Payer: COMMERCIAL

## 2025-05-30 ENCOUNTER — HOSPITAL ENCOUNTER (OUTPATIENT)
Dept: RADIOLOGY | Facility: EXTERNAL LOCATION | Age: 40
Discharge: HOME | End: 2025-05-30

## 2025-05-30 VITALS
TEMPERATURE: 97.8 F | SYSTOLIC BLOOD PRESSURE: 124 MMHG | HEIGHT: 62 IN | WEIGHT: 141.98 LBS | BODY MASS INDEX: 26.13 KG/M2 | OXYGEN SATURATION: 93 % | HEART RATE: 108 BPM | DIASTOLIC BLOOD PRESSURE: 69 MMHG | RESPIRATION RATE: 14 BRPM

## 2025-05-30 DIAGNOSIS — C53.0 MALIGNANT NEOPLASM OF ENDOCERVIX (MULTI): ICD-10-CM

## 2025-05-30 DIAGNOSIS — C53.0 MALIGNANT NEOPLASM OF ENDOCERVIX (MULTI): Primary | ICD-10-CM

## 2025-05-30 PROBLEM — G89.18 POST-OPERATIVE PAIN: Status: ACTIVE | Noted: 2025-05-30

## 2025-05-30 LAB
RAD ONC MSQ ACTUAL FRACTIONS DELIVERED: 4
RAD ONC MSQ ACTUAL FRACTIONS DELIVERED: 5
RAD ONC MSQ ACTUAL SESSION DELIVERED DOSE: 600 CGRAY
RAD ONC MSQ ACTUAL SESSION DELIVERED DOSE: 600 CGRAY
RAD ONC MSQ ACTUAL TOTAL DOSE: 2400 CGRAY
RAD ONC MSQ ACTUAL TOTAL DOSE: 3000 CGRAY
RAD ONC MSQ ELAPSED DAYS: 2
RAD ONC MSQ ELAPSED DAYS: 2
RAD ONC MSQ LAST DATE: NORMAL
RAD ONC MSQ LAST DATE: NORMAL
RAD ONC MSQ PRESCRIBED FRACTIONAL DOSE: 600 CGRAY
RAD ONC MSQ PRESCRIBED FRACTIONAL DOSE: 600 CGRAY
RAD ONC MSQ PRESCRIBED NUMBER OF FRACTIONS: 5
RAD ONC MSQ PRESCRIBED NUMBER OF FRACTIONS: 5
RAD ONC MSQ PRESCRIBED TECHNIQUE: NORMAL
RAD ONC MSQ PRESCRIBED TECHNIQUE: NORMAL
RAD ONC MSQ PRESCRIBED TOTAL DOSE: 3000 CGRAY
RAD ONC MSQ PRESCRIBED TOTAL DOSE: 3000 CGRAY
RAD ONC MSQ PRESCRIPTION PATTERN COMMENT: NORMAL
RAD ONC MSQ PRESCRIPTION PATTERN COMMENT: NORMAL
RAD ONC MSQ START DATE: NORMAL
RAD ONC MSQ START DATE: NORMAL
RAD ONC MSQ TREATMENT COURSE NUMBER: 1
RAD ONC MSQ TREATMENT COURSE NUMBER: 1
RAD ONC MSQ TREATMENT SITE: NORMAL
RAD ONC MSQ TREATMENT SITE: NORMAL

## 2025-05-30 PROCEDURE — 2500000004 HC RX 250 GENERAL PHARMACY W/ HCPCS (ALT 636 FOR OP/ED): Performed by: STUDENT IN AN ORGANIZED HEALTH CARE EDUCATION/TRAINING PROGRAM

## 2025-05-30 PROCEDURE — 77290 THER RAD SIMULAJ FIELD CPLX: CPT | Performed by: RADIOLOGY

## 2025-05-30 PROCEDURE — 77772 HDR RDNCL NTRSTL/ICAV BRCHTX: CPT | Mod: XE | Performed by: RADIOLOGY

## 2025-05-30 PROCEDURE — 2500000004 HC RX 250 GENERAL PHARMACY W/ HCPCS (ALT 636 FOR OP/ED)

## 2025-05-30 PROCEDURE — 77772 HDR RDNCL NTRSTL/ICAV BRCHTX: CPT | Performed by: RADIOLOGY

## 2025-05-30 PROCEDURE — 99239 HOSP IP/OBS DSCHRG MGMT >30: CPT

## 2025-05-30 PROCEDURE — C1717 BRACHYTX, NON-STR,HDR IR-192: HCPCS | Mod: 76 | Performed by: RADIOLOGY

## 2025-05-30 PROCEDURE — 2500000002 HC RX 250 W HCPCS SELF ADMINISTERED DRUGS (ALT 637 FOR MEDICARE OP, ALT 636 FOR OP/ED): Performed by: STUDENT IN AN ORGANIZED HEALTH CARE EDUCATION/TRAINING PROGRAM

## 2025-05-30 PROCEDURE — 99231 SBSQ HOSP IP/OBS SF/LOW 25: CPT | Performed by: ANESTHESIOLOGY

## 2025-05-30 PROCEDURE — 77336 RADIATION PHYSICS CONSULT: CPT | Mod: 59 | Performed by: RADIOLOGY

## 2025-05-30 PROCEDURE — 77012 CT SCAN FOR NEEDLE BIOPSY: CPT | Performed by: RADIOLOGY

## 2025-05-30 PROCEDURE — C1717 BRACHYTX, NON-STR,HDR IR-192: HCPCS | Performed by: RADIOLOGY

## 2025-05-30 PROCEDURE — 2500000001 HC RX 250 WO HCPCS SELF ADMINISTERED DRUGS (ALT 637 FOR MEDICARE OP): Performed by: STUDENT IN AN ORGANIZED HEALTH CARE EDUCATION/TRAINING PROGRAM

## 2025-05-30 RX ADMIN — Medication: at 04:43

## 2025-05-30 RX ADMIN — LOPERAMIDE HYDROCHLORIDE 2 MG: 2 CAPSULE ORAL at 12:41

## 2025-05-30 RX ADMIN — DIPHENOXYLATE HYDROCHLORIDE AND ATROPINE SULFATE 2 TABLET: .025; 2.5 TABLET ORAL at 09:30

## 2025-05-30 RX ADMIN — LOPERAMIDE HYDROCHLORIDE 2 MG: 2 CAPSULE ORAL at 04:22

## 2025-05-30 RX ADMIN — HEPARIN SODIUM 5000 UNITS: 5000 INJECTION, SOLUTION INTRAVENOUS; SUBCUTANEOUS at 04:21

## 2025-05-30 ASSESSMENT — COGNITIVE AND FUNCTIONAL STATUS - GENERAL
DRESSING REGULAR UPPER BODY CLOTHING: A LITTLE
DAILY ACTIVITIY SCORE: 19
TURNING FROM BACK TO SIDE WHILE IN FLAT BAD: A LITTLE
HELP NEEDED FOR BATHING: A LITTLE
MOVING TO AND FROM BED TO CHAIR: A LITTLE
MOVING FROM LYING ON BACK TO SITTING ON SIDE OF FLAT BED WITH BEDRAILS: A LITTLE
WALKING IN HOSPITAL ROOM: A LITTLE
TOILETING: A LITTLE
PERSONAL GROOMING: A LITTLE
MOBILITY SCORE: 18
CLIMB 3 TO 5 STEPS WITH RAILING: A LITTLE
STANDING UP FROM CHAIR USING ARMS: A LITTLE
DRESSING REGULAR LOWER BODY CLOTHING: A LITTLE

## 2025-05-30 ASSESSMENT — PAIN - FUNCTIONAL ASSESSMENT
PAIN_FUNCTIONAL_ASSESSMENT: 0-10
PAIN_FUNCTIONAL_ASSESSMENT: UNABLE TO SELF-REPORT
PAIN_FUNCTIONAL_ASSESSMENT: 0-10

## 2025-05-30 ASSESSMENT — PAIN SCALES - GENERAL
PAINLEVEL_OUTOF10: 2
PAINLEVEL_OUTOF10: 7

## 2025-05-30 NOTE — PROGRESS NOTES
"Madalyn Manzano is a 39 y.o. female on day 1 of admission presenting with cervical SCC admitted for brachytherapy.    Subjective   Doing well with PCA for pain control. Reports no other problems at this time.      Objective     Physical Exam  Constitutional: No visible distress, alert and cooperative  Eyes: clear sclera  Head/Neck: Normocephalic  Respiratory/Thorax: Normal respiratory effort on RA, CTAB in anterior fields  Cardiovascular: RRR, no murmurs, rubs or gallops  Gastrointestinal: Abdomen soft, no tenderness to palpation, without rebound or guarding  Musculoskeletal: grossly normal ROM  Extremities: BLEs symmetrical, trace edema noted bilaterally  Neurological: A&Ox3  Psychological: Appropriate mood and behavior  Skin: warm, dry, no lesions      Last Recorded Vitals  Blood pressure 116/74, pulse 95, temperature 36.1 °C (97 °F), temperature source Temporal, resp. rate 16, height 1.575 m (5' 2\"), weight 64.4 kg (141 lb 15.6 oz), SpO2 92%.    Intake/Output last 3 Shifts:  I/O last 3 completed shifts:  In: 2910 (44.2 mL/kg) [P.O.:1610; I.V.:1300 (19.7 mL/kg)]  Out: 2060 (31.3 mL/kg) [Urine:2060 (0.9 mL/kg/hr)]  Weight: 65.9 kg     Relevant Results  Lab Results   Component Value Date    WBC 5.0 05/29/2025    HGB 8.6 (L) 05/29/2025    HCT 26.2 (L) 05/29/2025     (H) 05/29/2025     05/29/2025      Assessment & Plan  Cervical cancer, FIGO stage CORNELIA    39 y.o. female with stage CORNELIA cervical SCC presenting for brachytherapy admission (mac applicator).     Brachytherapy  - S/p successful MAC applicator placement on 5/28  - To complete total of 5 fx this admission (5/28 PM, 5/29 AM, 5/29 PM, 5/30 AM, 5/30 PM). S/p first treatment.  - Pain control with epidural and PCA acute pain team following. Home suboxone re-ordered.  - CLD, adjunctive/supportive medications ordered  - DVT prophylaxis: heparin TID (to be held after 8am on Friday 5/30), SCDs      Known Obstructive Uropathy  - Bilateral nephrostomy tubes " in place, draining clear urine  - Next PCNT exchange 6/2     Comorbidities  - Hepatitis C per chart review, not on treatment. Pt aware and is planning to establish treatment after radiation is over.  - Mood: home duloxetine/prazosin/ativan ordered  - Suboxone re-ordered given significant pain as above     Dispo: admit for brachytherapy treatment    Patient seen and discussed with Dr. Vee James MD PGY-1

## 2025-05-30 NOTE — PROGRESS NOTES
Patient Name: Madalyn Manzano  MRN: [id number]  : 1985  Age: 39 y.o.  Diagnosis:   1. Malignant neoplasm of endocervix (Multi)  Rad Onc CT Sim Images    Rad Onc Msq Treatment Summary    Rad Onc Msq Treatment Summary        Date: 25     GYN HDR Nursing Flowsheet    Fraction: 4  Time: 9:17 AM  Assessment: CT at 0730, pt wailda well.    0745 - Transported patient to treatment room after verifying patient name and birth date.  0750 - Dr. Perez in room and applicator connected to HDR unit without difficulty.  0805 - Treatment started without difficulty.  0816 - Treatment completed without difficulty.  0818 - Dr. Perez in room applicator/transfer cables removed without difficulty.  0905 - Discharged to floor in apparent stable condition.      Additional Notes: pt awilda treatment well.      By: Gema Clark RN      Electronically signed by Gema Clark RN on 25 at 9:17 AM

## 2025-05-30 NOTE — DISCHARGE SUMMARY
Discharge Diagnosis  Cervical cancer, FIGO stage CORNELIA    Issues Requiring Follow-Up  None    Test Results Pending At Discharge  Pending Labs       No current pending labs.            Hospital Course  Patient was admitted on 5/28 for scheduled brachytherapy treatment. She underwent 5 total fractions. She had the brachytherapy device placed and removed without complication. Please see rad onc documentation for further details. Pain was controlled with epidural and PCA. On day of discharge, she was able to tolerate a PO diet and medications, void, and ambulate.     Pertinent Physical Exam At Time of Discharge  See daily progress note    Home Medications     Medication List      CONTINUE taking these medications     buprenorphine-naloxone 2-0.5 mg per sublingual film; Commonly known as:   Suboxone   DULoxetine 30 mg DR capsule; Commonly known as: Cymbalta   HYDROcodone-acetaminophen 5-325 mg tablet; Commonly known as: Norco   LORazepam 1 mg tablet; Commonly known as: Ativan   multivitamin tablet   prazosin 1 mg capsule; Commonly known as: Minipress   tiZANidine 4 mg tablet; Commonly known as: Zanaflex     STOP taking these medications     polyethylene glycol 236-22.74-6.74 -5.86 gram solution; Commonly known   as: Golytely       Outpatient Follow-Up  Future Appointments   Date Time Provider Department Center   6/30/2025  1:30 PM [facility]   7/28/2025  1:30 PM [facility]   9/8/2025  1:30 PM [facility]       Roula James MD PGY-1

## 2025-05-30 NOTE — SIGNIFICANT EVENT
Patient's epidural catheter was removed uneventfully, and tip was intact. No bleeding at the site of removal.

## 2025-05-30 NOTE — CARE PLAN
Problem: Pain - Adult  Goal: Verbalizes/displays adequate comfort level or baseline comfort level  Outcome: Progressing     Problem: Safety - Adult  Goal: Free from fall injury  Outcome: Progressing     Problem: Discharge Planning  Goal: Discharge to home or other facility with appropriate resources  Outcome: Progressing     Problem: Chronic Conditions and Co-morbidities  Goal: Patient's chronic conditions and co-morbidity symptoms are monitored and maintained or improved  Outcome: Progressing     Problem: Nutrition  Goal: Nutrient intake appropriate for maintaining nutritional needs  Outcome: Progressing     Problem: Fall/Injury  Goal: Not fall by end of shift  Outcome: Progressing  Goal: Be free from injury by end of the shift  Outcome: Progressing  Goal: Verbalize understanding of personal risk factors for fall in the hospital  Outcome: Progressing  Goal: Verbalize understanding of risk factor reduction measures to prevent injury from fall in the home  Outcome: Progressing  Goal: Use assistive devices by end of the shift  Outcome: Progressing  Goal: Pace activities to prevent fatigue by end of the shift  Outcome: Progressing     Problem: Pain  Goal: Takes deep breaths with improved pain control throughout the shift  Outcome: Progressing  Goal: Turns in bed with improved pain control throughout the shift  Outcome: Progressing  Goal: Walks with improved pain control throughout the shift  Outcome: Progressing  Goal: Performs ADL's with improved pain control throughout shift  Outcome: Progressing  Goal: Participates in PT with improved pain control throughout the shift  Outcome: Progressing  Goal: Free from opioid side effects throughout the shift  Outcome: Progressing  Goal: Free from acute confusion related to pain meds throughout the shift  Outcome: Progressing     Problem: Skin  Goal: Decreased wound size/increased tissue granulation at next dressing change  Outcome: Progressing  Flowsheets (Taken 5/29/2025  2259)  Decreased wound size/increased tissue granulation at next dressing change: Promote sleep for wound healing  Goal: Participates in plan/prevention/treatment measures  Outcome: Progressing  Flowsheets (Taken 5/29/2025 2259)  Participates in plan/prevention/treatment measures: Elevate heels  Goal: Prevent/manage excess moisture  Outcome: Progressing  Flowsheets (Taken 5/29/2025 2259)  Prevent/manage excess moisture: Monitor for/manage infection if present  Goal: Prevent/minimize sheer/friction injuries  Outcome: Progressing  Flowsheets (Taken 5/29/2025 2259)  Prevent/minimize sheer/friction injuries:   Use pull sheet   HOB 30 degrees or less  Goal: Promote/optimize nutrition  Outcome: Progressing  Flowsheets (Taken 5/29/2025 2259)  Promote/optimize nutrition: Monitor/record intake including meals  Goal: Promote skin healing  Outcome: Progressing  Flowsheets (Taken 5/29/2025 2259)  Promote skin healing: Assess skin/pad under line(s)/device(s)

## 2025-05-30 NOTE — PROGRESS NOTES
Postop Pain HPI -   Palliative: relieved with IV analgesics and regional local anesthetics  Provocative: movement  Quality:  burning and aching  Radiation:  none  Severity:  5/10  Timing: constant    24-HOUR OPIOID CONSUMPTION:  Dilaudid PCA  Suboxone    Scheduled medications  Scheduled Medications[1]  Continuous medications  Continuous Medications[2]  PRN medications  PRN Medications[3]     Physical Exam:  Constitutional:  no distress, alert and cooperative  Eyes: clear sclera  Head/Neck: No apparent injury, trachea midline  Respiratory/Thorax: Patent airways, thorax symmetric, breathing comfortably  Cardiovascular: no pitting edema  Gastrointestinal: Nondistended  Musculoskeletal: ROM intact  Extremities: no clubbing  Neurological: alert, segovia x4  Psychological: Appropriate affect    Results for orders placed or performed during the hospital encounter of 05/30/25 (from the past 24 hours)   Rad Onc Msq Treatment Summary   Result Value Ref Range    Treatment Site Cervix     Course Number 1     Prescribed Fractional Dose 600 cGray    Prescribed Total Dose 3,000 cGray    Actual Fractions Delivered 4     Prescription Pattern Comment Empty bladder, MAC applicator     Actual Session Delivered Dose 600 cGray    Actual Total Dose 2,400 cGray    Prescribed Technique HDR Procedure     Elapsed Days 2     Start Date 5/28/2025     Last Date 5/30/2025     Prescribed Number of Fractions 5          Madalyn Manzano is a 39 y.o. year old female patient who presents for Bracytherapy with Dr. Perez on 05/28/25 . Acute Pain consulted for block for postoperative pain control.      Plan:     - Epidural placed by anesthesia team 05/28/25.   - c/w Epidural PCEA with Ropivacaine 0.2%/NaCl 0.9% 500mL, Rate 10 cc/hr   - Epidural medication will not interfere with pain medication prescribed by the primary team.   - Please be aware of local anesthetic toxic dose and absorption variability before considering lidocaine patches  - Acute pain  service will follow while epidural in place   - Rest of pain management per primary team     Acute Pain Resident  pg [phone] ph [phone]        [1] buprenorphine-naloxone, 1 Film, sublingual, Nightly  diphenoxylate-atropine, 2 tablet, oral, 4x daily  DULoxetine, 30 mg, oral, Nightly  [Held by provider] heparin (porcine), 5,000 Units, subcutaneous, q8h  loperamide, 2 mg, oral, q8h  prazosin, 1 mg, oral, Nightly  [2] HYDROmorphone,   [3] PRN medications: LORazepam, naloxone, simethicone

## 2025-05-30 NOTE — CARE PLAN
The patient's goals for the shift include Patient will remain free from fall and injury    The clinical goals for the shift include Pt will recieve adequate pain management      Problem: Pain - Adult  Goal: Verbalizes/displays adequate comfort level or baseline comfort level  Outcome: Progressing     Problem: Safety - Adult  Goal: Free from fall injury  Outcome: Progressing     Problem: Discharge Planning  Goal: Discharge to home or other facility with appropriate resources  Outcome: Progressing     Problem: Chronic Conditions and Co-morbidities  Goal: Patient's chronic conditions and co-morbidity symptoms are monitored and maintained or improved  Outcome: Progressing     Problem: Nutrition  Goal: Nutrient intake appropriate for maintaining nutritional needs  Outcome: Progressing     Problem: Fall/Injury  Goal: Not fall by end of shift  Outcome: Progressing  Goal: Be free from injury by end of the shift  Outcome: Progressing  Goal: Verbalize understanding of personal risk factors for fall in the hospital  Outcome: Progressing  Goal: Verbalize understanding of risk factor reduction measures to prevent injury from fall in the home  Outcome: Progressing  Goal: Use assistive devices by end of the shift  Outcome: Progressing  Goal: Pace activities to prevent fatigue by end of the shift  Outcome: Progressing     Problem: Pain  Goal: Takes deep breaths with improved pain control throughout the shift  Outcome: Progressing  Goal: Turns in bed with improved pain control throughout the shift  Outcome: Progressing  Goal: Walks with improved pain control throughout the shift  Outcome: Progressing  Goal: Performs ADL's with improved pain control throughout shift  Outcome: Progressing  Goal: Participates in PT with improved pain control throughout the shift  Outcome: Progressing  Goal: Free from opioid side effects throughout the shift  Outcome: Progressing  Goal: Free from acute confusion related to pain meds throughout the  shift  Outcome: Progressing     Problem: Skin  Goal: Decreased wound size/increased tissue granulation at next dressing change  Outcome: Progressing  Flowsheets (Taken 5/30/2025 1218)  Decreased wound size/increased tissue granulation at next dressing change: Promote sleep for wound healing  Goal: Participates in plan/prevention/treatment measures  Outcome: Progressing  Flowsheets (Taken 5/30/2025 1218)  Participates in plan/prevention/treatment measures: Elevate heels  Goal: Prevent/manage excess moisture  Outcome: Progressing  Flowsheets (Taken 5/30/2025 1218)  Prevent/manage excess moisture: Monitor for/manage infection if present  Goal: Prevent/minimize sheer/friction injuries  Outcome: Progressing  Flowsheets (Taken 5/30/2025 1218)  Prevent/minimize sheer/friction injuries:   HOB 30 degrees or less   Use pull sheet  Goal: Promote/optimize nutrition  Outcome: Progressing  Flowsheets (Taken 5/30/2025 1218)  Promote/optimize nutrition: Monitor/record intake including meals  Goal: Promote skin healing  Outcome: Progressing  Flowsheets (Taken 5/30/2025 1218)  Promote skin healing: Assess skin/pad under line(s)/device(s)

## 2025-06-02 ENCOUNTER — APPOINTMENT (OUTPATIENT)
Dept: RADIOLOGY | Facility: HOSPITAL | Age: 40
End: 2025-06-02
Payer: COMMERCIAL

## 2025-06-02 ENCOUNTER — APPOINTMENT (OUTPATIENT)
Dept: RADIATION ONCOLOGY | Facility: HOSPITAL | Age: 40
End: 2025-06-02
Payer: COMMERCIAL

## 2025-06-05 ENCOUNTER — HOSPITAL ENCOUNTER (OUTPATIENT)
Dept: RADIATION ONCOLOGY | Facility: HOSPITAL | Age: 40
Setting detail: RADIATION/ONCOLOGY SERIES
Discharge: HOME | End: 2025-06-05
Payer: COMMERCIAL

## 2025-06-16 ENCOUNTER — TELEMEDICINE (OUTPATIENT)
Dept: ONCOLOGY | Age: 40
End: 2025-06-16
Payer: COMMERCIAL

## 2025-06-16 ENCOUNTER — TELEPHONE (OUTPATIENT)
Dept: ONCOLOGY | Age: 40
End: 2025-06-16

## 2025-06-16 ENCOUNTER — TELEPHONE (OUTPATIENT)
Dept: GYNECOLOGIC ONCOLOGY | Age: 40
End: 2025-06-16

## 2025-06-16 DIAGNOSIS — C53.0 MALIGNANT NEOPLASM OF ENDOCERVIX (HCC): ICD-10-CM

## 2025-06-16 DIAGNOSIS — T45.1X5A ANEMIA ASSOCIATED WITH CHEMOTHERAPY: ICD-10-CM

## 2025-06-16 DIAGNOSIS — D64.81 ANEMIA ASSOCIATED WITH CHEMOTHERAPY: ICD-10-CM

## 2025-06-16 DIAGNOSIS — T45.1X5A MYELOSUPPRESSION AFTER CHEMOTHERAPY: ICD-10-CM

## 2025-06-16 DIAGNOSIS — D75.89 MYELOSUPPRESSION AFTER CHEMOTHERAPY: ICD-10-CM

## 2025-06-16 DIAGNOSIS — C77.5 METASTASIS TO ILIAC LYMPH NODE (HCC): Primary | ICD-10-CM

## 2025-06-16 PROCEDURE — 99211 OFF/OP EST MAY X REQ PHY/QHP: CPT | Performed by: INTERNAL MEDICINE

## 2025-06-16 PROCEDURE — 99214 OFFICE O/P EST MOD 30 MIN: CPT | Performed by: INTERNAL MEDICINE

## 2025-06-16 NOTE — PATIENT INSTRUCTIONS
Please contact G[facility] oncology at [facility] Dr. Rashid discuss any maintenance treatment/immunotherapy until seen by them in September and can call me on my cell phone  RTC in 4 weeks with labs

## 2025-06-16 NOTE — TELEPHONE ENCOUNTER
Spoke with Edith at Dr. Rashid office and informed that Dr. Estevez would like to discuss case with her.  Looking to determine if some maintenance therapy should be completed prior to her seeing them in September.    Edith will provide Dr. Rashid with Dr. Estevez cell phone #.

## 2025-06-16 NOTE — TELEPHONE ENCOUNTER
Called to schedule patient for post chemo-radiation surveillance visit.  Patient states she wasn't aware of needing any surgery and that Dr. Estevez was going to speak with Dr. Joshua.  Writer explained that our role in her care is not necessarily surgery, but to perform surveillance visits / pelvic exams to ensure health during and after treatment.  Patient states Dr. Bonilla performs those exams at every visit.  Writer encouraged patient to speak with team and call office to schedule when comfortable.  Patient voiced understanding and appreciation.

## 2025-06-16 NOTE — PROGRESS NOTES
access as needed. 30 g 1    HYDROcodone-acetaminophen (NORCO) 5-325 MG per tablet TAKE 1 TABLET BY MOUTH EVERY 4 HOURS AS NEEDED FOR PAIN FOR 14 DAYS (Patient not taking: Reported on 6/16/2025)       No current facility-administered medications for this visit.       Social History     Socioeconomic History    Marital status: Single     Spouse name: Not on file    Number of children: Not on file    Years of education: Not on file    Highest education level: Not on file   Occupational History    Not on file   Tobacco Use    Smoking status: Every Day     Current packs/day: 0.50     Average packs/day: 0.5 packs/day for 19.0 years (9.5 ttl pk-yrs)     Types: Cigarettes     Start date: 6/12/2006     Passive exposure: Current    Smokeless tobacco: Never   Vaping Use    Vaping status: Every Day    Substances: Always   Substance and Sexual Activity    Alcohol use: Not Currently     Alcohol/week: 10.0 standard drinks of alcohol     Types: 10 Cans of beer per week    Drug use: Not Currently     Types: Cocaine     Comment: Last use is 2013    Sexual activity: Not Currently   Other Topics Concern    Not on file   Social History Narrative    Not on file     Social Drivers of Health     Financial Resource Strain: Low Risk  (5/28/2025)    Received from [facility]    Overall Financial Resource Strain (CARDIA)     Difficulty of Paying Living Expenses: Not hard at all   Food Insecurity: No Food Insecurity (5/28/2025)    Received from [facility]    Hunger Vital Sign     Worried About Running Out of Food in the Last Year: Never true     Ran Out of Food in the Last Year: Never true   Transportation Needs: No Transportation Needs (5/28/2025)    Received from [facility]    PRAPARE - Transportation     Lack of Transportation (Medical): No     Lack of Transportation (Non-Medical): No   Physical Activity: Not on file   Stress: Not on file   Social Connections: Not on file

## 2025-06-17 ENCOUNTER — TELEPHONE (OUTPATIENT)
Dept: ONCOLOGY | Age: 40
End: 2025-06-17

## 2025-06-17 NOTE — TELEPHONE ENCOUNTER
Received call from Dr. Rashid as when she called Dr. Estevez his phone went to [employer].  She states that she would not start any keytruda or avastin at this time as patient had definitive treatment already with carbo/taxol and cisplat radiation etc.    She would like to hold any Keytruda or Avastin as an option in the future should something she have recurring disease.  At this time no treatment until f/u with scans/September visit there.

## 2025-06-17 NOTE — TELEPHONE ENCOUNTER
Called today and spoke with Tania per Dr. Estevez request.  He did not hear back from Dr. Rashid yesterday.  I explained to Tania that patient had induction chemotherapy with carbo/taxol Jan - Feb 2025.  This was followed by concurrent radiation with cisplatin from 3/25 into end of April 25.  She is now also post brachytherapy.    The plan is scans in 3-4 months after brachy therapy complete.  Dr. Estevez is asking if patient needs any maintenance therapy/immunotherapy at this time until she is seen again by Dr. Rashid in September.    Provided my number as well as Dr. Estevez cell phone number to answer any questions that Dr. Rashid may have.

## 2025-06-27 ENCOUNTER — TELEPHONE (OUTPATIENT)
Dept: RADIATION ONCOLOGY | Facility: HOSPITAL | Age: 40
End: 2025-06-27
Payer: COMMERCIAL

## 2025-06-30 ENCOUNTER — HOSPITAL ENCOUNTER (OUTPATIENT)
Dept: RADIATION ONCOLOGY | Facility: HOSPITAL | Age: 40
Setting detail: RADIATION/ONCOLOGY SERIES
Discharge: HOME | End: 2025-06-30
Payer: COMMERCIAL

## 2025-06-30 VITALS
RESPIRATION RATE: 18 BRPM | BODY MASS INDEX: 25.16 KG/M2 | DIASTOLIC BLOOD PRESSURE: 79 MMHG | OXYGEN SATURATION: 100 % | SYSTOLIC BLOOD PRESSURE: 124 MMHG | TEMPERATURE: 97.2 F | WEIGHT: 137.57 LBS | HEART RATE: 67 BPM

## 2025-06-30 DIAGNOSIS — C53.9 CERVICAL CANCER, FIGO STAGE IVA: Primary | ICD-10-CM

## 2025-06-30 PROCEDURE — 99215 OFFICE O/P EST HI 40 MIN: CPT | Performed by: NURSE PRACTITIONER

## 2025-06-30 PROCEDURE — 99214 OFFICE O/P EST MOD 30 MIN: CPT | Performed by: NURSE PRACTITIONER

## 2025-06-30 ASSESSMENT — ENCOUNTER SYMPTOMS
OCCASIONAL FEELINGS OF UNSTEADINESS: 0
LOSS OF SENSATION IN FEET: 0
DEPRESSION: 1
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
## 2025-07-01 ASSESSMENT — ENCOUNTER SYMPTOMS
ACTIVITY CHANGE: 0
DYSURIA: 1
CHILLS: 0
NEUROLOGICAL NEGATIVE: 1
DIAPHORESIS: 0
MUSCULOSKELETAL NEGATIVE: 1
FREQUENCY: 0
DIARRHEA: 0
CARDIOVASCULAR NEGATIVE: 1
VOMITING: 0
RESPIRATORY NEGATIVE: 1
HEMATURIA: 0
NAUSEA: 0
APPETITE CHANGE: 0
PSYCHIATRIC NEGATIVE: 1
FEVER: 0
UNEXPECTED WEIGHT CHANGE: 0
ANAL BLEEDING: 0
HEMATOLOGIC/LYMPHATIC NEGATIVE: 1
DIFFICULTY URINATING: 0
BLOOD IN STOOL: 0
ABDOMINAL PAIN: 0
FLANK PAIN: 0
RECTAL PAIN: 0
CONSTIPATION: 0
ABDOMINAL DISTENTION: 0
FATIGUE: 0

## 2025-07-10 ENCOUNTER — HOSPITAL ENCOUNTER (OUTPATIENT)
Age: 40
Discharge: HOME | End: 2025-07-10
Payer: COMMERCIAL

## 2025-07-10 DIAGNOSIS — C53.0 MALIGNANT NEOPLASM OF ENDOCERVIX (HCC): ICD-10-CM

## 2025-07-10 LAB
ALBUMIN SERPL-MCNC: 3.8 G/DL (ref 3.5–5.2)
ALBUMIN/GLOB SERPL: 1.3 {RATIO} (ref 1–2.5)
ALP SERPL-CCNC: 89 U/L (ref 35–104)
ALT SERPL-CCNC: 38 U/L (ref 5–33)
ANION GAP SERPL CALCULATED.3IONS-SCNC: 12 MMOL/L (ref 9–17)
AST SERPL-CCNC: 43 U/L
BASOPHILS # BLD: 0.01 K/UL (ref 0–0.2)
BASOPHILS NFR BLD: 0 % (ref 0–2)
BILIRUB SERPL-MCNC: 0.3 MG/DL (ref 0.3–1.2)
BUN SERPL-MCNC: 6 MG/DL (ref 6–20)
CALCIUM SERPL-MCNC: 9.5 MG/DL (ref 8.6–10.4)
CHLORIDE SERPL-SCNC: 104 MMOL/L (ref 98–107)
CO2 SERPL-SCNC: 23 MMOL/L (ref 20–31)
CREAT SERPL-MCNC: 0.7 MG/DL (ref 0.5–0.9)
EOSINOPHIL # BLD: 0.14 K/UL (ref 0–0.4)
EOSINOPHILS RELATIVE PERCENT: 4 % (ref 0–5)
ERYTHROCYTE [DISTWIDTH] IN BLOOD BY AUTOMATED COUNT: 16.8 % (ref 12.1–15.2)
GFR, ESTIMATED: >90 ML/MIN/1.73M2
GLUCOSE SERPL-MCNC: 108 MG/DL (ref 70–99)
HCT VFR BLD AUTO: 36.3 % (ref 36–46)
HGB BLD-MCNC: 12.6 G/DL (ref 12–16)
IMM GRANULOCYTES # BLD AUTO: 0 K/UL (ref 0–0.3)
IMM GRANULOCYTES NFR BLD: 0 % (ref 0–5)
LYMPHOCYTES NFR BLD: 0.71 K/UL (ref 1–4.8)
LYMPHOCYTES RELATIVE PERCENT: 19 % (ref 15–40)
MCH RBC QN AUTO: 38.8 PG (ref 26–34)
MCHC RBC AUTO-ENTMCNC: 34.7 G/DL (ref 31–37)
MCV RBC AUTO: 111.7 FL (ref 80–100)
MONOCYTES NFR BLD: 0.35 K/UL (ref 0–1)
MONOCYTES NFR BLD: 9 % (ref 4–8)
MORPHOLOGY: ABNORMAL
NEUTROPHILS NFR BLD: 68 % (ref 47–75)
NEUTS SEG NFR BLD: 2.57 K/UL (ref 2.5–7)
PLATELET # BLD AUTO: 64 K/UL (ref 140–450)
PMV BLD AUTO: 10.7 FL (ref 6–12)
POTASSIUM SERPL-SCNC: 4.1 MMOL/L (ref 3.7–5.3)
PROT SERPL-MCNC: 6.7 G/DL (ref 6.4–8.3)
RBC # BLD AUTO: 3.25 M/UL (ref 4–5.2)
SODIUM SERPL-SCNC: 139 MMOL/L (ref 135–144)
WBC OTHER # BLD: 3.8 K/UL (ref 3.5–11)

## 2025-07-10 PROCEDURE — 36415 COLL VENOUS BLD VENIPUNCTURE: CPT

## 2025-07-10 PROCEDURE — 80053 COMPREHEN METABOLIC PANEL: CPT

## 2025-07-10 PROCEDURE — 85025 COMPLETE CBC W/AUTO DIFF WBC: CPT

## 2025-07-14 ENCOUNTER — OFFICE VISIT (OUTPATIENT)
Dept: ONCOLOGY | Age: 40
End: 2025-07-14
Payer: COMMERCIAL

## 2025-07-14 ENCOUNTER — HOSPITAL ENCOUNTER (OUTPATIENT)
Dept: INFUSION THERAPY | Age: 40
Discharge: HOME | End: 2025-07-14
Payer: COMMERCIAL

## 2025-07-14 ENCOUNTER — HOSPITAL ENCOUNTER (OUTPATIENT)
Age: 40
Setting detail: SPECIMEN
Discharge: HOME | End: 2025-07-14
Payer: COMMERCIAL

## 2025-07-14 ENCOUNTER — TELEPHONE (OUTPATIENT)
Dept: ONCOLOGY | Age: 40
End: 2025-07-14

## 2025-07-14 VITALS
TEMPERATURE: 98.2 F | WEIGHT: 141 LBS | SYSTOLIC BLOOD PRESSURE: 108 MMHG | RESPIRATION RATE: 18 BRPM | BODY MASS INDEX: 25.95 KG/M2 | HEART RATE: 100 BPM | DIASTOLIC BLOOD PRESSURE: 65 MMHG | HEIGHT: 62 IN

## 2025-07-14 DIAGNOSIS — D75.89 MACROCYTOSIS: ICD-10-CM

## 2025-07-14 DIAGNOSIS — T45.1X5A MYELOSUPPRESSION AFTER CHEMOTHERAPY: ICD-10-CM

## 2025-07-14 DIAGNOSIS — D75.89 MYELOSUPPRESSION AFTER CHEMOTHERAPY: ICD-10-CM

## 2025-07-14 DIAGNOSIS — C77.5 METASTASIS TO ILIAC LYMPH NODE (HCC): Primary | ICD-10-CM

## 2025-07-14 DIAGNOSIS — C53.0 MALIGNANT NEOPLASM OF ENDOCERVIX (HCC): Primary | ICD-10-CM

## 2025-07-14 DIAGNOSIS — D69.6 ACQUIRED THROMBOCYTOPENIA: ICD-10-CM

## 2025-07-14 DIAGNOSIS — C53.0 MALIGNANT NEOPLASM OF ENDOCERVIX (HCC): ICD-10-CM

## 2025-07-14 LAB
BASOPHILS # BLD: 0.03 K/UL (ref 0–0.2)
BASOPHILS NFR BLD: 1 % (ref 0–2)
EOSINOPHIL # BLD: 0.12 K/UL (ref 0–0.44)
EOSINOPHILS RELATIVE PERCENT: 4 % (ref 1–4)
ERYTHROCYTE [DISTWIDTH] IN BLOOD BY AUTOMATED COUNT: 16.9 % (ref 11.8–14.4)
FOLATE SERPL-MCNC: 5.9 NG/ML (ref 4.8–24.2)
HCT VFR BLD AUTO: 36.3 % (ref 36.3–47.1)
HGB BLD-MCNC: 12.9 G/DL (ref 11.9–15.1)
IMM GRANULOCYTES # BLD AUTO: 0 K/UL (ref 0–0.3)
IMM GRANULOCYTES NFR BLD: 0 %
IMM RETICS NFR: 15.6 % (ref 2.7–18.3)
LYMPHOCYTES NFR BLD: 0.81 K/UL (ref 1.1–3.7)
LYMPHOCYTES RELATIVE PERCENT: 28 % (ref 24–43)
MCH RBC QN AUTO: 39.4 PG (ref 25.2–33.5)
MCHC RBC AUTO-ENTMCNC: 35.5 G/DL (ref 28.4–34.8)
MCV RBC AUTO: 111 FL (ref 82.6–102.9)
MONOCYTES NFR BLD: 0.32 K/UL (ref 0.1–1.2)
MONOCYTES NFR BLD: 11 % (ref 3–12)
MORPHOLOGY: ABNORMAL
NEUTROPHILS NFR BLD: 56 % (ref 36–65)
NEUTS SEG NFR BLD: 1.62 K/UL (ref 1.5–8.1)
NRBC BLD-RTO: 0 PER 100 WBC
PLATELET # BLD AUTO: ABNORMAL K/UL (ref 138–453)
PLATELET, FLUORESCENCE: 86 K/UL (ref 138–453)
PLATELETS.RETICULATED NFR BLD AUTO: 5 % (ref 1.1–10.3)
RBC # BLD AUTO: 3.27 M/UL (ref 3.95–5.11)
RETIC HEMOGLOBIN: 42.1 PG (ref 28.2–35.7)
RETICS # AUTO: 0.07 M/UL (ref 0.03–0.08)
RETICS/RBC NFR AUTO: 2.1 % (ref 0.5–1.9)
VIT B12 SERPL-MCNC: 356 PG/ML (ref 232–1245)
WBC OTHER # BLD: 2.9 K/UL (ref 3.5–11.3)

## 2025-07-14 PROCEDURE — 2500000003 HC RX 250 WO HCPCS: Performed by: INTERNAL MEDICINE

## 2025-07-14 PROCEDURE — 6360000002 HC RX W HCPCS: Performed by: INTERNAL MEDICINE

## 2025-07-14 PROCEDURE — 99214 OFFICE O/P EST MOD 30 MIN: CPT | Performed by: INTERNAL MEDICINE

## 2025-07-14 PROCEDURE — 82746 ASSAY OF FOLIC ACID SERUM: CPT

## 2025-07-14 PROCEDURE — 36591 DRAW BLOOD OFF VENOUS DEVICE: CPT

## 2025-07-14 PROCEDURE — 82607 VITAMIN B-12: CPT

## 2025-07-14 PROCEDURE — 85045 AUTOMATED RETICULOCYTE COUNT: CPT

## 2025-07-14 PROCEDURE — 85025 COMPLETE CBC W/AUTO DIFF WBC: CPT

## 2025-07-14 PROCEDURE — 36415 COLL VENOUS BLD VENIPUNCTURE: CPT

## 2025-07-14 RX ORDER — ONDANSETRON 2 MG/ML
8 INJECTION INTRAMUSCULAR; INTRAVENOUS
OUTPATIENT
Start: 2025-07-14

## 2025-07-14 RX ORDER — HEPARIN 100 UNIT/ML
500 SYRINGE INTRAVENOUS PRN
Status: DISCONTINUED | OUTPATIENT
Start: 2025-07-14 | End: 2025-07-15 | Stop reason: HOSPADM

## 2025-07-14 RX ORDER — SODIUM CHLORIDE 9 MG/ML
25 INJECTION, SOLUTION INTRAVENOUS PRN
OUTPATIENT
Start: 2025-07-14

## 2025-07-14 RX ORDER — ALBUTEROL SULFATE 90 UG/1
4 INHALANT RESPIRATORY (INHALATION) PRN
OUTPATIENT
Start: 2025-07-14

## 2025-07-14 RX ORDER — FAMOTIDINE 10 MG/ML
20 INJECTION, SOLUTION INTRAVENOUS
OUTPATIENT
Start: 2025-07-14

## 2025-07-14 RX ORDER — SODIUM CHLORIDE 9 MG/ML
INJECTION, SOLUTION INTRAVENOUS CONTINUOUS
OUTPATIENT
Start: 2025-07-14

## 2025-07-14 RX ORDER — HEPARIN 100 UNIT/ML
500 SYRINGE INTRAVENOUS PRN
OUTPATIENT
Start: 2025-07-14

## 2025-07-14 RX ORDER — ACETAMINOPHEN 325 MG/1
650 TABLET ORAL
OUTPATIENT
Start: 2025-07-14

## 2025-07-14 RX ORDER — SODIUM CHLORIDE 0.9 % (FLUSH) 0.9 %
5-40 SYRINGE (ML) INJECTION PRN
Status: DISCONTINUED | OUTPATIENT
Start: 2025-07-14 | End: 2025-07-15 | Stop reason: HOSPADM

## 2025-07-14 RX ORDER — EPINEPHRINE 1 MG/ML
0.3 INJECTION, SOLUTION, CONCENTRATE INTRAVENOUS PRN
OUTPATIENT
Start: 2025-07-14

## 2025-07-14 RX ORDER — SODIUM CHLORIDE 0.9 % (FLUSH) 0.9 %
5-40 SYRINGE (ML) INJECTION PRN
OUTPATIENT
Start: 2025-07-14

## 2025-07-14 RX ORDER — HYDROCORTISONE SODIUM SUCCINATE 100 MG/2ML
100 INJECTION INTRAMUSCULAR; INTRAVENOUS
OUTPATIENT
Start: 2025-07-14

## 2025-07-14 RX ORDER — DIPHENHYDRAMINE HYDROCHLORIDE 50 MG/ML
50 INJECTION, SOLUTION INTRAMUSCULAR; INTRAVENOUS
OUTPATIENT
Start: 2025-07-14

## 2025-07-14 RX ADMIN — HEPARIN 500 UNITS: 100 SYRINGE at 11:21

## 2025-07-14 RX ADMIN — SODIUM CHLORIDE, PRESERVATIVE FREE 10 ML: 5 INJECTION INTRAVENOUS at 11:20

## 2025-07-14 RX ADMIN — SODIUM CHLORIDE, PRESERVATIVE FREE 10 ML: 5 INJECTION INTRAVENOUS at 11:21

## 2025-07-14 NOTE — PROGRESS NOTES
Patient ID: Nola Llanes, 1985, [id number], 39 y.o.  Referred by :  Lucy Estevez MD   Reason for consultation: cervical cancer,         Oncology history  Locally advanced cervical cancer with right external iliac lymphadenopathy      Oncology treatment  Induction chemotherapy CarboTaxol weekly for 6-week started January 16, 2025 and done on February 20/27 2025  concurrent chemo RT after induction chemotherapy started March 25/2025 and done on 4/28  Chemotherapy induced bone marrow suppression and required dose modification  Posttreatment MRI on April 28, 2025> significant improvement  Status post brachytherapy May 28, 2025      HISTORY OF PRESENT ILLNESS:    Oncologic History:    Nola Llanes is a very pleasant 39 y.o. female.  Who was seen initially by GYN for NATHAN-3 and found to have advanced cervical cancer with fistula with obstructive uropathy status post bilateral nephrostomy tube placement at this time MRI PET/CT pending she was seen by GYN oncology and plan to proceed with concurrent chemo RT   Pathology as below      11/26/24 0000    Surgical Pathology Report Path Number: [id number]    -- Diagnosis --  CERVIX, BIOPSY:  - HIGH-GRADE SQUAMOUS INTRAEPITHELIAL LESION (NATHAN 3).  SEE COMMENT.   COMMENT: IMMUNOSTAIN FOR p16 (CONTROL APPROPRIATE) DEMONSTRATES A   POSITIVE STAINING PATTERN IN THE SQUAMOUS EPITHELIUM CONSISTENT WITH A   HIGH-GRADE SQUAMOUS INTRAEPITHELIAL LESION (NATHAN 3).  FOCAL INVOLVEMENT   OF ENDOCERVICAL EPITHELIUM BY THE HIGH-GRADE INTRAEPITHELIAL LESION IS   PRESENT.  MILD CHRONIC CERVICITIS IS PRESENT.  THERE IS NO EVIDENCE OF   INVASIVE NEOPLASIA IN THE BIOPSY.     Patient's met with radiation oncology and plan to start concurrent chemo RT however there was discussion about a neoadjuvant chemotherapy  PET/CT was done and reviewed and there is right external iliac lymph node suspicious for local metastatic disease  Discussed case with radiation oncology and Dr. Joshua

## 2025-07-14 NOTE — TELEPHONE ENCOUNTER
Added the patient to the tx side for a Port Draw  and no lables printed out to scan and checked into the [employer] to see if patient entered into there and unable to find the patient. This has been ongoing issue, will contact IT to find out.

## 2025-07-15 LAB
PATH REV BLD -IMP: NORMAL
SURGICAL PATHOLOGY REPORT: NORMAL

## 2025-07-17 ENCOUNTER — TELEPHONE (OUTPATIENT)
Dept: ONCOLOGY | Age: 40
End: 2025-07-17

## 2025-07-17 NOTE — TELEPHONE ENCOUNTER
Reached out to the patient to le there know about her port draw apt. On 9/11/2025 at 1030 and then apt. With Dr. Estevez to follow at 11:00. She agreed that she would be here.

## 2025-07-23 ENCOUNTER — HOSPITAL ENCOUNTER (OUTPATIENT)
Dept: INTERVENTIONAL RADIOLOGY/VASCULAR | Age: 40
Discharge: HOME | End: 2025-07-25
Payer: COMMERCIAL

## 2025-07-23 VITALS
SYSTOLIC BLOOD PRESSURE: 105 MMHG | HEART RATE: 78 BPM | OXYGEN SATURATION: 97 % | TEMPERATURE: 96.6 F | DIASTOLIC BLOOD PRESSURE: 53 MMHG | RESPIRATION RATE: 16 BRPM

## 2025-07-23 DIAGNOSIS — C53.0 MALIGNANT NEOPLASM OF ENDOCERVIX (HCC): ICD-10-CM

## 2025-07-23 DIAGNOSIS — C77.5 METASTASIS TO ILIAC LYMPH NODE (HCC): ICD-10-CM

## 2025-07-23 PROCEDURE — 7100000011 HC PHASE II RECOVERY - ADDTL 15 MIN

## 2025-07-23 PROCEDURE — 6360000002 HC RX W HCPCS

## 2025-07-23 PROCEDURE — 50435 EXCHANGE NEPHROSTOMY CATH: CPT

## 2025-07-23 PROCEDURE — 7100000010 HC PHASE II RECOVERY - FIRST 15 MIN

## 2025-07-23 PROCEDURE — 6360000004 HC RX CONTRAST MEDICATION: Performed by: RADIOLOGY

## 2025-07-23 PROCEDURE — C1729 CATH, DRAINAGE: HCPCS

## 2025-07-23 PROCEDURE — C1769 GUIDE WIRE: HCPCS

## 2025-07-23 PROCEDURE — 6360000002 HC RX W HCPCS: Performed by: RADIOLOGY

## 2025-07-23 PROCEDURE — 2709999900 HC NON-CHARGEABLE SUPPLY

## 2025-07-23 RX ORDER — HEPARIN 100 UNIT/ML
500 SYRINGE INTRAVENOUS ONCE
Status: COMPLETED | OUTPATIENT
Start: 2025-07-23 | End: 2025-07-23

## 2025-07-23 RX ORDER — IOPAMIDOL 755 MG/ML
INJECTION, SOLUTION INTRAVASCULAR PRN
Status: COMPLETED | OUTPATIENT
Start: 2025-07-23 | End: 2025-07-23

## 2025-07-23 RX ORDER — BUPIVACAINE HYDROCHLORIDE 5 MG/ML
INJECTION, SOLUTION EPIDURAL; INTRACAUDAL; PERINEURAL PRN
Status: COMPLETED | OUTPATIENT
Start: 2025-07-23 | End: 2025-07-23

## 2025-07-23 RX ORDER — LIDOCAINE HYDROCHLORIDE 10 MG/ML
INJECTION, SOLUTION EPIDURAL; INFILTRATION; INTRACAUDAL; PERINEURAL PRN
Status: COMPLETED | OUTPATIENT
Start: 2025-07-23 | End: 2025-07-23

## 2025-07-23 RX ADMIN — BUPIVACAINE HYDROCHLORIDE 8 ML: 5 INJECTION, SOLUTION EPIDURAL; INTRACAUDAL at 11:02

## 2025-07-23 RX ADMIN — LIDOCAINE HYDROCHLORIDE 4 ML: 10 INJECTION, SOLUTION EPIDURAL; INFILTRATION; INTRACAUDAL; PERINEURAL at 10:54

## 2025-07-23 RX ADMIN — Medication 500 UNITS: at 12:00

## 2025-07-23 RX ADMIN — LIDOCAINE HYDROCHLORIDE 6 ML: 10 INJECTION, SOLUTION EPIDURAL; INFILTRATION; INTRACAUDAL; PERINEURAL at 11:01

## 2025-07-23 RX ADMIN — FENTANYL CITRATE 50 MCG: 50 INJECTION INTRAMUSCULAR; INTRAVENOUS at 10:55

## 2025-07-23 RX ADMIN — IOPAMIDOL 20 ML: 755 INJECTION, SOLUTION INTRAVENOUS at 11:12

## 2025-07-23 RX ADMIN — LIDOCAINE HYDROCHLORIDE 2 ML: 10 INJECTION, SOLUTION EPIDURAL; INFILTRATION; INTRACAUDAL; PERINEURAL at 10:52

## 2025-07-23 NOTE — DISCHARGE INSTRUCTIONS
Change dressings to Nephrostomy tubes on 7/30/25    Next tube exchange is Sept. 10th at 10:00am - please arrive at 9:00am

## 2025-07-23 NOTE — PROGRESS NOTES
Discharge instructions given, all questions answered. Pt dressed, belongings in hand, left department independently. Mother waiting in main lobby to transport pt home. Pt aware of next procedure appointment and to change neph tube dressings in 7 days. Transparent dressings with CHG patch provided.

## 2025-07-28 ENCOUNTER — HOSPITAL ENCOUNTER (OUTPATIENT)
Dept: RADIOLOGY | Facility: HOSPITAL | Age: 40
Discharge: HOME | End: 2025-07-28
Payer: COMMERCIAL

## 2025-07-28 ENCOUNTER — HOSPITAL ENCOUNTER (OUTPATIENT)
Dept: RADIATION ONCOLOGY | Facility: HOSPITAL | Age: 40
Setting detail: RADIATION/ONCOLOGY SERIES
Discharge: HOME | End: 2025-07-28
Payer: COMMERCIAL

## 2025-07-28 ENCOUNTER — LAB (OUTPATIENT)
Dept: LAB | Facility: HOSPITAL | Age: 40
End: 2025-07-28
Payer: COMMERCIAL

## 2025-07-28 VITALS
TEMPERATURE: 96.8 F | HEART RATE: 90 BPM | OXYGEN SATURATION: 100 % | SYSTOLIC BLOOD PRESSURE: 106 MMHG | BODY MASS INDEX: 26.29 KG/M2 | WEIGHT: 143.74 LBS | DIASTOLIC BLOOD PRESSURE: 68 MMHG | RESPIRATION RATE: 18 BRPM

## 2025-07-28 DIAGNOSIS — C53.9 MALIGNANT NEOPLASM OF CERVIX, UNSPECIFIED SITE (MULTI): ICD-10-CM

## 2025-07-28 DIAGNOSIS — C53.9 CERVICAL CANCER, FIGO STAGE IVA: Primary | ICD-10-CM

## 2025-07-28 DIAGNOSIS — R78.81 BACTEREMIA DUE TO OTHER BACTERIA: ICD-10-CM

## 2025-07-28 DIAGNOSIS — B96.89 BACTEREMIA DUE TO OTHER BACTERIA: ICD-10-CM

## 2025-07-28 DIAGNOSIS — C53.9 CERVICAL CANCER, FIGO STAGE IVA: ICD-10-CM

## 2025-07-28 LAB
ANION GAP SERPL CALC-SCNC: 15 MMOL/L (ref 10–20)
APPEARANCE UR: ABNORMAL
BACTERIA #/AREA URNS AUTO: ABNORMAL /HPF
BASOPHILS # BLD AUTO: 0.03 X10*3/UL (ref 0–0.1)
BASOPHILS NFR BLD AUTO: 0.2 %
BILIRUB UR STRIP.AUTO-MCNC: NEGATIVE MG/DL
BUN SERPL-MCNC: 15 MG/DL (ref 6–23)
CALCIUM SERPL-MCNC: 9.4 MG/DL (ref 8.6–10.3)
CHLORIDE SERPL-SCNC: 97 MMOL/L (ref 98–107)
CO2 SERPL-SCNC: 26 MMOL/L (ref 21–32)
COLOR UR: YELLOW
CREAT SERPL-MCNC: 1.49 MG/DL (ref 0.5–1.05)
EGFRCR SERPLBLD CKD-EPI 2021: 45 ML/MIN/1.73M*2
EOSINOPHIL # BLD AUTO: 0.1 X10*3/UL (ref 0–0.7)
EOSINOPHIL NFR BLD AUTO: 0.7 %
ERYTHROCYTE [DISTWIDTH] IN BLOOD BY AUTOMATED COUNT: 15.6 % (ref 11.5–14.5)
GLUCOSE SERPL-MCNC: 90 MG/DL (ref 74–99)
GLUCOSE UR STRIP.AUTO-MCNC: NORMAL MG/DL
HCT VFR BLD AUTO: 33.4 % (ref 36–46)
HGB BLD-MCNC: 11.9 G/DL (ref 12–16)
IMM GRANULOCYTES # BLD AUTO: 0.22 X10*3/UL (ref 0–0.7)
IMM GRANULOCYTES NFR BLD AUTO: 1.5 % (ref 0–0.9)
KETONES UR STRIP.AUTO-MCNC: NEGATIVE MG/DL
LEUKOCYTE ESTERASE UR QL STRIP.AUTO: ABNORMAL
LYMPHOCYTES # BLD AUTO: 0.64 X10*3/UL (ref 1.2–4.8)
LYMPHOCYTES NFR BLD AUTO: 4.3 %
MAGNESIUM SERPL-MCNC: 1.49 MG/DL (ref 1.6–2.4)
MCH RBC QN AUTO: 40.3 PG (ref 26–34)
MCHC RBC AUTO-ENTMCNC: 35.6 G/DL (ref 32–36)
MCV RBC AUTO: 113 FL (ref 80–100)
MONOCYTES # BLD AUTO: 1.42 X10*3/UL (ref 0.1–1)
MONOCYTES NFR BLD AUTO: 9.5 %
MUCOUS THREADS #/AREA URNS AUTO: ABNORMAL /LPF
NEUTROPHILS # BLD AUTO: 12.58 X10*3/UL (ref 1.2–7.7)
NEUTROPHILS NFR BLD AUTO: 83.8 %
NITRITE UR QL STRIP.AUTO: NEGATIVE
NRBC BLD-RTO: 0 /100 WBCS (ref 0–0)
PH UR STRIP.AUTO: 5.5 [PH]
PHOSPHATE SERPL-MCNC: 3.1 MG/DL (ref 2.5–4.9)
PLATELET # BLD AUTO: 106 X10*3/UL (ref 150–450)
POTASSIUM SERPL-SCNC: 3.6 MMOL/L (ref 3.5–5.3)
PROT UR STRIP.AUTO-MCNC: ABNORMAL MG/DL
RBC # BLD AUTO: 2.95 X10*6/UL (ref 4–5.2)
RBC # UR STRIP.AUTO: ABNORMAL MG/DL
RBC #/AREA URNS AUTO: ABNORMAL /HPF
SODIUM SERPL-SCNC: 134 MMOL/L (ref 136–145)
SP GR UR STRIP.AUTO: 1.02
SQUAMOUS #/AREA URNS AUTO: ABNORMAL /HPF
UROBILINOGEN UR STRIP.AUTO-MCNC: ABNORMAL MG/DL
WBC # BLD AUTO: 15 X10*3/UL (ref 4.4–11.3)
WBC #/AREA URNS AUTO: >50 /HPF

## 2025-07-28 PROCEDURE — 99215 OFFICE O/P EST HI 40 MIN: CPT | Performed by: NURSE PRACTITIONER

## 2025-07-28 PROCEDURE — 85025 COMPLETE CBC W/AUTO DIFF WBC: CPT

## 2025-07-28 PROCEDURE — 2550000001 HC RX 255 CONTRASTS: Performed by: NURSE PRACTITIONER

## 2025-07-28 PROCEDURE — 74177 CT ABD & PELVIS W/CONTRAST: CPT

## 2025-07-28 PROCEDURE — 84100 ASSAY OF PHOSPHORUS: CPT | Performed by: STUDENT IN AN ORGANIZED HEALTH CARE EDUCATION/TRAINING PROGRAM

## 2025-07-28 PROCEDURE — 87086 URINE CULTURE/COLONY COUNT: CPT

## 2025-07-28 PROCEDURE — 81001 URINALYSIS AUTO W/SCOPE: CPT

## 2025-07-28 PROCEDURE — 80048 BASIC METABOLIC PNL TOTAL CA: CPT

## 2025-07-28 PROCEDURE — 36415 COLL VENOUS BLD VENIPUNCTURE: CPT

## 2025-07-28 PROCEDURE — 83735 ASSAY OF MAGNESIUM: CPT

## 2025-07-28 RX ORDER — LEVOFLOXACIN 750 MG/1
750 TABLET, FILM COATED ORAL EVERY 24 HOURS
Qty: 5 TABLET | Refills: 0 | Status: SHIPPED | OUTPATIENT
Start: 2025-07-28 | End: 2025-08-02

## 2025-07-28 RX ADMIN — IOHEXOL 75 ML: 350 INJECTION, SOLUTION INTRAVENOUS at 16:56

## 2025-07-28 ASSESSMENT — PATIENT HEALTH QUESTIONNAIRE - PHQ9
2. FEELING DOWN, DEPRESSED OR HOPELESS: NOT AT ALL
SUM OF ALL RESPONSES TO PHQ9 QUESTIONS 1 AND 2: 0
1. LITTLE INTEREST OR PLEASURE IN DOING THINGS: NOT AT ALL

## 2025-07-28 ASSESSMENT — ENCOUNTER SYMPTOMS
DIFFICULTY URINATING: 0
ACTIVITY CHANGE: 0
NEUROLOGICAL NEGATIVE: 1
HEMATOLOGIC/LYMPHATIC NEGATIVE: 1
UNEXPECTED WEIGHT CHANGE: 0
LOSS OF SENSATION IN FEET: 0
ABDOMINAL PAIN: 0
HEMATURIA: 0
DEPRESSION: 0
DIAPHORESIS: 0
ANAL BLEEDING: 0
RECTAL PAIN: 0
NAUSEA: 0
RESPIRATORY NEGATIVE: 1
DIARRHEA: 0
CHILLS: 0
ABDOMINAL DISTENTION: 0
CONSTIPATION: 0
PSYCHIATRIC NEGATIVE: 1
FREQUENCY: 0
APPETITE CHANGE: 0
FATIGUE: 0
BLOOD IN STOOL: 0
VOMITING: 0
FEVER: 1
OCCASIONAL FEELINGS OF UNSTEADINESS: 1
FLANK PAIN: 1
CARDIOVASCULAR NEGATIVE: 1
DYSURIA: 0

## 2025-07-28 ASSESSMENT — PAIN SCALES - GENERAL: PAINLEVEL_OUTOF10: 4

## 2025-07-28 NOTE — PROGRESS NOTES
Patient ID: [id number]   Madalyn Manzano is a 39 y.o. female with history of anxiety, Hep C, polysubstance abuse, KUNAL-3, with recently diagnosed Stage CORNELIA cervical SCC, cT4 cN1a cM0, with vesicovaginal fistula, obstructive uropathy now s/p bilateral nephrostomy. She reported pelvic pain and intermittent vaginal bleeding/discharge started 1.5 years ago and was treated for PH imbalance by her PCP without improvement. She was initially referred to Dr. Can at [facility] for a biopsy-proved CIN3 but was found to have a huge advanced cervical cancer and a fistula on 12/6/2024. She is seen by Dr. Quiroz at [facility] and confirmed the stage CORNELIA cervical ca. The patient completed radiotherapy as outlined below.     Radiation Treatment Summary:    Treatment site: Cervix  Treatment dates: 5/28/25- 5/30/25  Fractions: 5/5  Dose: 3,000 cGy  Techniqu: HDR     Cancer History:   -11/26/2024 Seen by Dr. Pearce for vaginal discharge and leak of urine. Pap smear showed high risk HPV. Bx showed CIN3. Focal involvement of endocervical epithelium by the high-grade intraepithelial lesion is present.   -12/2/2024 CT CAP showed bilateral prominent hydronephrosis and hydroureter. Possible fistula between vagina and bladder.   -12/6/2024 Seen by Gyn-Onc Dr. Can for CIN3. However, on exam, she was found to have a large cervical tumor  -12/13/2024 s/p bilateral nephrostomy tube placement for hydronephrosis due to obstructive cervical ca.  12/16/2025 and 1/6/2025 seen by Med Onc Dr. Yin. Recommends induction chemo carbotaxol weekly for 6 weeks then weekly cisplatin 6 weeks with EBRT  -12/20/2024 PET CT showed abnormal activity involving endometrial canal extending to the cervical region. PET avid right external iliac lymph node.   -1/8/2025 MRI showed heterogenous enhancement and thickening of the cervix consistent with cervical ca, measuring 4.6x2.3x6.1cm. extending along the anterior wall of the vagina and likely involves the posterior wall  of the urinary bladder. Likely a fistulous tract between the urinary bladder and vagina. Bilateral pelvic sidewall and external iliac lymph nodes.   -1/16/2025 started on induction chemotherapy carbotaxol weekly for 6-week as per [facility] Protocol with Dr. Yin (med onc)   -1/27/2025 Seen by Dr. Quiroz for second opinion. Agreed with the plan.  - Initiated EBRT under Dr. Jeanne Vaca's direction    -4/7/2025 seen by Dr. Rojas and planned for brachytherapy after EBRT.  -04/22/2025 Last cisplatin  -Completed 45 Gy in 25 fractions under Dr. Jeanne Vaca's care on 04/28  -4/28/2025 MRI pelvis showed Interval decreased size of a mass within the anterior right lateral cervix with decreased right parametrial extension; persistent enhancement and diffusion restriction are compatible with residual viable disease. The mass measures 1.8 x 1.4 x 1.5 cm previously measuring 4.6 x 2.3 x 6.1 cm. Also showed near-complete resolution of neoplasm involving the anterior vaginal wall without definite evidence of residual viable disease with no pelvic lymphadenopathy.   -5/05/2025 saw by Dr. Rojas  with plan for brachytherapy on 5/12/2025 which canceled due to pancytopenia noted on PAT on 5/5/2025 (Hb <6, Plt 6). Patient was advised her to go directly to the [facility] for admission, but she declined. According to the patient, she went to an outside hospital ([facility]), where she received 3 units of PRBC and was discharged.     History of presenting illness    Madalyn Manzano is a 39 y.o. female who presents today for follow up 2 months s/p HDR to the cervix. Patient has bilateral neph tubes that were exchanged at OSH on 7/23/25. Per patient they were unable to do twilight so she prefer to return to  main for management moving forward. She endorses increase in left side pain since exchange. She had a fever yesterday. She took some ibuprofen yesterday. Unsure if febrile today. Denies chills. N/V. Nephrostomy tubes  functioning without issues. No blood in bag . Energy fair. Appetite good. Weight stable. Follows with pain management at OSH. Denies vaginal bleeding or discharge. Continues to have urine leakage related to fistula, but it has improved since treatment.  Bowels are baseline. No rectal bleeding. Patient is not currently sexually active. She has not started using dilators yet. Nervous with fistula. Decided to extend medical leave from work. Continues to follow up with med onc near home. She recently had follow up. Next follow up after imaging in September. Not sure if anymore treatment is planned. Imaging and follow up with Dr. Quiroz in September. Based on response on imaging Dr. Quiroz plans on discussing surgical repair of fistula.    Review of systems:  Review of Systems   Constitutional:  Positive for fever. Negative for activity change, appetite change, chills, diaphoresis, fatigue and unexpected weight change.   Respiratory: Negative.     Cardiovascular: Negative.    Gastrointestinal:  Negative for abdominal distention, abdominal pain, anal bleeding, blood in stool, constipation, diarrhea, nausea, rectal pain and vomiting.   Genitourinary:  Positive for enuresis and flank pain (left side vs left flank). Negative for decreased urine volume, difficulty urinating, dysuria, frequency, hematuria, pelvic pain, urgency, vaginal bleeding, vaginal discharge and vaginal pain.   Skin: Negative.    Neurological: Negative.    Hematological: Negative.    Psychiatric/Behavioral: Negative.         Past Medical history  She  has a past surgical history that includes mediport.      Performance status:  KPS/ECO, Able to carry on normal activity; minor signs or symptoms of disease (ECOG equivalent 0)     Last recorded vital:  /68   Pulse 90   Temp 36 °C (96.8 °F) (Temporal)   Resp 18   Wt 65.2 kg (143 lb 11.8 oz)   SpO2 100%   BMI 26.29 kg/m²     Physical exam  Physical Exam  Constitutional:       General:  She is not in acute distress.     Appearance: Normal appearance. She is not ill-appearing, toxic-appearing or diaphoretic.   HENT:      Head: Normocephalic.     Cardiovascular:      Rate and Rhythm: Normal rate and regular rhythm.      Pulses: Normal pulses.      Heart sounds: Normal heart sounds.   Pulmonary:      Effort: Pulmonary effort is normal.      Breath sounds: Normal breath sounds.   Abdominal:      General: There is no distension.      Palpations: There is no mass.      Tenderness: There is no abdominal tenderness. There is no guarding or rebound.   Genitourinary:     Labia:         Right: No rash, tenderness, lesion or injury.         Left: No rash, tenderness, lesion or injury.       Urethra: No prolapse, urethral pain, urethral swelling or urethral lesion.      Comments: Examined with Dr. Perez. Bilateral neph tubes. Dressings intact. No redness. Clear yellow urine in bag, no blood noted. Small bruise on left side adjacent to neph tube. Normal external female genitalia with mild erythema. Skin intact. Some mild urine leakage with speculum insertion. Able to open speculum with lest resistance that last exam. RT changes/inflammation noted at cervix. No masses or bleeding visualized. Tissue/canal softer on palpation than prior to treatment. Able to palpate cervix this exam which is also softer to touch.  Two small openings noted in the mid-anterior vaginal wall, likely site of fistula continue to feel smaller in size.     Musculoskeletal:      Cervical back: Normal range of motion and neck supple.   Lymphadenopathy:      Lower Body: No right inguinal adenopathy. No left inguinal adenopathy.     Neurological:      Mental Status: She is alert.     Labs:      Contains abnormal data CBC and Auto Differential  Order: [id number]   Status: Final result       Dx: Cervical cancer, FIGO stage CORNELIA    Test Result Released: Yes (not seen)    0 Result Notes         Component  Ref Range & Units 14:46  (7/28/25) 2 mo  ago  (5/29/25) 2 mo ago  (5/20/25) 2 mo ago  (5/5/25)   WBC  4.4 - 11.3 x10*3/uL 15.0 High  5.0 4.0 Low  1.4 Low    nRBC  0.0 - 0.0 /100 WBCs 0.0 0.4 High  1.0 High  0.0   RBC  4.00 - 5.20 x10*6/uL 2.95 Low  2.50 Low  2.94 Low  1.55 Low    Hemoglobin  12.0 - 16.0 g/dL 11.9 Low  8.6 Low  9.8 Low  5.8 Low Panic    Hematocrit  36.0 - 46.0 % 33.4 Low  26.2 Low  29.5 Low  17.7 Low    MCV  80 - 100 fL 113 High  105 High  100 114 High    MCH  26.0 - 34.0 pg 40.3 High  34.4 High  33.3 37.4 High    MCHC  32.0 - 36.0 g/dL 35.6 32.8 33.2 32.8   RDW  11.5 - 14.5 % 15.6 High  23.6 High  18.7 High  15.5 High    Platelets  150 - 450 x10*3/uL 106 Low  243 135 Low  5 Low Panic    Neutrophils %  40.0 - 80.0 % 83.8      Immature Granulocytes %, Automated  0.0 - 0.9 % 1.5 High   6.8 High  CM 0.7 CM   Comment: Immature Granulocyte Count (IG) includes promyelocytes, myelocytes and metamyelocytes but does not include bands. Percent differential counts (%) should be interpreted in the context of the absolute cell counts (cells/UL).   Lymphocytes %  13.0 - 44.0 % 4.3      Monocytes %  2.0 - 10.0 % 9.5      Eosinophils %  0.0 - 6.0 % 0.7      Basophils %  0.0 - 2.0 % 0.2      Neutrophils Absolute  1.20 - 7.70 x10*3/uL 12.58 High       Comment: Percent differential counts (%) should be interpreted in the context of the absolute cell counts (cells/uL).   Immature Granulocytes Absolute, Automated  0.00 - 0.70 x10*3/uL 0.22  0.27 0.01   Lymphocytes Absolute  1.20 - 4.80 x10*3/uL 0.64 Low       Monocytes Absolute  0.10 - 1.00 x10*3/uL 1.42 High       Eosinophils Absolute  0.00 - 0.70 x10*3/uL 0.10      Basophils Absolute  0.00 - 0.10 x10*3/uL 0.03      Resulting Agency [facility]              Contains abnormal data Basic Metabolic Panel  Order: [id number]   Status: Final result       Dx: Malignant neoplasm of cervix, unspeci...    Test Result Released: Yes (not seen)    0 Result Notes       1  Topic         Component  Ref Range &  Units 14:46  (7/28/25) 2 mo ago  (5/29/25) 2 mo ago  (5/20/25) 2 mo ago  (5/5/25)   Glucose  74 - 99 mg/dL 90 75 109 High  97   Sodium  136 - 145 mmol/L 134 Low  141 140 139   Potassium  3.5 - 5.3 mmol/L 3.6 4.4 4.5 4.4   Chloride  98 - 107 mmol/L 97 Low  108 High  104 107   Bicarbonate  21 - 32 mmol/L 26 25 27 21   Anion Gap  10 - 20 mmol/L 15 12 14 15   Urea Nitrogen  6 - 23 mg/dL 15 4 Low  4 Low  9   Creatinine  0.50 - 1.05 mg/dL 1.49 High  0.50 0.61 0.65   eGFR  >60 mL/min/1.73m*2 45 Low  >90 CM >90 CM >90 CM   Comment: Calculations of estimated GFR are performed using the 2021 CKD-EPI Study Refit equation without the race variable for the IDMS-Traceable creatinine methods.  https://jasn.asnjournals.org/content/early/2021/09/22/ASN.1448981119   Calcium  8.6 - 10.3 mg/dL 9.4 8.7 R 9.5 9.2 R   Resulting Agency [facility]                 Plan:  Assessment/Plan   39 year old female 2 months s/p HDR to the cervix. Patient is doing well recovering as anticipated. FRANCIS on exam. Inflammation and radiation changes noted. Patient endorsing increased pain on left side since recent neph tube exchange. Dressing dry and intact. No visible signs of infection. Patient had fever x 1 took OTC meds and has not checked since. Denies chills. Will order CBC, UA and CT abdomen and pelvis with contrast.  Patient given dilators and handouts along with instruction at last visit. Would like patient to start using small dilator 3x per week for 5-10 minutes at a time. Continue to follow up with pain management, neph tube management and oncology providers near home. Patient to return to clinic in September for imaging and follow up with [facility] and Dr. Quiroz in September. Dr. Quiroz to discuss surgical repair at that visit pending scan results. CBC came back with WBC of 15.0 script sent for levofloxacin. Creatinine elevated and eGFR decreased, Encouraged hydration. Will recheck labs in a week. Will call with CT results  and UA results and plan.  Patient instructed to call with any questions or concern.      Problem List Items Addressed This Visit       Cervical cancer, FIGO stage CORNELIA - Primary    Relevant Orders    Clinic Appointment Request Follow Up; APARNA PERRY; [facility] S600 [facility] (Completed)    CBC and Auto Differential (Completed)    Urinalysis with Reflex Culture and Microscopic    CT abdomen pelvis w IV contrast     Other Visit Diagnoses         Bacteremia due to other bacteria        Relevant Medications    levoFLOXacin (Levaquin) 750 mg tablet

## 2025-07-29 ENCOUNTER — RESULTS FOLLOW-UP (OUTPATIENT)
Dept: RADIATION ONCOLOGY | Facility: HOSPITAL | Age: 40
End: 2025-07-29
Payer: COMMERCIAL

## 2025-07-29 LAB
BACTERIA UR CULT: NORMAL
HOLD SPECIMEN: NORMAL

## 2025-07-29 NOTE — TELEPHONE ENCOUNTER
Spoke to patient regarding labs and imaging. Given symptoms and results script sent for Levofloxacin 750 mg x 5 days. Encouraged patient to hydrate. Will repeat labs and urine after she completes course of antibiotics. If fever returns or pain worsens patient instructed to go to nearest emergency room. Will fax lab orders to local facility per patient request. Instructed to call with any questions or concerns.

## 2025-07-30 DIAGNOSIS — B96.89 BACTEREMIA DUE TO OTHER BACTERIA: ICD-10-CM

## 2025-07-30 DIAGNOSIS — C53.9 CERVICAL CANCER, FIGO STAGE IVA: Primary | ICD-10-CM

## 2025-07-30 DIAGNOSIS — R78.81 BACTEREMIA DUE TO OTHER BACTERIA: ICD-10-CM

## 2025-09-04 ENCOUNTER — TELEPHONE (OUTPATIENT)
Dept: RADIATION ONCOLOGY | Facility: HOSPITAL | Age: 40
End: 2025-09-04

## 2025-09-05 ENCOUNTER — TELEPHONE (OUTPATIENT)
Dept: ONCOLOGY | Age: 40
End: 2025-09-05

## 2025-09-05 ENCOUNTER — SOCIAL WORK (OUTPATIENT)
Dept: CASE MANAGEMENT | Facility: HOSPITAL | Age: 40
End: 2025-09-05